# Patient Record
Sex: FEMALE | Race: WHITE | NOT HISPANIC OR LATINO | Employment: OTHER | URBAN - METROPOLITAN AREA
[De-identification: names, ages, dates, MRNs, and addresses within clinical notes are randomized per-mention and may not be internally consistent; named-entity substitution may affect disease eponyms.]

---

## 2023-03-01 ENCOUNTER — MEDICAL CORRESPONDENCE (OUTPATIENT)
Dept: HEALTH INFORMATION MANAGEMENT | Facility: CLINIC | Age: 78
End: 2023-03-01

## 2023-07-19 RX ORDER — ATENOLOL 50 MG/1
0.5 TABLET ORAL EVERY EVENING
COMMUNITY
Start: 2023-07-14

## 2023-07-19 RX ORDER — IBUPROFEN 200 MG
1 CAPSULE ORAL 3 TIMES DAILY
COMMUNITY

## 2023-07-19 RX ORDER — ATORVASTATIN CALCIUM 20 MG/1
0.5 TABLET, FILM COATED ORAL DAILY
COMMUNITY
Start: 2023-07-11

## 2023-07-19 RX ORDER — AMITRIPTYLINE HYDROCHLORIDE 50 MG/1
0.5 TABLET ORAL AT BEDTIME
COMMUNITY
Start: 2023-07-14

## 2023-07-19 RX ORDER — LEVOTHYROXINE SODIUM 125 UG/1
1 TABLET ORAL
COMMUNITY

## 2023-07-20 ENCOUNTER — ANESTHESIA EVENT (OUTPATIENT)
Dept: SURGERY | Facility: CLINIC | Age: 78
DRG: 470 | End: 2023-07-20
Payer: COMMERCIAL

## 2023-07-20 RX ORDER — ACETAMINOPHEN 500 MG
1-2 TABLET ORAL EVERY 6 HOURS PRN
Status: ON HOLD | COMMUNITY
End: 2023-07-24

## 2023-07-20 NOTE — PROGRESS NOTES
PTA medications updated by Medication Scribe prior to surgery via phone call with patient (last doses completed by Nurse)     Medication history sources: Patient and H&P  In the past week, patient estimated taking medication this percent of the time: Greater than 90%      Significant changes made to the medication list:  None      Additional medication history information:   None    Medication reconciliation completed by provider prior to medication history? No    Time spent in this activity: 30 minutes    The information provided in this note is only as accurate as the sources available at the time of update(s)    Prior to Admission medications    Medication Sig Last Dose Taking? Auth Provider Long Term End Date   acetaminophen (TYLENOL) 500 MG tablet Take 1-2 tablets by mouth every 6 hours as needed for mild pain 7/19/2023 at prn Yes Reported, Patient     amitriptyline (ELAVIL) 50 MG tablet Take 0.5 tablets by mouth At Bedtime (0.5 x 50 mg = 25 mg) 7/20/2023 at pm Yes Reported, Patient Yes    atenolol (TENORMIN) 50 MG tablet Take 0.5 tablets by mouth every evening (0.5 x 50 mg = 25 mg) 7/20/2023 at pm Yes Reported, Patient Yes    atorvastatin (LIPITOR) 20 MG tablet Take 0.5 tablets by mouth daily (0.5 x 20 mg = 10 mg) 7/20/2023 at pm Yes Reported, Patient Yes    calcium citrate (CITRACAL) 950 (200 Ca) MG tablet Take 1 tablet by mouth 3 times daily 7/14/2023 at pm Yes Reported, Patient     cholecalciferol (VITAMIN D3) 10 mcg (400 units) TABS tablet Take 1 tablet by mouth daily 7/14/2023 at pm Yes Reported, Patient     levothyroxine (SYNTHROID/LEVOTHROID) 125 MCG tablet Take 1 tablet by mouth every morning (before breakfast)  at am Yes Reported, Patient Yes    omeprazole (PRILOSEC) 20 MG DR capsule Take 1 capsule by mouth daily 7/20/2023 at pm Yes Reported, Patient       Medication history completed by:    Leonel Gonzalez CPhT  Medication Scribe  [facility]

## 2023-07-21 ENCOUNTER — HOSPITAL ENCOUNTER (INPATIENT)
Facility: CLINIC | Age: 78
LOS: 3 days | Discharge: NURSING, REHAB OR LONG-TERM CARE | DRG: 470 | End: 2023-07-25
Attending: ORTHOPAEDIC SURGERY | Admitting: ORTHOPAEDIC SURGERY
Payer: COMMERCIAL

## 2023-07-21 ENCOUNTER — APPOINTMENT (OUTPATIENT)
Dept: PHYSICAL THERAPY | Facility: CLINIC | Age: 78
DRG: 470 | End: 2023-07-21
Attending: ORTHOPAEDIC SURGERY
Payer: COMMERCIAL

## 2023-07-21 ENCOUNTER — APPOINTMENT (OUTPATIENT)
Dept: GENERAL RADIOLOGY | Facility: CLINIC | Age: 78
DRG: 470 | End: 2023-07-21
Attending: ORTHOPAEDIC SURGERY
Payer: COMMERCIAL

## 2023-07-21 ENCOUNTER — ANESTHESIA (OUTPATIENT)
Dept: SURGERY | Facility: CLINIC | Age: 78
DRG: 470 | End: 2023-07-21
Payer: COMMERCIAL

## 2023-07-21 DIAGNOSIS — Z96.652 STATUS POST TOTAL LEFT KNEE REPLACEMENT: Primary | ICD-10-CM

## 2023-07-21 LAB
CREAT SERPL-MCNC: 0.67 MG/DL (ref 0.51–0.95)
GFR SERPL CREATININE-BSD FRML MDRD: 88 ML/MIN/1.73M2

## 2023-07-21 PROCEDURE — 999N000141 HC STATISTIC PRE-PROCEDURE NURSING ASSESSMENT: Performed by: ORTHOPAEDIC SURGERY

## 2023-07-21 PROCEDURE — 250N000011 HC RX IP 250 OP 636: Performed by: NURSE ANESTHETIST, CERTIFIED REGISTERED

## 2023-07-21 PROCEDURE — 97162 PT EVAL MOD COMPLEX 30 MIN: CPT | Mod: GP

## 2023-07-21 PROCEDURE — 97116 GAIT TRAINING THERAPY: CPT | Mod: GP

## 2023-07-21 PROCEDURE — 258N000003 HC RX IP 258 OP 636: Performed by: NURSE ANESTHETIST, CERTIFIED REGISTERED

## 2023-07-21 PROCEDURE — 360N000077 HC SURGERY LEVEL 4, PER MIN: Performed by: ORTHOPAEDIC SURGERY

## 2023-07-21 PROCEDURE — 258N000003 HC RX IP 258 OP 636: Performed by: ANESTHESIOLOGY

## 2023-07-21 PROCEDURE — C1713 ANCHOR/SCREW BN/BN,TIS/BN: HCPCS | Performed by: ORTHOPAEDIC SURGERY

## 2023-07-21 PROCEDURE — 999N000063 XR KNEE PORT LEFT 1/2 VIEWS: Mod: LT

## 2023-07-21 PROCEDURE — 250N000009 HC RX 250: Performed by: STUDENT IN AN ORGANIZED HEALTH CARE EDUCATION/TRAINING PROGRAM

## 2023-07-21 PROCEDURE — 250N000011 HC RX IP 250 OP 636: Performed by: STUDENT IN AN ORGANIZED HEALTH CARE EDUCATION/TRAINING PROGRAM

## 2023-07-21 PROCEDURE — 97530 THERAPEUTIC ACTIVITIES: CPT | Mod: GP

## 2023-07-21 PROCEDURE — 272N000001 HC OR GENERAL SUPPLY STERILE: Performed by: ORTHOPAEDIC SURGERY

## 2023-07-21 PROCEDURE — 250N000011 HC RX IP 250 OP 636: Performed by: ORTHOPAEDIC SURGERY

## 2023-07-21 PROCEDURE — 250N000013 HC RX MED GY IP 250 OP 250 PS 637: Performed by: ORTHOPAEDIC SURGERY

## 2023-07-21 PROCEDURE — 36415 COLL VENOUS BLD VENIPUNCTURE: CPT | Performed by: STUDENT IN AN ORGANIZED HEALTH CARE EDUCATION/TRAINING PROGRAM

## 2023-07-21 PROCEDURE — 999N000127 HC STATISTIC PERIPHERAL IV START W US GUIDANCE

## 2023-07-21 PROCEDURE — 82565 ASSAY OF CREATININE: CPT | Performed by: STUDENT IN AN ORGANIZED HEALTH CARE EDUCATION/TRAINING PROGRAM

## 2023-07-21 PROCEDURE — 250N000013 HC RX MED GY IP 250 OP 250 PS 637: Performed by: STUDENT IN AN ORGANIZED HEALTH CARE EDUCATION/TRAINING PROGRAM

## 2023-07-21 PROCEDURE — 710N000009 HC RECOVERY PHASE 1, LEVEL 1, PER MIN: Performed by: ORTHOPAEDIC SURGERY

## 2023-07-21 PROCEDURE — 250N000013 HC RX MED GY IP 250 OP 250 PS 637: Performed by: PHYSICIAN ASSISTANT

## 2023-07-21 PROCEDURE — 258N000003 HC RX IP 258 OP 636: Performed by: ORTHOPAEDIC SURGERY

## 2023-07-21 PROCEDURE — 258N000003 HC RX IP 258 OP 636: Performed by: STUDENT IN AN ORGANIZED HEALTH CARE EDUCATION/TRAINING PROGRAM

## 2023-07-21 PROCEDURE — 370N000017 HC ANESTHESIA TECHNICAL FEE, PER MIN: Performed by: ORTHOPAEDIC SURGERY

## 2023-07-21 PROCEDURE — C1776 JOINT DEVICE (IMPLANTABLE): HCPCS | Performed by: ORTHOPAEDIC SURGERY

## 2023-07-21 PROCEDURE — 250N000011 HC RX IP 250 OP 636: Performed by: ANESTHESIOLOGY

## 2023-07-21 PROCEDURE — 0SRD0J9 REPLACEMENT OF LEFT KNEE JOINT WITH SYNTHETIC SUBSTITUTE, CEMENTED, OPEN APPROACH: ICD-10-PCS | Performed by: ORTHOPAEDIC SURGERY

## 2023-07-21 PROCEDURE — 250N000009 HC RX 250: Performed by: ANESTHESIOLOGY

## 2023-07-21 PROCEDURE — 99207 PR NO BILLABLE SERVICE THIS VISIT: CPT | Performed by: PHYSICIAN ASSISTANT

## 2023-07-21 DEVICE — ATTUNE PATELLA MEDIALIZED DOME 35MM CEMENTED AOX
Type: IMPLANTABLE DEVICE | Site: KNEE | Status: FUNCTIONAL
Brand: ATTUNE

## 2023-07-21 DEVICE — BONE CEMENT RADIOPAQUE SIMPLEX HV FULL DOSE 6194-1-001: Type: IMPLANTABLE DEVICE | Site: KNEE | Status: FUNCTIONAL

## 2023-07-21 DEVICE — ATTUNE KNEE SYSTEM TIBIAL INSERT FIXED BEARING MEDIAL STABILIZED LEFT AOX 5, 8MM
Type: IMPLANTABLE DEVICE | Site: KNEE | Status: FUNCTIONAL
Brand: ATTUNE

## 2023-07-21 DEVICE — ATTUNE KNEE SYSTEM FEMORAL CRUCIATE RETAINING NARROW SIZE 5N LEFT CEMENTED
Type: IMPLANTABLE DEVICE | Site: KNEE | Status: FUNCTIONAL
Brand: ATTUNE

## 2023-07-21 DEVICE — ATTUNE KNEE SYSTEM TIBIAL BASE FIXED BEARING SIZE 3 CEMENTED
Type: IMPLANTABLE DEVICE | Site: KNEE | Status: FUNCTIONAL
Brand: ATTUNE

## 2023-07-21 RX ORDER — PROPOFOL 10 MG/ML
INJECTION, EMULSION INTRAVENOUS PRN
Status: DISCONTINUED | OUTPATIENT
Start: 2023-07-21 | End: 2023-07-21

## 2023-07-21 RX ORDER — ONDANSETRON 2 MG/ML
4 INJECTION INTRAMUSCULAR; INTRAVENOUS EVERY 30 MIN PRN
Status: DISCONTINUED | OUTPATIENT
Start: 2023-07-21 | End: 2023-07-21 | Stop reason: HOSPADM

## 2023-07-21 RX ORDER — DIPHENHYDRAMINE HCL 12.5MG/5ML
12.5 LIQUID (ML) ORAL EVERY 6 HOURS PRN
Status: DISCONTINUED | OUTPATIENT
Start: 2023-07-21 | End: 2023-07-25 | Stop reason: HOSPADM

## 2023-07-21 RX ORDER — CELECOXIB 200 MG/1
200 CAPSULE ORAL DAILY
Qty: 42 CAPSULE | Refills: 0 | Status: SHIPPED | OUTPATIENT
Start: 2023-07-21 | End: 2023-07-24

## 2023-07-21 RX ORDER — HYDROMORPHONE HCL IN WATER/PF 6 MG/30 ML
0.4 PATIENT CONTROLLED ANALGESIA SYRINGE INTRAVENOUS EVERY 5 MIN PRN
Status: DISCONTINUED | OUTPATIENT
Start: 2023-07-21 | End: 2023-07-21 | Stop reason: HOSPADM

## 2023-07-21 RX ORDER — HYDROXYZINE HYDROCHLORIDE 10 MG/1
10 TABLET, FILM COATED ORAL EVERY 6 HOURS PRN
Status: DISCONTINUED | OUTPATIENT
Start: 2023-07-21 | End: 2023-07-25 | Stop reason: HOSPADM

## 2023-07-21 RX ORDER — OXYCODONE HYDROCHLORIDE 5 MG/1
5 TABLET ORAL EVERY 4 HOURS PRN
Status: DISCONTINUED | OUTPATIENT
Start: 2023-07-21 | End: 2023-07-23

## 2023-07-21 RX ORDER — SODIUM CHLORIDE, SODIUM LACTATE, POTASSIUM CHLORIDE, CALCIUM CHLORIDE 600; 310; 30; 20 MG/100ML; MG/100ML; MG/100ML; MG/100ML
INJECTION, SOLUTION INTRAVENOUS CONTINUOUS
Status: DISCONTINUED | OUTPATIENT
Start: 2023-07-21 | End: 2023-07-21 | Stop reason: HOSPADM

## 2023-07-21 RX ORDER — AMOXICILLIN 250 MG
1-2 CAPSULE ORAL 2 TIMES DAILY
Qty: 30 TABLET | Refills: 0 | Status: SHIPPED | OUTPATIENT
Start: 2023-07-21

## 2023-07-21 RX ORDER — PREGABALIN 150 MG/1
150 CAPSULE ORAL ONCE
Status: COMPLETED | OUTPATIENT
Start: 2023-07-21 | End: 2023-07-21

## 2023-07-21 RX ORDER — NALOXONE HYDROCHLORIDE 0.4 MG/ML
0.2 INJECTION, SOLUTION INTRAMUSCULAR; INTRAVENOUS; SUBCUTANEOUS
Status: DISCONTINUED | OUTPATIENT
Start: 2023-07-21 | End: 2023-07-25 | Stop reason: HOSPADM

## 2023-07-21 RX ORDER — BUPIVACAINE HYDROCHLORIDE 7.5 MG/ML
INJECTION, SOLUTION INTRASPINAL
Status: COMPLETED | OUTPATIENT
Start: 2023-07-21 | End: 2023-07-21

## 2023-07-21 RX ORDER — ONDANSETRON 4 MG/1
4 TABLET, ORALLY DISINTEGRATING ORAL EVERY 30 MIN PRN
Status: DISCONTINUED | OUTPATIENT
Start: 2023-07-21 | End: 2023-07-21 | Stop reason: HOSPADM

## 2023-07-21 RX ORDER — HYDROMORPHONE HCL IN WATER/PF 6 MG/30 ML
0.2 PATIENT CONTROLLED ANALGESIA SYRINGE INTRAVENOUS
Status: DISCONTINUED | OUTPATIENT
Start: 2023-07-21 | End: 2023-07-25 | Stop reason: HOSPADM

## 2023-07-21 RX ORDER — ATORVASTATIN CALCIUM 10 MG/1
10 TABLET, FILM COATED ORAL EVERY EVENING
Status: DISCONTINUED | OUTPATIENT
Start: 2023-07-21 | End: 2023-07-25 | Stop reason: HOSPADM

## 2023-07-21 RX ORDER — ACETAMINOPHEN 325 MG/1
975 TABLET ORAL ONCE
Status: COMPLETED | OUTPATIENT
Start: 2023-07-21 | End: 2023-07-21

## 2023-07-21 RX ORDER — METHOCARBAMOL 500 MG/1
500 TABLET, FILM COATED ORAL EVERY 6 HOURS PRN
Status: DISCONTINUED | OUTPATIENT
Start: 2023-07-21 | End: 2023-07-25 | Stop reason: HOSPADM

## 2023-07-21 RX ORDER — ACETAMINOPHEN 325 MG/1
975 TABLET ORAL EVERY 6 HOURS PRN
Qty: 100 TABLET | Refills: 0 | Status: SHIPPED | OUTPATIENT
Start: 2023-07-21

## 2023-07-21 RX ORDER — PROPOFOL 10 MG/ML
INJECTION, EMULSION INTRAVENOUS CONTINUOUS PRN
Status: DISCONTINUED | OUTPATIENT
Start: 2023-07-21 | End: 2023-07-21

## 2023-07-21 RX ORDER — NALOXONE HYDROCHLORIDE 0.4 MG/ML
0.4 INJECTION, SOLUTION INTRAMUSCULAR; INTRAVENOUS; SUBCUTANEOUS
Status: DISCONTINUED | OUTPATIENT
Start: 2023-07-21 | End: 2023-07-25 | Stop reason: HOSPADM

## 2023-07-21 RX ORDER — SODIUM CHLORIDE, SODIUM LACTATE, POTASSIUM CHLORIDE, CALCIUM CHLORIDE 600; 310; 30; 20 MG/100ML; MG/100ML; MG/100ML; MG/100ML
INJECTION, SOLUTION INTRAVENOUS CONTINUOUS PRN
Status: DISCONTINUED | OUTPATIENT
Start: 2023-07-21 | End: 2023-07-21

## 2023-07-21 RX ORDER — KETOROLAC TROMETHAMINE 15 MG/ML
15 INJECTION, SOLUTION INTRAMUSCULAR; INTRAVENOUS EVERY 6 HOURS
Status: COMPLETED | OUTPATIENT
Start: 2023-07-21 | End: 2023-07-22

## 2023-07-21 RX ORDER — VANCOMYCIN HYDROCHLORIDE 1 G/20ML
INJECTION, POWDER, LYOPHILIZED, FOR SOLUTION INTRAVENOUS PRN
Status: DISCONTINUED | OUTPATIENT
Start: 2023-07-21 | End: 2023-07-21 | Stop reason: HOSPADM

## 2023-07-21 RX ORDER — ACETAMINOPHEN 325 MG/1
975 TABLET ORAL EVERY 8 HOURS
Status: COMPLETED | OUTPATIENT
Start: 2023-07-21 | End: 2023-07-24

## 2023-07-21 RX ORDER — CELECOXIB 200 MG/1
400 CAPSULE ORAL ONCE
Status: COMPLETED | OUTPATIENT
Start: 2023-07-21 | End: 2023-07-21

## 2023-07-21 RX ORDER — BISACODYL 10 MG
10 SUPPOSITORY, RECTAL RECTAL DAILY PRN
Status: DISCONTINUED | OUTPATIENT
Start: 2023-07-21 | End: 2023-07-25 | Stop reason: HOSPADM

## 2023-07-21 RX ORDER — CEFAZOLIN SODIUM/WATER 2 G/20 ML
2 SYRINGE (ML) INTRAVENOUS
Status: COMPLETED | OUTPATIENT
Start: 2023-07-21 | End: 2023-07-21

## 2023-07-21 RX ORDER — CEFAZOLIN SODIUM/WATER 2 G/20 ML
2 SYRINGE (ML) INTRAVENOUS SEE ADMIN INSTRUCTIONS
Status: DISCONTINUED | OUTPATIENT
Start: 2023-07-21 | End: 2023-07-21 | Stop reason: HOSPADM

## 2023-07-21 RX ORDER — LIDOCAINE 40 MG/G
CREAM TOPICAL
Status: DISCONTINUED | OUTPATIENT
Start: 2023-07-21 | End: 2023-07-25 | Stop reason: HOSPADM

## 2023-07-21 RX ORDER — IBUPROFEN 200 MG
950 CAPSULE ORAL 3 TIMES DAILY
Status: DISCONTINUED | OUTPATIENT
Start: 2023-07-21 | End: 2023-07-25 | Stop reason: HOSPADM

## 2023-07-21 RX ORDER — KETOROLAC TROMETHAMINE 10 MG/1
10 TABLET, FILM COATED ORAL EVERY 6 HOURS
Qty: 6 TABLET | Refills: 0 | Status: SHIPPED | OUTPATIENT
Start: 2023-07-21 | End: 2023-07-24

## 2023-07-21 RX ORDER — ONDANSETRON 4 MG/1
4 TABLET, ORALLY DISINTEGRATING ORAL EVERY 6 HOURS PRN
Status: DISCONTINUED | OUTPATIENT
Start: 2023-07-21 | End: 2023-07-25 | Stop reason: HOSPADM

## 2023-07-21 RX ORDER — HYDROMORPHONE HCL IN WATER/PF 6 MG/30 ML
0.2 PATIENT CONTROLLED ANALGESIA SYRINGE INTRAVENOUS EVERY 5 MIN PRN
Status: DISCONTINUED | OUTPATIENT
Start: 2023-07-21 | End: 2023-07-21 | Stop reason: HOSPADM

## 2023-07-21 RX ORDER — CALCIUM CARBONATE 500 MG/1
500 TABLET, CHEWABLE ORAL 4 TIMES DAILY PRN
Status: DISCONTINUED | OUTPATIENT
Start: 2023-07-21 | End: 2023-07-25 | Stop reason: HOSPADM

## 2023-07-21 RX ORDER — CEFAZOLIN SODIUM 2 G/100ML
2 INJECTION, SOLUTION INTRAVENOUS EVERY 8 HOURS
Status: COMPLETED | OUTPATIENT
Start: 2023-07-21 | End: 2023-07-21

## 2023-07-21 RX ORDER — ASPIRIN 325 MG
325 TABLET, DELAYED RELEASE (ENTERIC COATED) ORAL DAILY
Qty: 42 TABLET | Refills: 0 | Status: SHIPPED | OUTPATIENT
Start: 2023-07-21 | End: 2023-09-01

## 2023-07-21 RX ORDER — ONDANSETRON 2 MG/ML
4 INJECTION INTRAMUSCULAR; INTRAVENOUS EVERY 6 HOURS PRN
Status: DISCONTINUED | OUTPATIENT
Start: 2023-07-21 | End: 2023-07-25 | Stop reason: HOSPADM

## 2023-07-21 RX ORDER — HYDROMORPHONE HCL IN WATER/PF 6 MG/30 ML
0.4 PATIENT CONTROLLED ANALGESIA SYRINGE INTRAVENOUS
Status: DISCONTINUED | OUTPATIENT
Start: 2023-07-21 | End: 2023-07-25 | Stop reason: HOSPADM

## 2023-07-21 RX ORDER — FENTANYL CITRATE 0.05 MG/ML
50 INJECTION, SOLUTION INTRAMUSCULAR; INTRAVENOUS EVERY 5 MIN PRN
Status: DISCONTINUED | OUTPATIENT
Start: 2023-07-21 | End: 2023-07-21 | Stop reason: HOSPADM

## 2023-07-21 RX ORDER — PROCHLORPERAZINE MALEATE 5 MG
5 TABLET ORAL EVERY 6 HOURS PRN
Status: DISCONTINUED | OUTPATIENT
Start: 2023-07-21 | End: 2023-07-25 | Stop reason: HOSPADM

## 2023-07-21 RX ORDER — ATENOLOL 25 MG/1
25 TABLET ORAL EVERY EVENING
Status: DISCONTINUED | OUTPATIENT
Start: 2023-07-21 | End: 2023-07-25 | Stop reason: HOSPADM

## 2023-07-21 RX ORDER — FENTANYL CITRATE 0.05 MG/ML
25 INJECTION, SOLUTION INTRAMUSCULAR; INTRAVENOUS EVERY 5 MIN PRN
Status: DISCONTINUED | OUTPATIENT
Start: 2023-07-21 | End: 2023-07-21 | Stop reason: HOSPADM

## 2023-07-21 RX ORDER — AMOXICILLIN 250 MG
1 CAPSULE ORAL 2 TIMES DAILY
Status: DISCONTINUED | OUTPATIENT
Start: 2023-07-21 | End: 2023-07-25 | Stop reason: HOSPADM

## 2023-07-21 RX ORDER — OXYCODONE HYDROCHLORIDE 5 MG/1
10 TABLET ORAL EVERY 4 HOURS PRN
Status: DISCONTINUED | OUTPATIENT
Start: 2023-07-21 | End: 2023-07-23

## 2023-07-21 RX ORDER — ACETAMINOPHEN 325 MG/1
650 TABLET ORAL EVERY 4 HOURS PRN
Status: DISCONTINUED | OUTPATIENT
Start: 2023-07-24 | End: 2023-07-25 | Stop reason: HOSPADM

## 2023-07-21 RX ORDER — ASPIRIN 325 MG
325 TABLET, DELAYED RELEASE (ENTERIC COATED) ORAL DAILY
Status: DISCONTINUED | OUTPATIENT
Start: 2023-07-22 | End: 2023-07-25 | Stop reason: HOSPADM

## 2023-07-21 RX ORDER — SODIUM CHLORIDE, SODIUM LACTATE, POTASSIUM CHLORIDE, CALCIUM CHLORIDE 600; 310; 30; 20 MG/100ML; MG/100ML; MG/100ML; MG/100ML
INJECTION, SOLUTION INTRAVENOUS CONTINUOUS
Status: DISCONTINUED | OUTPATIENT
Start: 2023-07-21 | End: 2023-07-23 | Stop reason: ALTCHOICE

## 2023-07-21 RX ORDER — TRANEXAMIC ACID 650 MG/1
1950 TABLET ORAL ONCE
Status: COMPLETED | OUTPATIENT
Start: 2023-07-21 | End: 2023-07-21

## 2023-07-21 RX ORDER — POLYETHYLENE GLYCOL 3350 17 G/17G
17 POWDER, FOR SOLUTION ORAL DAILY
Status: DISCONTINUED | OUTPATIENT
Start: 2023-07-22 | End: 2023-07-25 | Stop reason: HOSPADM

## 2023-07-21 RX ADMIN — SENNOSIDES AND DOCUSATE SODIUM 1 TABLET: 8.6; 5 TABLET ORAL at 20:48

## 2023-07-21 RX ADMIN — BUPIVACAINE HYDROCHLORIDE 15 ML: 5 INJECTION, SOLUTION EPIDURAL; INTRACAUDAL at 06:58

## 2023-07-21 RX ADMIN — CHOLECALCIFEROL TAB 10 MCG (400 UNIT) 10 MCG: 10 TAB at 20:48

## 2023-07-21 RX ADMIN — ACETAMINOPHEN 975 MG: 325 TABLET, FILM COATED ORAL at 22:31

## 2023-07-21 RX ADMIN — PROPOFOL 125 MCG/KG/MIN: 10 INJECTION, EMULSION INTRAVENOUS at 07:48

## 2023-07-21 RX ADMIN — SODIUM CHLORIDE, POTASSIUM CHLORIDE, SODIUM LACTATE AND CALCIUM CHLORIDE: 600; 310; 30; 20 INJECTION, SOLUTION INTRAVENOUS at 11:35

## 2023-07-21 RX ADMIN — CEFAZOLIN SODIUM 2 G: 2 INJECTION, SOLUTION INTRAVENOUS at 14:20

## 2023-07-21 RX ADMIN — PROPOFOL 30 MG: 10 INJECTION, EMULSION INTRAVENOUS at 07:49

## 2023-07-21 RX ADMIN — KETOROLAC TROMETHAMINE 15 MG: 15 INJECTION, SOLUTION INTRAMUSCULAR; INTRAVENOUS at 18:51

## 2023-07-21 RX ADMIN — CEFAZOLIN SODIUM 2 G: 2 INJECTION, SOLUTION INTRAVENOUS at 22:32

## 2023-07-21 RX ADMIN — TRANEXAMIC ACID 1950 MG: 650 TABLET ORAL at 06:29

## 2023-07-21 RX ADMIN — SODIUM CHLORIDE, POTASSIUM CHLORIDE, SODIUM LACTATE AND CALCIUM CHLORIDE: 600; 310; 30; 20 INJECTION, SOLUTION INTRAVENOUS at 10:02

## 2023-07-21 RX ADMIN — Medication 950 MG: at 22:31

## 2023-07-21 RX ADMIN — Medication 2 G: at 07:34

## 2023-07-21 RX ADMIN — SODIUM CHLORIDE, POTASSIUM CHLORIDE, SODIUM LACTATE AND CALCIUM CHLORIDE: 600; 310; 30; 20 INJECTION, SOLUTION INTRAVENOUS at 07:22

## 2023-07-21 RX ADMIN — SENNOSIDES AND DOCUSATE SODIUM 1 TABLET: 8.6; 5 TABLET ORAL at 12:21

## 2023-07-21 RX ADMIN — AMITRIPTYLINE HYDROCHLORIDE 25 MG: 25 TABLET, FILM COATED ORAL at 22:31

## 2023-07-21 RX ADMIN — ACETAMINOPHEN 975 MG: 325 TABLET, FILM COATED ORAL at 13:29

## 2023-07-21 RX ADMIN — MIDAZOLAM 2 MG: 1 INJECTION INTRAMUSCULAR; INTRAVENOUS at 06:51

## 2023-07-21 RX ADMIN — CELECOXIB 400 MG: 200 CAPSULE ORAL at 06:31

## 2023-07-21 RX ADMIN — ATORVASTATIN CALCIUM 10 MG: 10 TABLET, FILM COATED ORAL at 20:48

## 2023-07-21 RX ADMIN — ACETAMINOPHEN 975 MG: 325 TABLET, FILM COATED ORAL at 06:29

## 2023-07-21 RX ADMIN — BUPIVACAINE HYDROCHLORIDE IN DEXTROSE 1.6 ML: 7.5 INJECTION, SOLUTION SUBARACHNOID at 07:48

## 2023-07-21 RX ADMIN — Medication 950 MG: at 15:04

## 2023-07-21 RX ADMIN — PROPOFOL 30 MG: 10 INJECTION, EMULSION INTRAVENOUS at 07:53

## 2023-07-21 RX ADMIN — PHENYLEPHRINE HYDROCHLORIDE 0.5 MCG/KG/MIN: 10 INJECTION INTRAVENOUS at 07:48

## 2023-07-21 RX ADMIN — PREGABALIN 150 MG: 150 CAPSULE ORAL at 06:30

## 2023-07-21 RX ADMIN — SODIUM CHLORIDE, POTASSIUM CHLORIDE, SODIUM LACTATE AND CALCIUM CHLORIDE: 600; 310; 30; 20 INJECTION, SOLUTION INTRAVENOUS at 06:31

## 2023-07-21 ASSESSMENT — ACTIVITIES OF DAILY LIVING (ADL)
ADLS_ACUITY_SCORE: 18
ADLS_ACUITY_SCORE: 22
ADLS_ACUITY_SCORE: 18
ADLS_ACUITY_SCORE: 22
ADLS_ACUITY_SCORE: 35
ADLS_ACUITY_SCORE: 18
ADLS_ACUITY_SCORE: 22

## 2023-07-21 ASSESSMENT — ENCOUNTER SYMPTOMS
DYSRHYTHMIAS: 0
ORTHOPNEA: 0
SEIZURES: 0

## 2023-07-21 NOTE — PROGRESS NOTES
07/21/23 1533   Appointment Info   Signing Clinician's Name / Credentials (PT) Bebeto Alford DPT   Quick Adds   Quick Adds Certification   Living Environment   People in Home spouse   Current Living Arrangements house   Home Accessibility stairs to enter home;stairs within home   Number of Stairs, Main Entrance 1   Stair Railings, Main Entrance railing on left side (ascending)   Number of Stairs, Within Home, Primary greater than 10 stairs   Stair Railings, Within Home, Primary railing on left side (ascending)   Transportation Anticipated car, drives self   Living Environment Comments Pt reports that she lives w/ spouse in a house. Reports one step to enter w/ L sided railing. Reports a flight of stairs to her bedroom w/ L sided railing.   Self-Care   Usual Activity Tolerance good   Current Activity Tolerance moderate   Equipment Currently Used at Home walker, rolling   Fall history within last six months no   Activity/Exercise/Self-Care Comment Reports typically independent at baseline w/ all mobility and ADLs w/o AD.   General Information   Onset of Illness/Injury or Date of Surgery 07/21/23   Referring Physician Coy Fowler MD   Patient/Family Therapy Goals Statement (PT) Return to home   Pertinent History of Current Problem (include personal factors and/or comorbidities that impact the POC) POD #0 L TKA   Existing Precautions/Restrictions fall   Weight-Bearing Status - LLE weight-bearing as tolerated   Cognition   Affect/Mental Status (Cognition) WFL   Orientation Status (Cognition) oriented x 4   Follows Commands (Cognition) WFL   Pain Assessment   Patient Currently in Pain Yes, see Vital Sign flowsheet  (4/10 at rest)   Range of Motion (ROM)   ROM Comment 22-80 L knee AROM   Strength (Manual Muscle Testing)   Strength (Manual Muscle Testing) Able to perform L SLR   Bed Mobility   Comment, (Bed Mobility) Supine to sit w/ SBA slow sher   Transfers   Comment, (Transfers) SIt to stand w/ FWW and CGA    Gait/Stairs (Locomotion)   Comment, (Gait/Stairs) 10 ft w/ FWW and CGA   Balance   Balance Comments No overt LOB noted   Clinical Impression   Criteria for Skilled Therapeutic Intervention Yes, treatment indicated   PT Diagnosis (PT) Impaired ambulation   Influenced by the following impairments Impaired strength, balance and activity tolerance   Functional limitations due to impairments Impaired ADLs, IADLs and functional mobility   Clinical Presentation (PT Evaluation Complexity) Stable/Uncomplicated   Clinical Presentation Rationale Clinical judgment   Clinical Decision Making (Complexity) low complexity   Planned Therapy Interventions (PT) balance training;bed mobility training;gait training;home exercise program;patient/family education;transfer training;stair training;strengthening;ROM (range of motion);progressive activity/exercise   Risk & Benefits of therapy have been explained evaluation/treatment results reviewed;care plan/treatment goals reviewed;risks/benefits reviewed;current/potential barriers reviewed;participants voiced agreement with care plan;participants included;patient   PT Total Evaluation Time   PT Eval, Moderate Complexity Minutes (15686) 10   Therapy Certification   Start of care date 07/21/23   Certification date from 07/21/23   Certification date to 07/24/23   Medical Diagnosis L TKA   Physical Therapy Goals   PT Frequency 2x/day   PT Predicted Duration/Target Date for Goal Attainment 07/26/23   PT Goals Bed Mobility;Transfers;Gait;Stairs   PT: Bed Mobility Independent;Supine to/from sit   PT: Transfers Modified independent;Sit to/from stand;Assistive device   PT: Gait Modified independent;Rolling walker;150 feet   PT: Stairs Independent;Greater than 10 stairs;Rail on left   Interventions   Interventions Quick Adds Gait Training;Therapeutic Activity;Therapeutic Procedure   Therapeutic Procedure/Exercise   Ther. Procedure: strength, endurance, ROM, flexibillity Minutes (95323) 8    Symptoms Noted During/After Treatment increased pain   Treatment Detail/Skilled Intervention Pt completing supine exercises to improve upon functional strengthening. Completing x5 reps of L APs, quad sets, glute sets, SAQ, heel slides and SLR. Educated on sets and reps. Handout provided.   Therapeutic Activity   Therapeutic Activities: dynamic activities to improve functional performance Minutes (72718) 12   Symptoms Noted During/After Treatment Increased pain;Fatigue   Treatment Detail/Skilled Intervention Pt supine in bed at start of session. Agreeable to PT. Educated on WBAT status. Slow sher for supine to sit transfer requiring cues for sequencing. Sit to stand from bed height x1 during session w/ FWW and CGA. Requesting to use toilet during session. Stand <> sit from toilet height w/ FWW and CGA. Educated on kicking LLE out w/ transfers to decrease pain. Able to wash hands at sink w/ CGA. Transfering to bedside chair w/ FWW and CGA. Slow and painful sher noted.   Gait Training   Gait Training Minutes (69522) 10   Symptoms Noted During/After Treatment (Gait Training) increased pain;fatigue   Treatment Detail/Skilled Intervention Pt ambulating ~100 ft w/ FWW and CGA. Slow sher noted. Step to pattern initially and then reciprocal pattern. Reporting pain w/ ambulation. Downward head. Educated on sizing FWW for at home.   PT Discharge Planning   PT Plan Stairs, Ambulation distance, review HEP, transfers and bed mobility   PT Discharge Recommendation (DC Rec)   (Defer to ortho)   PT Rationale for DC Rec Pt below baseline mobility. Lives in a house with spouse. Has stairs to complete. Typically independent at baseline w/ mobility and ADLs w/o AD. Currently CGA w/ FWW. Anticipate with continued IP PT Pt will be able to progress to Mod I w/ FWW and be able to DC home w/  for assistance and begin OP PT as planned.   PT Brief overview of current status CGA w/ FWW   Total Session Time   Timed Code  Treatment Minutes 30   Total Session Time (sum of timed and untimed services) 40   [facility]  OUTPATIENT PHYSICAL THERAPY EVALUATION  PLAN OF TREATMENT FOR OUTPATIENT REHABILITATION  (COMPLETE FOR INITIAL CLAIMS ONLY)  Patient's Last Name, First Name, M.I.  YOB: 1945  Liliane Romo                        Provider's Name  [facility] Medical Record No.  [id number]                             Onset Date:  07/21/23   Start of Care Date:  07/21/23   Type:     _X_PT   ___OT   ___SLP Medical Diagnosis:  L TKA              PT Diagnosis:  Impaired ambulation Visits from SOC:  1     See note for plan of treatment, functional goals and certification details    I CERTIFY THE NEED FOR THESE SERVICES FURNISHED UNDER        THIS PLAN OF TREATMENT AND WHILE UNDER MY CARE     (Physician co-signature of this document indicates review and certification of the therapy plan).

## 2023-07-21 NOTE — ANESTHESIA PROCEDURE NOTES
"Intrathecal Procedure Note    Pre-Procedure   Staff -        Anesthesiologist:  Mario Carrillo MD       Performed By: anesthesiologist       Location: OR       Pre-Anesthestic Checklist: patient identified, IV checked, site marked, risks and benefits discussed, informed consent, monitors and equipment checked and pre-op evaluation  Timeout:       Correct Patient: Yes        Correct Procedure: Yes        Correct Site: Yes        Correct Position: Yes   Procedure Documentation  Procedure: intrathecal       Patient Position: sitting       Patient Prep/Sterile Barriers: sterile gloves, mask, patient draped       Skin prep: Betadine       Insertion Site: L4-5. (right paramedian approach).       Needle Gauge: 22.        Needle Length (Inches): 4        Spinal Needle Type: Quincke       Introducer used       Introducer: 20 G       # of attempts: 2 and  # of redirects:  3    Assessment/Narrative         Paresthesias: No.       CSF fluid: clear.    Medication(s) Administered   0.75% Hyperbaric Bupivacaine (Intrathecal) - Intrathecal   1.6 mL - 7/21/2023 7:48:00 AM   Comments:  Patient tolerated procedure well   No complications    First attempt via right paramedian approach at L3-L4, unable to identify subarachnoid space after multiple needle redirects Moved to L4 - L5 interspace.        FOR [facility] ([facility]:   Pain Team Contact information: please page the Pain Team Via [employer]. Search \"Pain\". During daytime hours, please page the attending first. At night please page the resident first.      "

## 2023-07-21 NOTE — BRIEF OP NOTE
[facility]    Brief Operative Note    Pre-operative diagnosis: Left knee OA  Post-operative diagnosis same  Procedure: LEFT TOTAL KNEE ARTHROPLASTY  Surgeon(s) and Role:     * Coy Fowler MD - Primary     * Annamaria Ac PA-C - Assisting     * BARBARA Blake, MIRIAM-C - Assisting  Anesthesia: Spinal   Estimated blood loss: 100 ml  Drains:  None  Specimens: None  Findings:  Advanced OA  Complications: None    Plan: DC home POD1 w/family assist.  DVT prophylaxis w/ASA 325mg QD x6wks.    Implants:   Implant Name Type Inv. Item Serial No.  Lot No. LRB No. Used Action   BONE CEMENT RADIOPAQUE SIMPLEX HV FULL DOSE 6194-1-001 - [id number] Cement, Bone BONE CEMENT RADIOPAQUE SIMPLEX HV FULL DOSE 6194-1-001  [employer] [id number] Left 2 Implanted   IMP PATELLA JJ ATTUNE DOME 35MM [id number] - [id number] Total Joint Component/Insert IMP PATELLA JJ ATTUNE DOME 35MM [id number]  [employer]- [id number] Left 1 Implanted   IMP TIB BASE JJ ATTUNE FX BR SYS TERESA SZ3 1506- - [id number] Total Joint Component/Insert IMP TIB BASE JJ ATTUNE FX BR SYS TERESA SZ3 1506-  [employer]- [id number] Left 1 Implanted   IMP COMP FEM JJ ATTUNE CR LT NRW TERESA SZ5 [id number] - [id number] Total Joint Component/Insert IMP COMP FEM JJ ATTUNE CR LT NRW TERESA SZ5 [id number]  [employer]- [id number] Left 1 Implanted   INSERT TIB 5 8MM KN LT CRCTE RTN MDL STAB ATTUNE [id number] - [id number] Total Joint Component/Insert INSERT TIB 5 8MM KN LT CRCTE RTN MDL STAB ATTUNE [id number]  [employer]- [id number] Left 1 Implanted

## 2023-07-21 NOTE — CONSULTS
[facility]  BRIEF HOSPITALIST CONSULT NOTE- Hospitalist Service     Date of Admission:  7/21/2023  Consult Requested by: Dr. Burton  Reason for Consult: Post-op med rec and medical management - resting tremor, hypoparathyroidism, hypothyroidism, esophageal stricture, diverticulosis, HLD, BPPV, HTN  PRIMARY CARE PROVIDER:    No Ref-Primary, Physician    Assessment & Plan   Liliane Romo is a 78 year old female admitted on 7/21/2023.    Past medical history significant for OA, Obesity, HTN, HLP, BPPV, Resting upper extremity tremor, Iatrogenic Hypoparathyroidism, Iatrogenic Hypothyroidism, Migraines, History of esophageal strictures, Known diverticulosis who underwent an elective left TKA.      EMR was reviewed that included pre-op H&P and PTA medications.  Vital signs reviewed and occasional bradycardia.  Formal consult will be deferred.  Please see outlined plan below.  Admission and Discharge PTA (Home) medication reconciliation has been completed.  Hospitalist will sign off.  Please call or reconsult if any questions or concerns arise.     OA with degenerative changes of the left knee s/p left TKA  POD #0.   - Orthopedic Surgery is managing.   --Analgesic/pain management, DVT prophylaxis, PT/OT consultation per Ortho.    - HGB check ordered for the morning.     - Encourage utilization of incentive spirometer.     Obesity  Body mass index is 37.25 kg/m .  Increase in all-cause morbidity and mortality.   - Follow up with PCP regarding ongoing management.    - Monitor O2 saturations.        HTN  - Resumed on PTA atenolol 25 mg at bedtime with hold parameters.      HLP  - Resumed on PTA atorvastatin 10 mg/d.      BPPV  Not currently on any medications.  No interventions.      Resting upper extremity tremor  - Resumed on PTA atenolol 25 mg at bedtime with hold parameters.      Iatrogenic Hypoparathyroidism  Iatrogenic Hypothyroidism  History of papillary thyroid cancer s/p thyroidectomy  -  "Resumed on PTA Citracal 950 mg TID and continue outpatient surveillance of calcium levels.    - Resumed on PTA Vit D3 supplement.    - Resumed on PTA levothyroxine 125 mcg/d.      Migraines  - Resumed on PTA amitriptyline 25 mg at bedtime.      History of esophageal strictures  Has undergone dilatation x2.  No interventions.      Known diverticulosis  No interventions.      Clinically Significant Risk Factors Present on Admission                    # Obesity: Estimated body mass index is 37.25 kg/m  as calculated from the following:    Height as of this encounter: 1.626 m (5' 4\").    Weight as of this encounter: 98.4 kg (217 lb).              Diet: Advance Diet as Tolerated: Regular Diet Adult  Discharge Instruction - Regular Diet Adult     DVT Prophylaxis: Defer to primary service   Hernández Catheter: Not present  Code Status: Full Code; per Ortho.     Disposition Plan    Per Ortho.       Juan Ríos PA-C  [facility]  Securely message with the Vocera Web Console (learn more here)  Text page via [employer] Paging/Directory        "

## 2023-07-21 NOTE — ANESTHESIA PREPROCEDURE EVALUATION
Anesthesia Pre-Procedure Evaluation    Patient: Liliane Romo   MRN: [id number] : 1945        Procedure : Procedure(s):  left total knee arthroplasty          Past Medical History:   Diagnosis Date     Arthritis      BPPV (benign paroxysmal positional vertigo)      Esophageal stricture     Dilation x 2     HLD (hyperlipidemia)      Hypertension      Migraine      Obese      Thyroid disease      Tremor       Past Surgical History:   Procedure Laterality Date     CERVICAL POLYPECTOMY       CL AFF SURGICAL PATHOLOGY Left     LEFT THYROID LOBECTOMY AND ISTHMECTOMYAND ABLATION     EYE SURGERY Bilateral     cataract     GYN SURGERY      tubal ligation     IR ESOPHAGEAL STENTING      x2     THYROIDECTOMY        wisdom teeth        No Known Allergies   Social History     Tobacco Use     Smoking status: Never     Smokeless tobacco: Never   Substance Use Topics     Alcohol use: Yes     Comment: 1 glass wine per week      Wt Readings from Last 1 Encounters:   No data found for Wt      HGB 13.6  EKG - SR, nonspecific ST abnormality  Anesthesia Evaluation   Pt has had prior anesthetic.         ROS/MED HX  ENT/Pulmonary:     (+) ELAYNE risk factors, hypertension, obese,  (-) sleep apnea and recent URI   Neurologic: Comment: Tremor right hand   Vertigo     (+) migraines,  (-) no seizures and no CVA   Cardiovascular:     (+) Dyslipidemia hypertension----- (-) CHF, orthopnea/PND and arrhythmias   METS/Exercise Tolerance:     Hematologic:  - neg hematologic  ROS     Musculoskeletal:   (+) arthritis,     GI/Hepatic: Comment: Esophageal stricture s/p dilation x 2    (+) GERD,     Renal/Genitourinary:  - neg Renal ROS     Endo: Comment: HRT  hypoparathyroidism     (+) thyroid problem, hypothyroidism, Obesity,  (-) Type II DM   Psychiatric/Substance Use:  - neg psychiatric ROS     Infectious Disease:       Malignancy:   (+) Malignancy, History of Other.Other CA thryoid cancer status post.    Other:            Physical  Exam    Airway        Mallampati: III   TM distance: > 3 FB   Neck ROM: full   Mouth opening: > 3 cm    Respiratory Devices and Support         Dental       (+) Minor Abnormalities - some fillings, tiny chips      Cardiovascular   cardiovascular exam normal       Rhythm and rate: regular and normal     Pulmonary   pulmonary exam normal        breath sounds clear to auscultation           OUTSIDE LABS:  CBC: No results found for: WBC, HGB, HCT, PLT  BMP: No results found for: NA, POTASSIUM, CHLORIDE, CO2, BUN, CR, GLC  COAGS: No results found for: PTT, INR, FIBR  POC: No results found for: BGM, HCG, HCGS  HEPATIC: No results found for: ALBUMIN, PROTTOTAL, ALT, AST, GGT, ALKPHOS, BILITOTAL, BILIDIRECT, MEL  OTHER: No results found for: PH, LACT, A1C, YULISSA, PHOS, MAG, LIPASE, AMYLASE, TSH, T4, T3, CRP, SED    Anesthesia Plan    ASA Status:  2   NPO Status:  NPO Appropriate    Anesthesia Type: Spinal.              Consents    Anesthesia Plan(s) and associated risks, benefits, and realistic alternatives discussed. Questions answered and patient/representative(s) expressed understanding.    - Discussed:     - Discussed with:  Patient         Postoperative Care    Pain management: Multi-modal analgesia, Peripheral nerve block (Single Shot).   PONV prophylaxis: Ondansetron (or other 5HT-3), Dexamethasone or Solumedrol     Comments:    Other Comments: The surgeon has given a verbal order transferring care of this patient to me for the performance of a regional analgesia block for post-op pain control. It is requested of me because I am uniquely trained and qualified to perform this block and the surgeon is neither trained nor qualified to perform this procedure.            Mario Carrillo MD

## 2023-07-21 NOTE — ANESTHESIA PROCEDURE NOTES
Adductor canal Procedure Note    Pre-Procedure   Staff -        Anesthesiologist:  Mario Carrillo MD       Performed By: Anesthesiologist       Location: pre-op       Pre-Anesthestic Checklist: patient identified, IV checked, site marked, risks and benefits discussed, informed consent, monitors and equipment checked, pre-op evaluation, at physician/surgeon's request and post-op pain management  Timeout:       Correct Patient: Yes        Correct Procedure: Yes        Correct Site: Yes        Correct Position: Yes        Correct Laterality: Yes        Site Marked: Yes  Procedure Documentation  Procedure: Adductor canal       Patient Position: supine       Skin prep: Chloraprep       Local skin infiltrated with 3 mL of 1% lidocaine.        Needle Type: insulated and short bevel       Needle Gauge: 21.        Needle Length (millimeters): 100        Ultrasound guided       1. Ultrasound was used to identify targeted nerve, plexus, vascular marker, or fascial plane and place a needle adjacent to it in real-time.       2. Ultrasound was used to visualize the spread of anesthetic in close proximity to the above referenced structure.       3. A permanent image is entered into the patient's record.       4. The visualized anatomic structures appeared normal.       5. There were no apparent abnormal pathologic findings.    Assessment/Narrative         The placement was negative for: blood aspirated, painful injection and site bleeding       Paresthesias: No.       Bolus given via needle. no blood aspirated via catheter.        Secured via.        Insertion/Infusion Method: Single Shot       Complications: none    Medication(s) Administered   Bupivacaine 0.5% w/ 1:400K Epi (Injection) - Injection   15 mL - 7/21/2023 6:58:00 AM   Comments:  Ultrasound Interpretation, Peripheral Nerve Block    1. Under ultrasound guidance, the needle was inserted and placed in close proximity to the target nerve(s).  2. Ultrasound was also  "used to visualize the spread of the anesthetic in close proximity to the nerve(s) being blocked.  Local anesthetic was administered in incremental doses, with intermittent negative aspiration.    3. The nerve(s) appeared anatomically normal.  4. There were no apparent abnormal pathological findings.  5. A permanent ultrasound image was saved in the patient's record.    Pt tolerated well.    No complications.      The surgeon has given a verbal order transferring care of this patient to me for the performance of a regional analgesia block for post-op pain control. It is requested of me because I am uniquely trained and qualified to perform this block and the surgeon is neither trained nor qualified to perform this procedure.      FOR [facility] ([facility]) ONLY:   Pain Team Contact information: please page the Pain Team Via [employer]. Search \"Pain\". During daytime hours, please page the attending first. At night please page the resident first.      "

## 2023-07-21 NOTE — PROGRESS NOTES
"Orientation/Cognitive: A&Ox4  Mobility Level/Assist Equipment: A1/GB/Walker  Fall Risk (Y/N): Yes  Behavior Concerns: GREEN  Pain Management: Tylenol, dilaudid, hydroxyzine  Tele/VS/O2: VSS on RA  ABNL Lab/BG: None  Diet: Clears  Bowel/Bladder: Continent and voiding approprately  Skin Concerns:  Left knee incisions site  Drains/Devices: 2 left PIV  Tests/Procedures for next shift: Pain management and ambulation promotion  Anticipated DC date & active delays: TBD      /63 (BP Location: Right arm)   Pulse 53   Temp 97.5  F (36.4  C) (Oral)   Resp 19   Ht 1.626 m (5' 4\")   Wt 98.4 kg (217 lb)   SpO2 99%   BMI 37.25 kg/m      "

## 2023-07-21 NOTE — ANESTHESIA CARE TRANSFER NOTE
Patient: Liliane Romo    Procedure: Procedure(s):  left total knee arthroplasty       Diagnosis: Left knee DJD [M17.12]  Diagnosis Additional Information: No value filed.    Anesthesia Type:   Spinal     Note:    Oropharynx: oropharynx clear of all foreign objects  Level of Consciousness: awake  Oxygen Supplementation: face mask  Level of Supplemental Oxygen (L/min / FiO2): 6  Independent Airway: airway patency satisfactory and stable  Dentition: dentition unchanged  Vital Signs Stable: post-procedure vital signs reviewed and stable  Report to RN Given: handoff report given  Patient transferred to: PACU    Handoff Report: Identifed the Patient, Identified the Reponsible Provider, Reviewed the pertinent medical history, Discussed the surgical course, Reviewed Intra-OP anesthesia mangement and issues during anesthesia, Set expectations for post-procedure period and Allowed opportunity for questions and acknowledgement of understanding      Vitals:  Vitals Value Taken Time   /62 07/21/23 1045   Temp 36.5  C (97.7  F) 07/21/23 1016   Pulse 62 07/21/23 1058   Resp 22 07/21/23 1058   SpO2 96 % 07/21/23 1058   Vitals shown include unvalidated device data.    Electronically Signed By: ROCKY Bass CRNA  July 21, 2023  11:00 AM

## 2023-07-21 NOTE — ANESTHESIA POSTPROCEDURE EVALUATION
Patient: Liliane Romo    Procedure: Procedure(s):  left total knee arthroplasty       Anesthesia Type:  Spinal    Note:     Postop Pain Control: Uneventful            Sign Out: Well controlled pain   PONV: No   Neuro/Psych: Uneventful            Sign Out: Acceptable/Baseline neuro status   Airway/Respiratory: Uneventful            Sign Out: Acceptable/Baseline resp. status   CV/Hemodynamics: Uneventful            Sign Out: Acceptable CV status; No obvious hypovolemia; No obvious fluid overload   Other NRE: NONE   DID A NON-ROUTINE EVENT OCCUR? No           Last vitals:  Vitals Value Taken Time   /66 07/21/23 1100   Temp 36.5  C (97.7  F) 07/21/23 1016   Pulse 60 07/21/23 1100   Resp 22 07/21/23 1100   SpO2 97 % 07/21/23 1059   Vitals shown include unvalidated device data.    Electronically Signed By: Mario Carrillo MD  July 21, 2023  11:43 AM

## 2023-07-22 ENCOUNTER — APPOINTMENT (OUTPATIENT)
Dept: OCCUPATIONAL THERAPY | Facility: CLINIC | Age: 78
DRG: 470 | End: 2023-07-22
Attending: ORTHOPAEDIC SURGERY
Payer: COMMERCIAL

## 2023-07-22 PROBLEM — Z96.652 STATUS POST TOTAL LEFT KNEE REPLACEMENT: Status: ACTIVE | Noted: 2023-07-22

## 2023-07-22 LAB
ALBUMIN UR-MCNC: 10 MG/DL
ANION GAP SERPL CALCULATED.3IONS-SCNC: 11 MMOL/L (ref 7–15)
APPEARANCE UR: CLEAR
BILIRUB UR QL STRIP: NEGATIVE
BUN SERPL-MCNC: 18.8 MG/DL (ref 8–23)
CA-I BLD-MCNC: 3.9 MG/DL (ref 4.4–5.2)
CALCIUM SERPL-MCNC: 8.1 MG/DL (ref 8.8–10.2)
CHLORIDE SERPL-SCNC: 106 MMOL/L (ref 98–107)
COLOR UR AUTO: YELLOW
CREAT SERPL-MCNC: 0.91 MG/DL (ref 0.51–0.95)
DEPRECATED HCO3 PLAS-SCNC: 23 MMOL/L (ref 22–29)
ERYTHROCYTE [DISTWIDTH] IN BLOOD BY AUTOMATED COUNT: 13.2 % (ref 10–15)
FASTING STATUS PATIENT QL REPORTED: YES
GFR SERPL CREATININE-BSD FRML MDRD: 64 ML/MIN/1.73M2
GLUCOSE BLDC GLUCOMTR-MCNC: 125 MG/DL (ref 70–99)
GLUCOSE SERPL-MCNC: 138 MG/DL (ref 70–99)
GLUCOSE SERPL-MCNC: 138 MG/DL (ref 70–99)
GLUCOSE UR STRIP-MCNC: NEGATIVE MG/DL
HCT VFR BLD AUTO: 34.5 % (ref 35–47)
HGB BLD-MCNC: 11.2 G/DL (ref 11.7–15.7)
HGB BLD-MCNC: 11.2 G/DL (ref 11.7–15.7)
HGB BLD-MCNC: 9.6 G/DL (ref 11.7–15.7)
HGB UR QL STRIP: NEGATIVE
HYALINE CASTS: 6 /LPF
KETONES UR STRIP-MCNC: NEGATIVE MG/DL
LEUKOCYTE ESTERASE UR QL STRIP: NEGATIVE
MCH RBC QN AUTO: 30 PG (ref 26.5–33)
MCHC RBC AUTO-ENTMCNC: 32.5 G/DL (ref 31.5–36.5)
MCV RBC AUTO: 93 FL (ref 78–100)
MUCOUS THREADS #/AREA URNS LPF: PRESENT /LPF
NITRATE UR QL: NEGATIVE
NT-PROBNP SERPL-MCNC: 336 PG/ML (ref 0–1800)
PH UR STRIP: 5.5 [PH] (ref 5–7)
PLATELET # BLD AUTO: 251 10E3/UL (ref 150–450)
POTASSIUM SERPL-SCNC: 4.2 MMOL/L (ref 3.4–5.3)
RBC # BLD AUTO: 3.73 10E6/UL (ref 3.8–5.2)
RBC URINE: <1 /HPF
SODIUM SERPL-SCNC: 140 MMOL/L (ref 136–145)
SP GR UR STRIP: 1.02 (ref 1–1.03)
SQUAMOUS EPITHELIAL: 6 /HPF
TROPONIN T SERPL HS-MCNC: 11 NG/L
TROPONIN T SERPL HS-MCNC: 12 NG/L
TSH SERPL DL<=0.005 MIU/L-ACNC: 0.43 UIU/ML (ref 0.3–4.2)
UROBILINOGEN UR STRIP-MCNC: NORMAL MG/DL
WBC # BLD AUTO: 10.8 10E3/UL (ref 4–11)
WBC URINE: 5 /HPF

## 2023-07-22 PROCEDURE — 120N000001 HC R&B MED SURG/OB

## 2023-07-22 PROCEDURE — 82330 ASSAY OF CALCIUM: CPT | Performed by: NURSE PRACTITIONER

## 2023-07-22 PROCEDURE — 250N000011 HC RX IP 250 OP 636: Performed by: NURSE PRACTITIONER

## 2023-07-22 PROCEDURE — 258N000003 HC RX IP 258 OP 636: Performed by: NURSE PRACTITIONER

## 2023-07-22 PROCEDURE — 83880 ASSAY OF NATRIURETIC PEPTIDE: CPT | Performed by: NURSE PRACTITIONER

## 2023-07-22 PROCEDURE — 82947 ASSAY GLUCOSE BLOOD QUANT: CPT | Performed by: ORTHOPAEDIC SURGERY

## 2023-07-22 PROCEDURE — 36415 COLL VENOUS BLD VENIPUNCTURE: CPT | Performed by: NURSE PRACTITIONER

## 2023-07-22 PROCEDURE — 250N000011 HC RX IP 250 OP 636: Mod: JZ | Performed by: ORTHOPAEDIC SURGERY

## 2023-07-22 PROCEDURE — 250N000013 HC RX MED GY IP 250 OP 250 PS 637: Performed by: ORTHOPAEDIC SURGERY

## 2023-07-22 PROCEDURE — 250N000013 HC RX MED GY IP 250 OP 250 PS 637: Performed by: PHYSICIAN ASSISTANT

## 2023-07-22 PROCEDURE — 80048 BASIC METABOLIC PNL TOTAL CA: CPT | Performed by: NURSE PRACTITIONER

## 2023-07-22 PROCEDURE — 36415 COLL VENOUS BLD VENIPUNCTURE: CPT | Performed by: ORTHOPAEDIC SURGERY

## 2023-07-22 PROCEDURE — 84443 ASSAY THYROID STIM HORMONE: CPT | Performed by: NURSE PRACTITIONER

## 2023-07-22 PROCEDURE — 82962 GLUCOSE BLOOD TEST: CPT

## 2023-07-22 PROCEDURE — 85018 HEMOGLOBIN: CPT | Performed by: ORTHOPAEDIC SURGERY

## 2023-07-22 PROCEDURE — 93010 ELECTROCARDIOGRAM REPORT: CPT | Performed by: INTERNAL MEDICINE

## 2023-07-22 PROCEDURE — 85027 COMPLETE CBC AUTOMATED: CPT | Performed by: NURSE PRACTITIONER

## 2023-07-22 PROCEDURE — 99207 PR NO BILLABLE SERVICE THIS VISIT: CPT | Performed by: NURSE PRACTITIONER

## 2023-07-22 PROCEDURE — 81001 URINALYSIS AUTO W/SCOPE: CPT | Performed by: NURSE PRACTITIONER

## 2023-07-22 PROCEDURE — 97535 SELF CARE MNGMENT TRAINING: CPT | Mod: GO

## 2023-07-22 PROCEDURE — 85018 HEMOGLOBIN: CPT | Performed by: NURSE PRACTITIONER

## 2023-07-22 PROCEDURE — 93005 ELECTROCARDIOGRAM TRACING: CPT

## 2023-07-22 PROCEDURE — 97165 OT EVAL LOW COMPLEX 30 MIN: CPT | Mod: GO

## 2023-07-22 PROCEDURE — 84484 ASSAY OF TROPONIN QUANT: CPT | Performed by: NURSE PRACTITIONER

## 2023-07-22 RX ORDER — CALCIUM GLUCONATE 20 MG/ML
1 INJECTION, SOLUTION INTRAVENOUS ONCE
Status: COMPLETED | OUTPATIENT
Start: 2023-07-22 | End: 2023-07-22

## 2023-07-22 RX ADMIN — OXYCODONE HYDROCHLORIDE 5 MG: 5 TABLET ORAL at 21:15

## 2023-07-22 RX ADMIN — SENNOSIDES AND DOCUSATE SODIUM 1 TABLET: 8.6; 5 TABLET ORAL at 21:15

## 2023-07-22 RX ADMIN — CHOLECALCIFEROL TAB 10 MCG (400 UNIT) 10 MCG: 10 TAB at 21:14

## 2023-07-22 RX ADMIN — SODIUM CHLORIDE, POTASSIUM CHLORIDE, SODIUM LACTATE AND CALCIUM CHLORIDE 250 ML: 600; 310; 30; 20 INJECTION, SOLUTION INTRAVENOUS at 15:30

## 2023-07-22 RX ADMIN — POLYETHYLENE GLYCOL 3350 17 G: 17 POWDER, FOR SOLUTION ORAL at 08:03

## 2023-07-22 RX ADMIN — ASPIRIN 325 MG: 325 TABLET, DELAYED RELEASE ORAL at 08:04

## 2023-07-22 RX ADMIN — KETOROLAC TROMETHAMINE 15 MG: 15 INJECTION, SOLUTION INTRAMUSCULAR; INTRAVENOUS at 12:58

## 2023-07-22 RX ADMIN — AMITRIPTYLINE HYDROCHLORIDE 25 MG: 25 TABLET, FILM COATED ORAL at 21:15

## 2023-07-22 RX ADMIN — HYDROMORPHONE HYDROCHLORIDE 0.2 MG: 0.2 INJECTION, SOLUTION INTRAMUSCULAR; INTRAVENOUS; SUBCUTANEOUS at 23:15

## 2023-07-22 RX ADMIN — ACETAMINOPHEN 975 MG: 325 TABLET, FILM COATED ORAL at 13:42

## 2023-07-22 RX ADMIN — OXYCODONE HYDROCHLORIDE 5 MG: 5 TABLET ORAL at 08:04

## 2023-07-22 RX ADMIN — KETOROLAC TROMETHAMINE 15 MG: 15 INJECTION, SOLUTION INTRAMUSCULAR; INTRAVENOUS at 02:39

## 2023-07-22 RX ADMIN — Medication 950 MG: at 16:57

## 2023-07-22 RX ADMIN — Medication 950 MG: at 21:15

## 2023-07-22 RX ADMIN — SENNOSIDES AND DOCUSATE SODIUM 1 TABLET: 8.6; 5 TABLET ORAL at 08:02

## 2023-07-22 RX ADMIN — ACETAMINOPHEN 975 MG: 325 TABLET, FILM COATED ORAL at 06:44

## 2023-07-22 RX ADMIN — HYDROXYZINE HYDROCHLORIDE 10 MG: 10 TABLET ORAL at 18:30

## 2023-07-22 RX ADMIN — ACETAMINOPHEN 975 MG: 325 TABLET, FILM COATED ORAL at 21:15

## 2023-07-22 RX ADMIN — METHOCARBAMOL 500 MG: 500 TABLET ORAL at 18:30

## 2023-07-22 RX ADMIN — ATORVASTATIN CALCIUM 10 MG: 10 TABLET, FILM COATED ORAL at 21:15

## 2023-07-22 RX ADMIN — LEVOTHYROXINE SODIUM 125 MCG: 75 TABLET ORAL at 06:44

## 2023-07-22 RX ADMIN — Medication 950 MG: at 08:02

## 2023-07-22 RX ADMIN — SODIUM CHLORIDE 500 ML: 9 INJECTION, SOLUTION INTRAVENOUS at 09:53

## 2023-07-22 RX ADMIN — CALCIUM GLUCONATE 1 G: 20 INJECTION, SOLUTION INTRAVENOUS at 21:28

## 2023-07-22 RX ADMIN — KETOROLAC TROMETHAMINE 15 MG: 15 INJECTION, SOLUTION INTRAMUSCULAR; INTRAVENOUS at 06:43

## 2023-07-22 ASSESSMENT — ACTIVITIES OF DAILY LIVING (ADL)
ADLS_ACUITY_SCORE: 22

## 2023-07-22 NOTE — PROGRESS NOTES
Orthopedic Surgery  Liliane Grayjamyt  2023  Admit Date:  2023  POD 1 Day Post-Op  S/P Procedure(s):  left total knee arthroplasty    Patient had a VV episode during therapy.  Still feeling very tired and asking what time it is.   nervous and tearful.  Discussed pain and recovery expectations with patient and spouse and that we will likely be staying overnight to make sure she feels solid before leaving.    Alert and orient to person and place, slightly confused regarding time of day  Vital Sign Ranges  Temperature Temp  Av.6  F (36.4  C)  Min: 97.3  F (36.3  C)  Max: 98.2  F (36.8  C)   Blood pressure Systolic (24hrs), Av , Min:90 , Max:164        Diastolic (24hrs), Av, Min:42, Max:83      Pulse Pulse  Av.4  Min: 51  Max: 63   Respirations Resp  Av.9  Min: 16  Max: 30   Pulse oximetry SpO2  Av.6 %  Min: 94 %  Max: 100 %       Left knee edema wear is clean, dry, and intact. Minimal erythema of the surrounding skin.  Bilateral calves are soft, non-tender.  left lower extremity is NVI.    Labs:  No results for input(s): POTASSIUM in the last 35882 hours.  Recent Labs   Lab Test 23  0910   HGB 11.2*  11.2*     No results for input(s): INR in the last 03864 hours.  Recent Labs   Lab Test 23  0910          A/P  1. S/p left TKA  2. VV episode - rapid response   Continue aspirin 325 mg every day  for DVT prophylaxis.     Mobilize with PT/OT WBAT.     Continue current pain regimen   Appreciate Medicine assistance.    2. Disposition   Anticipate d/c pending medical stability tomorrow    Kjerstin L Foss, PA-C     As above,     Coy Fowler MD

## 2023-07-22 NOTE — PROGRESS NOTES
MD Notification    Notified Person: APRN    Notified Person Name: Alix Boland    Notification Date/Time: 1340, 7/22/23    Notification Interaction: [employer] messaging    Purpose of Notification: BP dropped again to 88/39, MAP 55. HR 67. sitting in bed- currently does not feel dizzy/lightheaded. Do you want to put her on continuous fluids?       Orders Received: additional 250ml bolus ordered, encourage fluids, hbg recheck.    Comments:

## 2023-07-22 NOTE — PROGRESS NOTES
"Orientation/Cognitive: A&Ox4  Mobility Level/Assist Equipment: A2/walker/gaitbelt  Fall Risk (Y/N): Yes  Behavior Concerns: Green  Pain Management: Schedule Tylenol and Toradol. PRN oxycodone and dilaudid  Tele/VS/O2: VSS unstable with oxygen greater than 96%  TELE: Normal sinus/ Sinus jose david  ABNL Lab/BG: Hgb 11.2, 9.6 , Calcium ionized 3.9  Diet: Regular  Bowel/Bladder: Continent  Skin Concerns: Right and left PIV  Drains/Devices: None  Tests/Procedures for next shift: Monitor BP and heart rate. Encourage fluids due to dehydration.  Anticipated DC date & active delays: TBD    [id number]   RRT around 9:00 am. IVF NS bolus 500 given once along with STAT labs. Second dose of IVF LR given per order. UA collected. ECHO was order but have not been done yet. Pt was up to the bedside commode with A2 which she tolerated okay with minimal lightheaded.    /42 (BP Location: Left arm, Patient Position: Supine, Cuff Size: Adult Regular)   Pulse 75   Temp 97.7  F (36.5  C) (Oral)   Resp 19   Ht 1.626 m (5' 4\")   Wt 98.4 kg (217 lb)   SpO2 98%   BMI 37.25 kg/m        "

## 2023-07-22 NOTE — OP NOTE
Operative Note    Liliane Romo MRN# [id number]   YOB: 1945 Age: 78 year old   Date of Procedure: 7/21/2023    1st Assistant:  Annamaria Ac PA-C - Assisting  BARBARA Blake OPA-GILBERTO - Assisting    PREOPERATIVE DIAGNOSIS: Left knee osteoarthritis, failure to respond to conservative management.     POSTOPERATIVE DIAGNOSIS: Left knee osteoarthritis, failure to respond to conservative management.     PROCEDURE: Left total knee arthroplasty, Depuy Attune components, Cruciate retaining, medial stabilized.  Tourniquet-less technique.     DESCRIPTION OF PROCEDURE: Liliane Romo was brought to the operating room. After satisfactory anesthesia, the left lower extremity was prepped and draped in the usual sterile fashion. The patient received 1 gram of Ancef and tranexamic acid preoperatively.     Straight anterior incision was made. Dissection was carried down to the extensor mechanism. Medial arthrotomy was made.  Mid vastus exposure was developed.  Advanced osteoarthritis was noted. Patella was exposed, measured and resected to accept a size 35mm, asymmetric patella. The knee was then flexed up. Intramedullary guide was placed at 5 degrees as per the preoperative plan. The distal femoral cut was made followed by anteroposterior cuts and chamfer cuts. Femur sized to be a size 5 Narrow CR. Attention was then directed to the tibia. Tibial cut was made perpendicular to the long axis of the tibia in both AP and lateral planes, 3 degree posterior slope. PCL was recessed.  The tibia sized to be a size 3. Soft tissue balancing was performed. Trial reduction was performed and with a 8-mm MS insert, there was excellent soft tissue balancing, patellar tracking, alignment as well as motion. Trial components were removed. Tibial baseplate was prepared for size 3 baseplate. All 3 components were cemented in place without difficulty. Excess cement was removed.  A three minute betadine soak was performed.  The wound  was thoroughly irrigated and tissues were infiltrated with toradol/marcaine mixture.  The real 8-mm MS insert was impacted in place.  One gram of vancomycin powder was placed deep and superficial prior to closure. The extensor mechanism was closed in sequential layers including a running number 1 Stratafix, then augmented with interrupted 0 Vicryl and 0 ethibond suture. The skin was closed with interrupted 2-0 Vicryl subcutaneously, then a running 2-0 Stratafix, followed by a subcuticular 3-0 monocryl.  A mesh dressing with skin glue was applied. A sterile dressing was applied. The patient left the operating room in satisfactory condition.  A skilled first assistant was necessary for this procedure for assistance with patient positioning, prepping, draping, surgical visualization, performance of the repair, wound closure, and application of the dressing.    MD GERALDO Hines MD

## 2023-07-22 NOTE — CODE/RAPID RESPONSE
Rapid Response Team Note  7/22/2023  0930    Assessment   In assessment a rapid response was called on Liliane Romo due to hypotension. This presentation is likely due to Post op hypotension ( hypovolemia vs vasovagal)      Plan   -  IV fluid - 500 ml NS bolus x1  - blood sugar check  - q15 min vital signs x 1 hour  - CBC, basic metabolic panel, Troponin x1  - ECG  - apply telemetry to monitor bradycardia  - hold atenolol   - consider more work up for hypotensive, bradycardia with echo  -  Orthopedic service was updated by myself   - Hospital medicine will do consult later today ; recommend to delay discharge planned today due to syncope and ensure no recurrence    -  Disposition: The patient will remain on the current unit. We will continue to monitor this patient closely.  -  Reassessment and plan follow-up will be performed by the primary team        OUTCOME:  After 500 ml of NS fluid bolus, and rest - her SBP improved to 110 with MAP of 70.  Labs reviewed - hemoglobin is 11 (was 13.6 preop) , lytes stable, renal labs stable.  She is sitting upright and will order/eat breakfast.  She feels well overall and has no complaints.      Hospitalist service  will do formal consult later this day.    TT spent by RRT provider:  30 minutes     ROCKY Rivers CNP  [phone] (cell - call or text)  Or page via [facility] Paging/Directory    Hospital Course   Brief Summary of events leading to rapid response:   Post op Day #1 - left TKA.  OR notes reviewed - uneventful case.    Am of 7/22/2023  - Was up to bathroom to void and then up to chair.  Had dose of oxycodone 5 mg.   Was noted to have decreased LOC by RN.  Assisted to bed.  Noted to have have hypotension with SBP of 80-90 and HR of 50's.    RRT team arrived - full exam done.  No focal neuro deficits noted.  Persistent BP in 90's systolic with MAP of 62.   No chest pain, no dyspnea, no nausea.  Minimal knee pain.    PMH reviewed - tremor, hypothyroid, HLD,  HTN    Meds reviewed - atenolol 25 mg (did not have evening dose on ).  No other BP or heart medications     Admission Diagnosis:   Left knee DJD [M17.12]  Status post total knee replacement, left [Z96.652]    Physical Exam   Temp: 98.2  F (36.8  C) Temp  Min: 97.4  F (36.3  C)  Max: 98.2  F (36.8  C)  Resp: 19 Resp  Min: 16  Max: 30  SpO2: 94 % SpO2  Min: 94 %  Max: 100 %  Pulse: 55 Pulse  Min: 51  Max: 63    No data recorded  BP: 108/46 Systolic (24hrs), Av , Min:90 , Max:164   Diastolic (24hrs), Av, Min:42, Max:83     I/Os: I/O last 3 completed shifts:  In: 1340 [P.O.:240; I.V.:1100]  Out: 1130 [Urine:1130]     Exam:   General: awake, supine, slightly pale  Mental Status: AAOx4.  Neuro  - no weakness to upper or lower extremities.  No drift. Pupils are 3 mm and reactive, EOMI  HEENT - no acute  Lungs - clear bilaterally  CV - regular, jose david (sinus) no murmur  abd - soft round, non tender, + BS  Ext -- left leg is surgical leg (L TKA) - usual swelling, no dressing heme or drainage.  Pulses are intact    Significant Results and Procedures   Lactic Acid: No results for input(s): LACT, LACTS in the last 15531 hours.  CBC:   Recent Labs   Lab Test 23  0910   HGB 11.2*        Sepsis Evaluation   The patient is not known to have an infection.  NO EVIDENCE OF SEPSIS at this time.  Vital sign, physical exam, and lab findings are due to lack of symptoms .

## 2023-07-22 NOTE — PROGRESS NOTES
Date/Time 7/21-7/22 1343-2701 AM    Patient vital signs are at baseline: Yes  Patient able to ambulate as they were prior to admission or with assist devices provided by therapies during their stay:  Yes  Patient MUST void prior to discharge:  Yes  Patient able to tolerate oral intake:  Yes  Pain has adequate pain control using Oral analgesics:  Yes  Does patient have an identified :  Yes  Has goal D/C date and time been discussed with patient:  Yes      Diagnosis: L  total knee arthoplast   POD#:1  Mental Status: A/Ox4  Activity/dangle:A1/GB/walker  Diet: regular  Pain: Scheduled Tyelnol and Toradol  Hernández/Voiding: cont B/B  Tele/Restraints/Iso: NA  Skin: NA  02/LDA: VSS on RA, PIV  SL  D/C Date: TBD  Other Info:

## 2023-07-22 NOTE — PROGRESS NOTES
[facility] brief RRT follow up:    Received sign out from colleague, ROCKY Rivers, CNP; please refer to initial RRT note for further details.  Was asked to follow up on 2200 HGB.    Recent Labs   Lab 07/23/23  0043 07/22/23  1611 07/22/23  0910   WBC  --   --  10.8   HGB 10.0* 9.6* 11.2*  11.2*   HCT  --   --  34.5*   MCV  --   --  93   PLT  --   --  251     ROCKY Infante, CNP  Hospitalist-[place] [facility]  Hospitalist Service  Securely message with AutoAlert (more info)  Text page via [facility] Paging/Directory     No charge.

## 2023-07-22 NOTE — CONSULTS
[facility]  Consult Note - Hospitalist Service  Date of Admission:  7/21/2023  Consult Requested by: DR Fowler  Reason for Consult: Hypotension   HPI:    Liliane Romo is a 78 year old female admitted on 7/21/2023. She had left TKA on 7/21 per DR Fowler .  Surgical notes reviewed, uneventful case.  She was up with therapy day of surgery    7/22/2023 - she was up to bathroom, then sat up in chair.  Took 5 mg of oxycodone.  RN was notified by PT that she was not able to be woke up.  Noted to have a few works sternal rub.  BP low (80/40)  Assisted back to bed.    RRT called - see separate note by this writer.  Interventions included neuro exam (nonfocal), EEG (no acute, but sinus jose david), labs (hemoglobin was 11 down from preop of 13). Trop neg.  NS bolus given 500 ml and she improved her BP for a few hours after.  She was able, denies chest pain or dyspnea.    She has had poor PO intake since surgery - not drinking much fluid, but is able too.      She did not receive her HS dose of atenolol on 7/21     Called by RN later in afternoon with recurrence of mild hypotension again.        Assessment & Plan    1.  Hypotension -   - etiology unclear.  Her baseline BP appears to be in 100-120.  Has not received any antihypertensives.  No severe anemia, was taking PO.  Not on chronic steroids.  Does not appear septic  - fluid boluses given - suspect hypovolemia/dehydration with NPO status with surgery and poor po intake post op.   - ECG = sinus jose david, no acute ischemic findings  - no prior echos or heart testing in EMR    Plan:  Echo ordered,trend troponin, check BNP  recheck hemoglobin, UA (early sepsis), TSH, ionized calcium, Orthostatic BP's and PUSH po     2.  Bradycardia - present during hypotension.  - did not have BB in past 24 hours, no other rate control meds  - HR did improve after fluids.     - telemetry ordered    3. Post op - left TKA  - uneventful surgery  - PT is ordered, but due to  "hypotension; not able to fully participate on 7/22/2023  - ASA for DVT ppx   - hold discharge due to hypotension     4.  Hypothyroidism -   PTA diagnosis, followed by DR Oscar with endocrinology  Was due to decrease her synthroid dose to 112 mcg, but has not done so.     Continue synthroid at 125 unless TSH low (checking TSH) - then reduce to 112 mcg as per endocrines outpt recs     5. Pain control - currently has adequate pain control                The patient's care was discussed with the Attending Physician, Dr. DR Garcia.    Clinically Significant Risk Factors Present on Admission                       # Obesity: Estimated body mass index is 37.25 kg/m  as calculated from the following:    Height as of this encounter: 1.626 m (5' 4\").    Weight as of this encounter: 98.4 kg (217 lb).            ROCKY Rivers [facility]  Hospitalist Service  Securely message with [facility] (more info)  Text page via [facility] Paging/Directory   ______________________________________________________________________    Chief Complaint   Hypotension     History is obtained from the patient    History of Present Illness   Liliane Romo is a 78 year old female who had left TKA on 7/21 per DR Chang.  Was in usual state of health prior to surgery.    Her PCP note from pre-op reviewed.  Clinic BP was 130 systolic and HR was in 60's       Past Medical History    Past Medical History:   Diagnosis Date     Arthritis      BPPV (benign paroxysmal positional vertigo)      Esophageal stricture     Dilation x 2     HLD (hyperlipidemia)      Hypertension      Migraine      Obese      Thyroid disease      Tremor        Past Surgical History   Past Surgical History:   Procedure Laterality Date     CERVICAL POLYPECTOMY       CL AFF SURGICAL PATHOLOGY Left     LEFT THYROID LOBECTOMY AND ISTHMECTOMYAND ABLATION     EYE SURGERY Bilateral     cataract     GYN SURGERY      tubal ligation     IR ESOPHAGEAL STENTING      x2     THYROIDECTOMY        wisdom teeth "         Medications   I have reviewed this patient's current medications       Social History   I have reviewed this patient's social history and updated it with pertinent information if needed. Lives with    Social History     Tobacco Use     Smoking status: Never     Smokeless tobacco: Never   Substance Use Topics     Alcohol use: Yes     Comment: 1 glass wine per week        Physical Exam   Vital Signs: Temp: 98  F (36.7  C) Temp src: Oral BP: (!) 89/62 (MAP 71) Pulse: 75   Resp: 19 SpO2: 100 % O2 Device: None (Room air) Oxygen Delivery: 2 LPM  Weight: 217 lbs 0 oz    General Appearance: Awake, alert, but fatigued appearing    Respiratory: lungs are clear anterior   Cardiovascular:  Bradycardia, regular   GI: soft abdomen, active bowel sounds   Skin: left knee incision - intact without draiange  Other: Neuro - alert, oriented, moves all extremities equally, no drift, pupils are 3 mm and reactive  No facial droop      Medical Decision Making       60 MINUTES SPENT BY ME on the date of service doing chart review, history, exam, documentation & further activities per the note.    The 60 minutes of time includes time spent with rapid response team activation, assesment, interventions, and reviewing of tests (ecg, labs, vitals, chart review and numerous bedside exams of this patient.    Data     I have personally reviewed the following data over the past 24 hrs:    10.8  \   11.2 (L); 11.2 (L)   / 251     140 106 18.8 /  125 (H)   4.2 23 0.91 \       Trop: 12 BNP: N/A       Imaging results reviewed over the past 24 hrs:   No results found for this or any previous visit (from the past 24 hour(s)).

## 2023-07-22 NOTE — PROGRESS NOTES
RTT was call on on the patient around 0925 am. Writer received a call from the physical therapy via [employer] stating that the patient was unable to wake up.Upon arrival into the room the writer made multiple verbal attempts at waking the patient up but was not successful and BP was  80/40. Pt was able to say few words with sternal rub but unable to stay awake. Pt was transferred to the bed and place in trendelenburg position when the RTT code team arrived. IVF  bolus was given, STAT labs and EKG.  BP 80/40, 96/46, 107/46, 114/49

## 2023-07-22 NOTE — UTILIZATION REVIEW
Admission Status; Secondary Review Determination       Under the authority of the Utilization Management Committee, the utilization review process indicated a secondary review on the above patient. The review outcome is based on review of the medical records, discussions with staff, and applying clinical experience noted on the date of the review.     (x) Inpatient Status Appropriate - This patient's medical care is consistent with medical management for inpatient care and reasonable inpatient medical practice.     RATIONALE FOR DETERMINATION      Patient requires inpatient admission versus short stay observation or outpatient treatment for the following reasons:    78-year-old woman with PMHx of OA, Obesity, HTN, HLP, BPPV, Resting upper extremity tremor, Iatrogenic Hypoparathyroidism, Iatrogenic Hypothyroidism, Migraines, History of esophageal strictures underwent elective left knee arthroplasty on July 21, 2023.  Postop day 1, rapid response was called because the patient was in physical therapy and he was not waking up despite multiple attempts, and by the nurse was able to wake up the patient the blood pressure was 80/40, the patient was unable to stay awake.  The patient was transferred to the bed, placed in Trendelenburg position and given IV fluids.  Blood pressure stabilized at 96/46  - 114/49, but in the afternoon dropped again to 91/35.  Hemoglobin dropped on postop day 1 from 11.2 in the morning to 9.6 in the afternoon.    78-year-old woman admitted for elective left knee arthroplasty develops post op day 1 episode of unresponsiveness, hypotension, and acute anemia, close monitoring of the vital signs, neurologic status and hemoglobin.      The expected length of stay at the time of admission was more than 2 nights because of the severity of illness, intensity of service provided, and risk for adverse outcome. Inpatient admission is appropriate.     Dr Coy Fowler notified     This document was  produced using voice recognition software       The information on this document is developed by the utilization review team in order for the business office to ensure compliance. This only denotes the appropriateness of proper admission status and does not reflect the quality of care rendered.   The definitions of Inpatient Status and Observation Status used in making the determination above are those provided in the CMS Coverage Manual, Chapter 1 and Chapter 6, section 70.4.   Sincerely,   ERIS JAEGER MD   Utilization Review  Physician Advisor  [facility]

## 2023-07-22 NOTE — PROGRESS NOTES
07/22/23 0822   Appointment Info   Signing Clinician's Name / Credentials (OT) Ángel Molina, OTR/L   Quick Adds   Quick Adds Certification   Living Environment   People in Home spouse   Current Living Arrangements house   Home Accessibility stairs to enter home;stairs within home   Number of Stairs, Main Entrance 1   Stair Railings, Main Entrance railing on left side (ascending)   Number of Stairs, Within Home, Primary greater than 10 stairs   Stair Railings, Within Home, Primary railing on left side (ascending)   Transportation Anticipated car, drives self;family or friend will provide   Living Environment Comments Pt reports that she lives w/ spouse in a house. Reports one step to enter w/ L sided railing. Reports a flight of 17 stairs to her bedroom w/ L sided railing.  is available for assist as needed at home. Walk in shower w/ shower chair. No grab bars.   Self-Care   Usual Activity Tolerance good   Current Activity Tolerance moderate   Equipment Currently Used at Home none   Fall history within last six months no   Activity/Exercise/Self-Care Comment Reports typically independent at baseline w/ all mobility and I/ADLs w/o AD.   General Information   Onset of Illness/Injury or Date of Surgery 07/21/23   Referring Physician Coy Fowler MD   Patient/Family Therapy Goal Statement (OT) To return home   Additional Occupational Profile Info/Pertinent History of Current Problem Liliane Romo is a 78 year old female who underwent L TKA   Existing Precautions/Restrictions fall   Left Lower Extremity (Weight-bearing Status) weight-bearing as tolerated (WBAT)   Cognitive Status Examination   Orientation Status orientation to person, place and time   Follows Commands follows one-step commands;over 90% accuracy   Pain Assessment   Patient Currently in Pain Yes, see Vital Sign flowsheet   Range of Motion Comprehensive   General Range of Motion bilateral upper extremity ROM WFL   Comment, General Range of  Motion ROM limitations at surgical knee   Strength Comprehensive (MMT)   General Manual Muscle Testing (MMT) Assessment no strength deficits identified   Coordination   Upper Extremity Coordination No deficits were identified   Transfers   Transfers sit-stand transfer;toilet transfer   Sit-Stand Transfer   Sit-Stand [place] (Transfers) contact guard   Assistive Device (Sit-Stand Transfers) walker, front-wheeled   Toilet Transfer   [place] Level (Toilet Transfer) contact guard   Assistive Device (Toilet Transfer) grab bars/safety frame;walker, front-wheeled   Activities of Daily Living   BADL Assessment/Intervention lower body dressing;toileting   Lower Body Dressing Assessment/Training   [place] Level (Lower Body Dressing) minimum assist (75% patient effort)   Toileting   [place] Level (Toileting) contact guard assist   Clinical Impression   Criteria for Skilled Therapeutic Interventions Met (OT) Yes, treatment indicated   OT Diagnosis Decreased independence with I/ADLs   OT Problem List-Impairments impacting ADL problems related to;activity tolerance impaired;flexibility;range of motion (ROM);pain;post-surgical precautions   Assessment of Occupational Performance 3-5 Performance Deficits   Identified Performance Deficits Dressing, bathing, toileting, functional mobility, home mgmt   Planned Therapy Interventions (OT) ADL retraining;IADL retraining;cognition;strengthening   Clinical Decision Making Complexity (OT) low complexity   Risk & Benefits of therapy have been explained evaluation/treatment results reviewed;care plan/treatment goals reviewed;risks/benefits reviewed;current/potential barriers reviewed;participants voiced agreement with care plan;participants included;patient;spouse/significant other   OT Total Evaluation Time   OT Eval, Low Complexity Minutes (26144) 10   Therapy Certification   Medical Diagnosis L TKA   Start of Care Date 07/22/23   Certification date from 07/22/23    Certification date to 07/24/23   OT Goals   Therapy Frequency (OT) One time eval and treatment   OT Predicted Duration/Target Date for Goal Attainment 07/22/23   OT Goals Lower Body Dressing;Toilet Transfer/Toileting;Hygiene/Grooming   OT: Hygiene/Grooming supervision/stand-by assist;within precautions;while standing;Goal Met   OT: Lower Body Dressing Supervision/stand-by assist;using adaptive equipment;within precautions;Goal Met   OT: Toilet Transfer/Toileting toilet transfer;Supervision/stand-by assist;using adaptive equipment;within precautions;Goal Met   Interventions   Interventions Quick Adds Self-Care/Home Management   Self-Care/Home Management   Self-Care/Home Mgmt/ADL, Compensatory, Meal Prep Minutes (99452) 30   Symptoms Noted During/After Treatment (Meal Preparation/Planning Training) fatigue;increased pain   Treatment Detail/Skilled Intervention Seated in recliner at encounter and agreeable to OT. Instructing pt to ambulate <> bathroom and complete toilet transfer for transfer training. Simulating for home environment. Completing toilet transfer x2 (w/ and w/o over the toilet commode). Pt requiring min verbal cueing for hand placement and pivot toilet. SBA tranfer w/ commode and CGA w/o. Edu on where/how to purchase 3 in one commode. Seated in recliner for ADL training. Edu and demo on use of reacher for LB dressing. Pt shows improved indep w/ this ADL w/ AE, donning pants SBA w/ min verbal cueing for use of reacher. Edu and demo on car transfer. Providing edu on completing ADLs in home env, upstairs vs main level pending indep w/ stairs. Left w/ alarm on and all needs in reach.   OT Discharge Planning   OT Discharge Recommendation (DC Rec)   (defer to Ortho)   OT Rationale for DC Rec Patient below baseline, requiring Ax1 for all mobility and self-cares. Recommend home with assist from  for functional transfers and ADLs. Recommend assist with all heavier IADLs such as laundry, meal prep,  shopping, driving, and home management.   OT Brief overview of current status SB-CGA with FWW for toilet transfer.   Total Session Time   Timed Code Treatment Minutes 30   Total Session Time (sum of timed and untimed services) 40    [facility]  OUTPATIENT OCCUPATIONAL THERAPY  EVALUATION  PLAN OF TREATMENT FOR OUTPATIENT REHABILITATION  (COMPLETE FOR INITIAL CLAIMS ONLY)  Patient's Last Name, First Name, M.I.  YOB: 1945  Liliane Romo                          Provider's Name  [facility] Medical Record No.  [id number]                             Onset Date:  07/21/23   Start of Care Date:  07/22/23   Type:     ___PT   _X_OT   ___SLP Medical Diagnosis:  L TKA                    OT Diagnosis:  Decreased independence with I/ADLs Visits from SOC:  1     See note for plan of treatment, functional goals and certification details    I CERTIFY THE NEED FOR THESE SERVICES FURNISHED UNDER        THIS PLAN OF TREATMENT AND WHILE UNDER MY CARE     (Physician co-signature of this document indicates review and certification of the therapy plan).

## 2023-07-23 ENCOUNTER — APPOINTMENT (OUTPATIENT)
Dept: CARDIOLOGY | Facility: CLINIC | Age: 78
DRG: 470 | End: 2023-07-23
Attending: NURSE PRACTITIONER
Payer: COMMERCIAL

## 2023-07-23 ENCOUNTER — APPOINTMENT (OUTPATIENT)
Dept: PHYSICAL THERAPY | Facility: CLINIC | Age: 78
DRG: 470 | End: 2023-07-23
Attending: ORTHOPAEDIC SURGERY
Payer: COMMERCIAL

## 2023-07-23 ENCOUNTER — APPOINTMENT (OUTPATIENT)
Dept: OCCUPATIONAL THERAPY | Facility: CLINIC | Age: 78
DRG: 470 | End: 2023-07-23
Attending: ORTHOPAEDIC SURGERY
Payer: COMMERCIAL

## 2023-07-23 LAB
ERYTHROCYTE [DISTWIDTH] IN BLOOD BY AUTOMATED COUNT: 13.2 % (ref 10–15)
GLUCOSE SERPL-MCNC: 137 MG/DL (ref 70–99)
HCT VFR BLD AUTO: 29.4 % (ref 35–47)
HGB BLD-MCNC: 10 G/DL (ref 11.7–15.7)
HGB BLD-MCNC: 9.7 G/DL (ref 11.7–15.7)
HGB BLD-MCNC: 9.7 G/DL (ref 11.7–15.7)
LVEF ECHO: NORMAL
MCH RBC QN AUTO: 30.1 PG (ref 26.5–33)
MCHC RBC AUTO-ENTMCNC: 33 G/DL (ref 31.5–36.5)
MCV RBC AUTO: 91 FL (ref 78–100)
PLATELET # BLD AUTO: 160 10E3/UL (ref 150–450)
RBC # BLD AUTO: 3.22 10E6/UL (ref 3.8–5.2)
WBC # BLD AUTO: 6.5 10E3/UL (ref 4–11)

## 2023-07-23 PROCEDURE — 120N000001 HC R&B MED SURG/OB

## 2023-07-23 PROCEDURE — 97110 THERAPEUTIC EXERCISES: CPT | Mod: GP | Performed by: PHYSICAL THERAPIST

## 2023-07-23 PROCEDURE — 255N000002 HC RX 255 OP 636: Performed by: ORTHOPAEDIC SURGERY

## 2023-07-23 PROCEDURE — 258N000003 HC RX IP 258 OP 636: Performed by: INTERNAL MEDICINE

## 2023-07-23 PROCEDURE — 85014 HEMATOCRIT: CPT | Performed by: STUDENT IN AN ORGANIZED HEALTH CARE EDUCATION/TRAINING PROGRAM

## 2023-07-23 PROCEDURE — 85018 HEMOGLOBIN: CPT | Performed by: NURSE PRACTITIONER

## 2023-07-23 PROCEDURE — 999N000208 ECHOCARDIOGRAM COMPLETE

## 2023-07-23 PROCEDURE — 97116 GAIT TRAINING THERAPY: CPT | Mod: GP | Performed by: PHYSICAL THERAPIST

## 2023-07-23 PROCEDURE — 93306 TTE W/DOPPLER COMPLETE: CPT | Mod: 26 | Performed by: INTERNAL MEDICINE

## 2023-07-23 PROCEDURE — 85018 HEMOGLOBIN: CPT | Performed by: ORTHOPAEDIC SURGERY

## 2023-07-23 PROCEDURE — 250N000013 HC RX MED GY IP 250 OP 250 PS 637: Performed by: PHYSICIAN ASSISTANT

## 2023-07-23 PROCEDURE — 250N000013 HC RX MED GY IP 250 OP 250 PS 637: Performed by: ORTHOPAEDIC SURGERY

## 2023-07-23 PROCEDURE — 99232 SBSQ HOSP IP/OBS MODERATE 35: CPT | Performed by: STUDENT IN AN ORGANIZED HEALTH CARE EDUCATION/TRAINING PROGRAM

## 2023-07-23 PROCEDURE — 97535 SELF CARE MNGMENT TRAINING: CPT | Mod: GO

## 2023-07-23 PROCEDURE — 82947 ASSAY GLUCOSE BLOOD QUANT: CPT | Performed by: ORTHOPAEDIC SURGERY

## 2023-07-23 PROCEDURE — 36415 COLL VENOUS BLD VENIPUNCTURE: CPT | Performed by: ORTHOPAEDIC SURGERY

## 2023-07-23 PROCEDURE — 97530 THERAPEUTIC ACTIVITIES: CPT | Mod: GP | Performed by: PHYSICAL THERAPIST

## 2023-07-23 PROCEDURE — 36415 COLL VENOUS BLD VENIPUNCTURE: CPT | Performed by: NURSE PRACTITIONER

## 2023-07-23 RX ORDER — SODIUM CHLORIDE 9 MG/ML
INJECTION, SOLUTION INTRAVENOUS CONTINUOUS
Status: DISCONTINUED | OUTPATIENT
Start: 2023-07-23 | End: 2023-07-23

## 2023-07-23 RX ORDER — OXYCODONE HYDROCHLORIDE 5 MG/1
5 TABLET ORAL EVERY 4 HOURS PRN
Status: DISCONTINUED | OUTPATIENT
Start: 2023-07-23 | End: 2023-07-25 | Stop reason: HOSPADM

## 2023-07-23 RX ORDER — SODIUM CHLORIDE, SODIUM LACTATE, POTASSIUM CHLORIDE, CALCIUM CHLORIDE 600; 310; 30; 20 MG/100ML; MG/100ML; MG/100ML; MG/100ML
INJECTION, SOLUTION INTRAVENOUS CONTINUOUS
Status: DISCONTINUED | OUTPATIENT
Start: 2023-07-23 | End: 2023-07-24

## 2023-07-23 RX ORDER — IBUPROFEN 600 MG/1
600 TABLET, FILM COATED ORAL EVERY 6 HOURS PRN
Status: DISCONTINUED | OUTPATIENT
Start: 2023-07-23 | End: 2023-07-25 | Stop reason: HOSPADM

## 2023-07-23 RX ORDER — CELECOXIB 100 MG/1
100 CAPSULE ORAL 2 TIMES DAILY
Status: DISCONTINUED | OUTPATIENT
Start: 2023-07-23 | End: 2023-07-23

## 2023-07-23 RX ADMIN — Medication 950 MG: at 21:10

## 2023-07-23 RX ADMIN — OXYCODONE HYDROCHLORIDE 5 MG: 5 TABLET ORAL at 09:30

## 2023-07-23 RX ADMIN — SODIUM CHLORIDE, POTASSIUM CHLORIDE, SODIUM LACTATE AND CALCIUM CHLORIDE: 600; 310; 30; 20 INJECTION, SOLUTION INTRAVENOUS at 01:10

## 2023-07-23 RX ADMIN — POLYETHYLENE GLYCOL 3350 17 G: 17 POWDER, FOR SOLUTION ORAL at 09:30

## 2023-07-23 RX ADMIN — LEVOTHYROXINE SODIUM 125 MCG: 75 TABLET ORAL at 06:54

## 2023-07-23 RX ADMIN — ACETAMINOPHEN 975 MG: 325 TABLET, FILM COATED ORAL at 21:10

## 2023-07-23 RX ADMIN — ASPIRIN 325 MG: 325 TABLET, DELAYED RELEASE ORAL at 09:28

## 2023-07-23 RX ADMIN — SENNOSIDES AND DOCUSATE SODIUM 1 TABLET: 8.6; 5 TABLET ORAL at 09:30

## 2023-07-23 RX ADMIN — ACETAMINOPHEN 975 MG: 325 TABLET, FILM COATED ORAL at 05:10

## 2023-07-23 RX ADMIN — HUMAN ALBUMIN MICROSPHERES AND PERFLUTREN 9 ML: 10; .22 INJECTION, SOLUTION INTRAVENOUS at 10:00

## 2023-07-23 RX ADMIN — Medication 950 MG: at 09:30

## 2023-07-23 RX ADMIN — SODIUM CHLORIDE, POTASSIUM CHLORIDE, SODIUM LACTATE AND CALCIUM CHLORIDE: 600; 310; 30; 20 INJECTION, SOLUTION INTRAVENOUS at 23:44

## 2023-07-23 RX ADMIN — ACETAMINOPHEN 975 MG: 325 TABLET, FILM COATED ORAL at 13:21

## 2023-07-23 RX ADMIN — SENNOSIDES AND DOCUSATE SODIUM 1 TABLET: 8.6; 5 TABLET ORAL at 20:04

## 2023-07-23 RX ADMIN — ATORVASTATIN CALCIUM 10 MG: 10 TABLET, FILM COATED ORAL at 20:04

## 2023-07-23 RX ADMIN — AMITRIPTYLINE HYDROCHLORIDE 25 MG: 25 TABLET, FILM COATED ORAL at 21:10

## 2023-07-23 ASSESSMENT — ACTIVITIES OF DAILY LIVING (ADL)
ADLS_ACUITY_SCORE: 22

## 2023-07-23 NOTE — PLAN OF CARE
Goal Outcome Evaluation:    Date/Time: 7/23/23     Trauma/Ortho/Medical (Choose one) ortho    Diagnosis: L TKA  POD#: 2  Mental Status: A&Ox4  Activity/dangle: up with assist of one and walker to BR  Diet: regular  Pain: tylenol/oxycodone, dose decreased to due drowsiness  Hernándze/Voiding: voiding  Tele/Restraints/Iso: telemetry NSR, echo completed.  02/LDA: IVF  D/C Date: TBD  Other Info: pt continuing to work with PT, possible TCU placement

## 2023-07-23 NOTE — PROGRESS NOTES
[facility]    Medicine Progress Note - Hospitalist Service    Date of Admission:  7/21/2023  Date of Service: 07/23/2023    Assessment & Plan      Liliane Romo is a 78 year old female admitted on 7/21/2023. She had left TKA on 7/21 per DR Fowler .  Surgical notes reviewed, uneventful case.  She was up with therapy day of surgery     7/22/2023 - she was up to bathroom, then sat up in chair.  Took 5 mg of oxycodone.  RN was notified by PT that she was not able to be woke up.  Noted to have a few works sternal rub.  BP low (80/40)  Assisted back to bed.     RRT called - see separate note by this writer.  Interventions included neuro exam (nonfocal), EEG (no acute, but sinus jose david), labs (hemoglobin was 11 down from preop of 13). Trop neg.  NS bolus given 500 ml and she improved her BP for a few hours after.  She was able, denies chest pain or dyspnea.    She has had poor PO intake since surgery - not drinking much fluid, but is able too.       She did not receive her HS dose of atenolol on 7/21      Called by RN later in afternoon with recurrence of mild hypotension again.             Assessment & Plan  1.  Hypotension  Assessment: etiology unclear.  Her baseline BP appears to be in 100-120.  Has not received any antihypertensives.  No severe anemia, was taking PO.  Not on chronic steroids.  Does not appear septic. Fluid boluses were given - suspect hypovolemia/dehydration with NPO status with surgery and poor po intake post op.   - ECG = sinus jose david, no acute ischemic findings  - no prior echos or heart testing in EMR   Plan:    - ECHO -> Hyperdynamic left ventricular function The visual ejection fraction is >70%.  The left ventricle is normal in size. Right ventricular systolic pressure is elevated, consistent with moderate pulmonary hypertension. The right ventricle is normal in structure, function and size. The left atrium is moderately dilated. Sinus rhythm was noted.  - Troponin wnl  -  "TSH wnl  - Hgb stable today 9.7  - Ionized Ca low -> s/p 1g Calcium gluconate     2.  Bradycardia - present during hypotension.  - did not have BB in past 24 hours, no other rate control meds  - HR did improve after fluids.    - Telemetry ordered     3. Post op - left TKA  - uneventful surgery  - PT is ordered, but due to hypotension; not able to fully participate on 7/22/2023  - ASA for DVT ppx      4.  Hypothyroidism -   PTA diagnosis, followed by DR Oscar with endocrinology  Was due to decrease her synthroid dose to 112 mcg, but has not done so.     Continue synthroid at 125 unless TSH low (checking TSH) - then reduce to 112 mcg as per endocrines outpt recs      5. Pain control - currently has adequate pain control        Diet: Discharge Instruction - Regular Diet Adult  Advance Diet as Tolerated: Regular Diet Adult    DVT Prophylaxis: Pneumatic Compression Devices  Hernández Catheter: Not present  Lines: None     Cardiac Monitoring: ACTIVE order. Indication: Bradycardias (48 hours)  Code Status: Full Code      Clinically Significant Risk Factors Present on Admission          # Hypocalcemia: Lowest iCa = 3.9 mg/dL in last 2 days, will monitor and replace as appropriate              # Obesity: Estimated body mass index is 37.25 kg/m  as calculated from the following:    Height as of this encounter: 1.626 m (5' 4\").    Weight as of this encounter: 98.4 kg (217 lb).            Disposition Plan      Expected Discharge Date: 07/23/2023                  Mark Landrum MD  Hospitalist Service  [facility]  Securely message with Fridge (more info)  Text page via [employer] Paging/Directory   ______________________________________________________________________    Interval History     No acute events overnight  BPs improved today  No CP/SOB  No fevers or chills  Main complaint is generalized fatigue and reports she didn't sleep well last night  No new complaints    Physical Exam   Vital Signs: Temp: 97.9  F " (36.6  C) Temp src: Oral BP: 121/80 Pulse: 74   Resp: 16 SpO2: 95 % O2 Device: None (Room air) Oxygen Delivery: 1 LPM  Weight: 217 lbs 0 oz     General Appearance:  Awake, alert, but fatigued appearing    Respiratory: lungs are clear anterior   Cardiovascular:  Bradycardia, regular   GI: soft abdomen, active bowel sounds   Skin: left knee incision - intact without draiange  Other:  Neuro - alert, oriented, moves all extremities equally, no drift, pupils are 3 mm and reactive  No facial droop         ----------------------------------------------------------------------------------------    Medical Decision Making       40 MINUTES SPENT BY ME on the date of service doing chart review, history, exam, documentation & further activities per the note.      Data     I have personally reviewed the following data over the past 24 hrs:    6.5  \   9.7 (L); 9.7 (L)   / 160     N/A N/A N/A /  137 (H)   N/A N/A N/A \       Trop: 11 BNP: 336       TSH: N/A T4: N/A A1C: N/A       Imaging results reviewed over the past 24 hrs:   Recent Results (from the past 24 hour(s))   Echocardiogram Complete   Result Value    LVEF  >70%    Narrative    [id number]  [id number]  [id number]  [id number]^MALATHI^ROLANDO     [facility]  Echocardiography Laboratory  [address] [place] [address]     Name: CYNTHIA PERRY  MRN: [id number]  : 1945  Study Date: 2023 07:46 AM  Age: 78 yrs  Gender: Female  Patient Location: [facility]  Reason For Study: Stress Syncope and Collapse  Ordering Physician: ROLANDO SERVIN  Performed By: Candelario Sorensen     BSA: 2.0 m2  Height: 64 in  Weight: 217 lb  HR: 84  BP: 114/46 mmHg  ______________________________________________________________________________  Procedure  Complete Portable Echo Adult. Optison (NDC #[id number]) given intravenously.  ______________________________________________________________________________  Interpretation Summary     A cardiac structural cause for syncope  was not identified  Hyperdynamic left ventricular function  The visual ejection fraction is >70%.  The left ventricle is normal in size.  Right ventricular systolic pressure is elevated, consistent with moderate  pulmonary hypertension.  The right ventricle is normal in structure, function and size.  The left atrium is moderately dilated.  Sinus rhythm was noted.     Doppler interrogation does not demonstrate significant stenosis or  insufficiency involving cardiac valves.     No old studies for comparison  ______________________________________________________________________________  Left Ventricle  The left ventricle is normal in size. There is normal left ventricular wall  thickness. Hyperdynamic left ventricular function. The visual ejection  fraction is >70%. No regional wall motion abnormalities noted. There is no  thrombus seen in the left ventricle.     Right Ventricle  The right ventricle is normal in structure, function and size. There is no  mass or thrombus in the right ventricle.     Atria  The left atrium is moderately dilated. Right atrial size is normal. There is  no atrial shunt seen. The left atrial appendage is not well visualized.     Mitral Valve  The mitral valve leaflets appear normal. There is no evidence of stenosis,  fluttering, or prolapse. There is no mitral regurgitation noted. There is no  mitral valve stenosis.     Tricuspid Valve  Normal tricuspid valve. There is mild (1+) tricuspid regurgitation. The right  ventricular systolic pressure is elevated at 54.0 mmHg. Right ventricular  systolic pressure is elevated, consistent with moderate pulmonary  hypertension.     Aortic Valve  The aortic valve is trileaflet. No aortic regurgitation is present. No aortic  stenosis is present.     Pulmonic Valve  Normal pulmonic valve. There is no pulmonic valvular regurgitation. There is  no pulmonic valvular stenosis.     Vessels  The aortic root is normal size. Normal size ascending aorta. The  inferior vena  cava is normal. The pulmonary artery is normal size.     Pericardium  The pericardium appears normal. There is no pleural effusion.     Rhythm  Sinus rhythm was noted.  ______________________________________________________________________________  MMode/2D Measurements & Calculations     IVSd: 0.97 cm  LVIDd: 5.0 cm  LVIDs: 3.8 cm  LVPWd: 1.2 cm  FS: 23.9 %  LV mass(C)d: 205.2 grams  LV mass(C)dI: 101.3 grams/m2  Ao root diam: 3.2 cm  LA dimension: 3.4 cm  asc Aorta Diam: 3.2 cm  LA/Ao: 1.1  LVOT diam: 2.0 cm  LVOT area: 3.3 cm2  LA Volume (BP): 93.4 ml  LA Volume Index (BP): 46.0 ml/m2  RWT: 0.49     TAPSE: 2.7 cm     Doppler Measurements & Calculations  MV E max jayson: 113.0 cm/sec  MV A max jayson: 118.6 cm/sec  MV E/A: 0.95  MV dec slope: 466.9 cm/sec2  MV dec time: 0.24 sec  PA acc time: 0.09 sec  TR max jayson: 367.4 cm/sec  TR max P.0 mmHg  E/E' avg: 15.9  Lateral E/e': 15.3  Medial E/e': 16.5  RV S Jayson: 22.1 cm/sec     ______________________________________________________________________________  Report approved by: Dr. Mario Eugene 2023 10:53 AM           Most Recent 3 CBC's:  Recent Labs   Lab Test 23  0645 23  0043 23  1611 23  0910   WBC 6.5  --   --  10.8   HGB 9.7*  9.7* 10.0* 9.6* 11.2*  11.2*   MCV 91  --   --  93     --   --  251     Most Recent 3 BMP's:  Recent Labs   Lab Test 23  0645 23  0932 23  0910 23  0610   NA  --   --  140  --    POTASSIUM  --   --  4.2  --    CHLORIDE  --   --  106  --    CO2  --   --  23  --    BUN  --   --  18.8  --    CR  --   --  0.91 0.67   ANIONGAP  --   --  11  --    YULISSA  --   --  8.1*  --    * 125* 138*  138*  --      Most Recent 2 LFT's:No lab results found.  Most Recent 3 INR's:No lab results found.  Most Recent 6 Bacteria Isolates From Any Culture (See EPIC Reports for Culture Details):No lab results found.  Most Recent Urinalysis:  Recent Labs   Lab Test 23  1889    COLOR Yellow   APPEARANCE Clear   URINEGLC Negative   URINEBILI Negative   URINEKETONE Negative   SG 1.021   UBLD Negative   URINEPH 5.5   PROTEIN 10*   NITRITE Negative   LEUKEST Negative   RBCU <1   WBCU 5

## 2023-07-23 NOTE — PROGRESS NOTES
Date/Time 7/22-7/23 0002-2111 AM  Diagnosis: L  total knee arthoplast   POD#:2  Mental Status: A/Ox4  Activity/dangle:A1-2/GB/walker  Diet: regular  Pain: Scheduled Tylenol, and PRN Oxy  Hernández/Voiding: cont B/B  Tele/Restraints/Iso: Tele on NSR  Skin: adema on Tom LE  02/LDA: VSS on RA, PIV  LR infusing 75 hr  D/C Date: TBD

## 2023-07-23 NOTE — PROGRESS NOTES
Orthopedic Surgery  Liliane PENA Sergyt  2023  Admit Date:  2023  POD 2 Days Post-Op  S/P Procedure(s):  left total knee arthroplasty    No acute events overnight. Continues to endorse some dizziness. Moving very slowing again today. Working with OT, PT today.  voicing concerns about discharging home instead of to TCU.       Vital Sign Ranges  Temp: 97.9  F (36.6  C) Temp  Min: 97.7  F (36.5  C)  Max: 98.7  F (37.1  C)  Resp: 16 Resp  Min: 15  Max: 19  SpO2: 95 % SpO2  Min: 90 %  Max: 100 %  Pulse: 74 Pulse  Min: 63  Max: 93    No data recorded  BP: 121/80 Systolic (24hrs), Av , Min:88 , Max:124   Diastolic (24hrs), Av, Min:35, Max:80      Left knee edema wear is clean, dry, and intact. Minimal erythema of the surrounding skin.  Bilateral calves are soft, non-tender.  left lower extremity is NVI.    Labs:  Results for orders placed or performed during the hospital encounter of 23 (from the past 24 hour(s))   Hemoglobin   Result Value Ref Range    Hemoglobin 9.6 (L) 11.7 - 15.7 g/dL   Troponin T, High Sensitivity   Result Value Ref Range    Troponin T, High Sensitivity 11 <=14 ng/L   Nt probnp inpatient   Result Value Ref Range    N terminal Pro BNP Inpatient 336 0 - 1,800 pg/mL   Ionized Calcium   Result Value Ref Range    Calcium Ionized 3.9 (L) 4.4 - 5.2 mg/dL   UA Macroscopic with reflex to Microscopic and Culture    Specimen: Urine, Clean Catch   Result Value Ref Range    Color Urine Yellow Colorless, Straw, Light Yellow, Yellow    Appearance Urine Clear Clear    Glucose Urine Negative Negative mg/dL    Bilirubin Urine Negative Negative    Ketones Urine Negative Negative mg/dL    Specific Gravity Urine 1.021 1.003 - 1.035    Blood Urine Negative Negative    pH Urine 5.5 5.0 - 7.0    Protein Albumin Urine 10 (A) Negative mg/dL    Urobilinogen Urine Normal Normal, 2.0 mg/dL    Nitrite Urine Negative Negative    Leukocyte Esterase Urine Negative Negative    Mucus Urine Present (A) None  Seen /LPF    RBC Urine <1 <=2 /HPF    WBC Urine 5 <=5 /HPF    Squamous Epithelials Urine 6 (H) <=1 /HPF    Hyaline Casts Urine 6 (H) <=2 /LPF    Narrative    Urine Culture not indicated   Hemoglobin   Result Value Ref Range    Hemoglobin 10.0 (L) 11.7 - 15.7 g/dL   Hemoglobin   Result Value Ref Range    Hemoglobin 9.7 (L) 11.7 - 15.7 g/dL   Glucose   Result Value Ref Range    Glucose 137 (H) 70 - 99 mg/dL   Echocardiogram Complete   Result Value Ref Range    LVEF  >70%     Narrative    [id number]  [facility]  [id number]  [id number]^MALATHI^ROLANDO     [facility]  Echocardiography Laboratory  [address] [place] [address]     Name: CYNTHIA PERRY  MRN: [id number]  : 1945  Study Date: 2023 07:46 AM  Age: 78 yrs  Gender: Female  Patient Location: [facility]  Reason For Study: Stress Syncope and Collapse  Ordering Physician: ROLANDO SERVIN  Performed By: Candelario Sorensen     BSA: 2.0 m2  Height: 64 in  Weight: 217 lb  HR: 84  BP: 114/46 mmHg  ______________________________________________________________________________  Procedure  Complete Portable Echo Adult. Optison (NDC #[id number]) given intravenously.  ______________________________________________________________________________  Interpretation Summary     A cardiac structural cause for syncope was not identified  Hyperdynamic left ventricular function  The visual ejection fraction is >70%.  The left ventricle is normal in size.  Right ventricular systolic pressure is elevated, consistent with moderate  pulmonary hypertension.  The right ventricle is normal in structure, function and size.  The left atrium is moderately dilated.  Sinus rhythm was noted.     Doppler interrogation does not demonstrate significant stenosis or  insufficiency involving cardiac valves.     No old studies for comparison  ______________________________________________________________________________  Left Ventricle  The left ventricle is normal in size. There  is normal left ventricular wall  thickness. Hyperdynamic left ventricular function. The visual ejection  fraction is >70%. No regional wall motion abnormalities noted. There is no  thrombus seen in the left ventricle.     Right Ventricle  The right ventricle is normal in structure, function and size. There is no  mass or thrombus in the right ventricle.     Atria  The left atrium is moderately dilated. Right atrial size is normal. There is  no atrial shunt seen. The left atrial appendage is not well visualized.     Mitral Valve  The mitral valve leaflets appear normal. There is no evidence of stenosis,  fluttering, or prolapse. There is no mitral regurgitation noted. There is no  mitral valve stenosis.     Tricuspid Valve  Normal tricuspid valve. There is mild (1+) tricuspid regurgitation. The right  ventricular systolic pressure is elevated at 54.0 mmHg. Right ventricular  systolic pressure is elevated, consistent with moderate pulmonary  hypertension.     Aortic Valve  The aortic valve is trileaflet. No aortic regurgitation is present. No aortic  stenosis is present.     Pulmonic Valve  Normal pulmonic valve. There is no pulmonic valvular regurgitation. There is  no pulmonic valvular stenosis.     Vessels  The aortic root is normal size. Normal size ascending aorta. The inferior vena  cava is normal. The pulmonary artery is normal size.     Pericardium  The pericardium appears normal. There is no pleural effusion.     Rhythm  Sinus rhythm was noted.  ______________________________________________________________________________  MMode/2D Measurements & Calculations     IVSd: 0.97 cm  LVIDd: 5.0 cm  LVIDs: 3.8 cm  LVPWd: 1.2 cm  FS: 23.9 %  LV mass(C)d: 205.2 grams  LV mass(C)dI: 101.3 grams/m2  Ao root diam: 3.2 cm  LA dimension: 3.4 cm  asc Aorta Diam: 3.2 cm  LA/Ao: 1.1  LVOT diam: 2.0 cm  LVOT area: 3.3 cm2  LA Volume (BP): 93.4 ml  LA Volume Index (BP): 46.0 ml/m2  RWT: 0.49     TAPSE: 2.7 cm     Doppler  Measurements & Calculations  MV E max jayson: 113.0 cm/sec  MV A max jayson: 118.6 cm/sec  MV E/A: 0.95  MV dec slope: 466.9 cm/sec2  MV dec time: 0.24 sec  PA acc time: 0.09 sec  TR max jayson: 367.4 cm/sec  TR max P.0 mmHg  E/E' avg: 15.9  Lateral E/e': 15.3  Medial E/e': 16.5  RV S Jayson: 22.1 cm/sec     ______________________________________________________________________________  Report approved by: Dr. Mario Eugene 2023 10:53 AM               A/P  1. S/p left TKA   Continue aspirin 325 mg every day  for DVT prophylaxis.     Mobilize with PT/OT WBAT.   Continue pain management as needed.      - Will decrease oxycodone dosing from 5-10 mg to 2.5-5 mg as patient having sedating side effects. Will add ibuprofen prn.     2. Disposition   Anticipate d/c pending medical stability tomorrow      Meghan Garduno PA-C   [facility]

## 2023-07-24 ENCOUNTER — APPOINTMENT (OUTPATIENT)
Dept: PHYSICAL THERAPY | Facility: CLINIC | Age: 78
DRG: 470 | End: 2023-07-24
Attending: ORTHOPAEDIC SURGERY
Payer: COMMERCIAL

## 2023-07-24 ENCOUNTER — APPOINTMENT (OUTPATIENT)
Dept: OCCUPATIONAL THERAPY | Facility: CLINIC | Age: 78
DRG: 470 | End: 2023-07-24
Attending: ORTHOPAEDIC SURGERY
Payer: COMMERCIAL

## 2023-07-24 PROCEDURE — 99232 SBSQ HOSP IP/OBS MODERATE 35: CPT | Performed by: STUDENT IN AN ORGANIZED HEALTH CARE EDUCATION/TRAINING PROGRAM

## 2023-07-24 PROCEDURE — 250N000013 HC RX MED GY IP 250 OP 250 PS 637: Performed by: PHYSICIAN ASSISTANT

## 2023-07-24 PROCEDURE — 97535 SELF CARE MNGMENT TRAINING: CPT | Mod: GO

## 2023-07-24 PROCEDURE — 97530 THERAPEUTIC ACTIVITIES: CPT | Mod: GP | Performed by: PHYSICAL THERAPY ASSISTANT

## 2023-07-24 PROCEDURE — 120N000001 HC R&B MED SURG/OB

## 2023-07-24 PROCEDURE — 97116 GAIT TRAINING THERAPY: CPT | Mod: GP | Performed by: PHYSICAL THERAPY ASSISTANT

## 2023-07-24 PROCEDURE — 97110 THERAPEUTIC EXERCISES: CPT | Mod: GP | Performed by: PHYSICAL THERAPY ASSISTANT

## 2023-07-24 PROCEDURE — 250N000013 HC RX MED GY IP 250 OP 250 PS 637: Performed by: ORTHOPAEDIC SURGERY

## 2023-07-24 RX ORDER — OXYCODONE HYDROCHLORIDE 5 MG/1
2.5 TABLET ORAL EVERY 4 HOURS PRN
Qty: 30 TABLET | Refills: 0 | Status: SHIPPED | OUTPATIENT
Start: 2023-07-24 | End: 2023-07-25

## 2023-07-24 RX ORDER — IBUPROFEN 600 MG/1
600 TABLET, FILM COATED ORAL EVERY 6 HOURS PRN
Qty: 30 TABLET | Refills: 0 | Status: SHIPPED | OUTPATIENT
Start: 2023-07-24

## 2023-07-24 RX ADMIN — Medication 950 MG: at 16:13

## 2023-07-24 RX ADMIN — AMITRIPTYLINE HYDROCHLORIDE 25 MG: 25 TABLET, FILM COATED ORAL at 21:56

## 2023-07-24 RX ADMIN — LEVOTHYROXINE SODIUM 125 MCG: 75 TABLET ORAL at 06:34

## 2023-07-24 RX ADMIN — ACETAMINOPHEN 650 MG: 325 TABLET, FILM COATED ORAL at 13:29

## 2023-07-24 RX ADMIN — CHOLECALCIFEROL TAB 10 MCG (400 UNIT) 10 MCG: 10 TAB at 20:37

## 2023-07-24 RX ADMIN — METHOCARBAMOL 500 MG: 500 TABLET ORAL at 13:29

## 2023-07-24 RX ADMIN — SENNOSIDES AND DOCUSATE SODIUM 1 TABLET: 8.6; 5 TABLET ORAL at 08:21

## 2023-07-24 RX ADMIN — Medication 950 MG: at 10:27

## 2023-07-24 RX ADMIN — ATORVASTATIN CALCIUM 10 MG: 10 TABLET, FILM COATED ORAL at 20:37

## 2023-07-24 RX ADMIN — ASPIRIN 325 MG: 325 TABLET, DELAYED RELEASE ORAL at 08:21

## 2023-07-24 RX ADMIN — POLYETHYLENE GLYCOL 3350 17 G: 17 POWDER, FOR SOLUTION ORAL at 08:21

## 2023-07-24 RX ADMIN — ACETAMINOPHEN 975 MG: 325 TABLET, FILM COATED ORAL at 06:34

## 2023-07-24 RX ADMIN — Medication 950 MG: at 21:55

## 2023-07-24 ASSESSMENT — ACTIVITIES OF DAILY LIVING (ADL)
ADLS_ACUITY_SCORE: 22

## 2023-07-24 NOTE — PLAN OF CARE
Trauma/Ortho/Medical (Choose one) Ortho    Diagnosis:Left total knee arthroplasty  POD#:3  Mental Status:A&O x4  Activity/dangle Assist of 1 with gait belt and walker  Diet: Reg  Pain: tylenol and robaxin  Hernández/Voiding: adequately in bathrooom  Tele/Restraints/Iso:Tele-NSR  02/LDA:  D/C Date:TBD  Other Info: Edema wear on CDI. CMS intact.

## 2023-07-24 NOTE — PROGRESS NOTES
Orthopedic Surgery  Liliane Romo  2023  Admit Date:  2023  POD 3 Days Post-Op  S/P Procedure(s):  left total knee arthroplasty    Patient is doing well.  Pain controlled.  Tolerating oral intake.  No events overnight. Has been slow to progress with therapy and was having some dizziness.  Denies dizziness today.  We discussed discharge disposition, TCU vs HC.  They are open to home care.    Alert and orient to person, place, and time.  Vital Sign Ranges  Temperature Temp  Av.2  F (36.8  C)  Min: 98  F (36.7  C)  Max: 98.4  F (36.9  C)   Blood pressure Systolic (24hrs), Av , Min:108 , Max:160        Diastolic (24hrs), Av, Min:47, Max:84      Pulse Pulse  Av.4  Min: 74  Max: 104   Respirations Resp  Av  Min: 16  Max: 18   Pulse oximetry SpO2  Av.2 %  Min: 94 %  Max: 99 %       Left knee edema wear is clean, dry, and intact. Minimal erythema of the surrounding skin.  Bilateral calves are soft, non-tender.  left lower extremity is NVI.    Labs:  Recent Labs   Lab Test 23  0910   POTASSIUM 4.2     Recent Labs   Lab Test 23  0645 23  0043 23  1611   HGB 9.7*  9.7* 10.0* 9.6*     No results for input(s): INR in the last 56400 hours.  Recent Labs   Lab Test 23  0645 23  0910    251       A/P  1. S/p left TKA   Continue ASA for DVT prophylaxis.     Mobilize with PT/OT WBAT.     Continue current pain regiment.  Tylenol, ibuprofen and oxy 2.5 mg if needed    2. Disposition   Anticipate d/c to home with home care today if passes PT   Care coordinator consulted, appreciate assistance    Priyanka Alejandro PA-C

## 2023-07-24 NOTE — PROGRESS NOTES
[facility]    Medicine Progress Note - Hospitalist Service    Date of Admission:  7/21/2023  Date of Service: 07/24/2023    Assessment & Plan      Liliane Romo is a 78 year old female admitted on 7/21/2023. She had left TKA on 7/21 per DR Fowler .  Surgical notes reviewed, uneventful case.  She was up with therapy day of surgery     7/22/2023 - she was up to bathroom, then sat up in chair.  Took 5 mg of oxycodone.  RN was notified by PT that she was not able to be woke up.  Noted to have a few works sternal rub.  BP low (80/40)  Assisted back to bed.     RRT called - see separate note by this writer.  Interventions included neuro exam (nonfocal), EEG (no acute, but sinus jose david), labs (hemoglobin was 11 down from preop of 13). Trop neg.  NS bolus given 500 ml and she improved her BP for a few hours after.  She was able, denies chest pain or dyspnea.    She has had poor PO intake since surgery - not drinking much fluid, but is able too.     She did not receive her HS dose of atenolol on 7/21     Assessment & Plan  1.  Hypotension  Assessment: etiology unclear.  Her baseline BP appears to be in 100-120.  Has not received any antihypertensives.  No severe anemia, was taking PO.  Not on chronic steroids.  Does not appear septic. Fluid boluses were given - suspect hypovolemia/dehydration with NPO status with surgery and poor po intake post op.   - ECG = sinus jose david, no acute ischemic findings  - no prior echos or heart testing in EMR   Plan:    - ECHO -> Hyperdynamic left ventricular function The visual ejection fraction is >70%.  The left ventricle is normal in size. Right ventricular systolic pressure is elevated, consistent with moderate pulmonary hypertension. The right ventricle is normal in structure, function and size. The left atrium is moderately dilated. Sinus rhythm was noted.  - Troponin wnl  - TSH wnl  - Hgb stable today 9.7  - Ionized Ca low -> s/p 1g Calcium gluconate     2.   "Bradycardia - present during hypotension.  - did not have BB in past 24 hours, no other rate control meds  - HR did improve after fluids.    - Telemetry can be stopped as bradycardia has resolved     3. Post op - left TKA  - uneventful surgery  - PT is ordered, but due to hypotension; not able to fully participate on 7/22/2023  - ASA for DVT ppx      4.  Hypothyroidism -   PTA diagnosis, followed by DR Oscar with endocrinology  Was due to decrease her synthroid dose to 112 mcg, but has not done so.     Continue synthroid at 125 unless TSH low (checking TSH) - then reduce to 112 mcg as per endocrines outpt recs      5. Pain control - currently has adequate pain control        Diet: Discharge Instruction - Regular Diet Adult  Advance Diet as Tolerated: Regular Diet Adult    DVT Prophylaxis: Pneumatic Compression Devices  Hernández Catheter: Not present  Lines: None     Cardiac Monitoring: None  Code Status: Full Code      Clinically Significant Risk Factors          # Hypocalcemia: Lowest iCa = 3.9 mg/dL in last 2 days, will monitor and replace as appropriate                # Obesity: Estimated body mass index is 37.25 kg/m  as calculated from the following:    Height as of this encounter: 1.626 m (5' 4\").    Weight as of this encounter: 98.4 kg (217 lb)., PRESENT ON ADMISSION          Disposition Plan      Expected Discharge Date: 07/24/2023                  Mark Landrum MD  Hospitalist Service  [facility]  Securely message with [employer] (more info)  Text page via [facility] Paging/Directory   ______________________________________________________________________    Interval History     No acute events overnight  BPs stable  No CP/SOB  No fevers or chills  Overall feels improved, pain controlled  No new complaints    Physical Exam   Vital Signs: Temp: 98.7  F (37.1  C) Temp src: Oral BP: (!) 150/73 Pulse: 83   Resp: 18 SpO2: 96 % O2 Device: None (Room air)    Weight: 217 lbs 0 oz     General " Appearance:  Awake, alert,   Respiratory: lungs are clear anterior   Cardiovascular:  RRR with S1 and S2 no m / r / g, regular   GI: soft abdomen, active bowel sounds   Skin: left knee incision - intact without draiange  Other:  Neuro - alert, oriented, moves all extremities equally, no drift, pupils are 3 mm and reactive  No facial droop         ----------------------------------------------------------------------------------------    Medical Decision Making       40 MINUTES SPENT BY ME on the date of service doing chart review, history, exam, documentation & further activities per the note.      Data         Imaging results reviewed over the past 24 hrs:   No results found for this or any previous visit (from the past 24 hour(s)).    Most Recent 3 CBC's:  Recent Labs   Lab Test 07/23/23  0645 07/23/23  0043 07/22/23  1611 07/22/23  0910   WBC 6.5  --   --  10.8   HGB 9.7*  9.7* 10.0* 9.6* 11.2*  11.2*   MCV 91  --   --  93     --   --  251     Most Recent 3 BMP's:  Recent Labs   Lab Test 07/23/23  0645 07/22/23  0932 07/22/23  0910 07/21/23  0610   NA  --   --  140  --    POTASSIUM  --   --  4.2  --    CHLORIDE  --   --  106  --    CO2  --   --  23  --    BUN  --   --  18.8  --    CR  --   --  0.91 0.67   ANIONGAP  --   --  11  --    YULISSA  --   --  8.1*  --    * 125* 138*  138*  --      Most Recent 2 LFT's:No lab results found.  Most Recent 3 INR's:No lab results found.  Most Recent 6 Bacteria Isolates From Any Culture (See EPIC Reports for Culture Details):No lab results found.  Most Recent Urinalysis:  Recent Labs   Lab Test 07/22/23  1833   COLOR Yellow   APPEARANCE Clear   URINEGLC Negative   URINEBILI Negative   URINEKETONE Negative   SG 1.021   UBLD Negative   URINEPH 5.5   PROTEIN 10*   NITRITE Negative   LEUKEST Negative   RBCU <1   WBCU 5

## 2023-07-24 NOTE — PROVIDER NOTIFICATION
MD Notification    Notified Person: MD    Notified Person Name:  Landrum    Notification Date/Time: 07/24/32, 1:46 PM    Notification Interaction: Naz    Purpose of Notification: Message: Patient is on continuous lactated ringers infusion, she is tolerating oral intake. Does she still need the infusion running? Call back      Orders Received: Waiting    Comments:

## 2023-07-24 NOTE — PLAN OF CARE
Diagnosis:L TKA  POD#:3  Mental Status:AxOx4  Activity/dangle: Ax1 GB and walker   Diet:Regular   Pain:Denies   Hernández/Voiding:Up to bathroom  Tele:CINDY  02/LDA:S/L  D/C Date:TCU tomorrow

## 2023-07-24 NOTE — CONSULTS
Care Management Initial Consult    General Information  Assessment completed with: Patient, Spouse or significant other, Liliane and  Micky  Type of CM/SW Visit: CM Role Introduction    Primary Care Provider verified and updated as needed: Yes   Readmission within the last 30 days:        Reason for Consult: discharge planning  Advance Care Planning:            Communication Assessment  Patient's communication style: spoken language (English or Bilingual)    Hearing Difficulty or Deaf: no   Wear Glasses or Blind: no    Cognitive  Cognitive/Neuro/Behavioral: WDL  Level of Consciousness: lethargic (drowsy post pain medication)     Orientation: oriented x 4             Living Environment:   People in home: spouse  Micky  Current living Arrangements: house      Able to return to prior arrangements: yes       Family/Social Support:  Care provided by: self, spouse/significant other  Provides care for: no one  Marital Status:     Micky       Description of Support System: Supportive, Involved         Current Resources:   Patient receiving home care services:       Community Resources:    Equipment currently used at home: none  Supplies currently used at home:      Employment/Financial:  Employment Status: retired        Financial Concerns:             Does the patient's insurance plan have a 3 day qualifying hospital stay waiver?  No    Lifestyle & Psychosocial Needs:  Social Determinants of Health     Tobacco Use: Low Risk  (7/24/2023)    Patient History     Smoking Tobacco Use: Never     Smokeless Tobacco Use: Never     Passive Exposure: Not on file   Alcohol Use: Not on file   Financial Resource Strain: Not on file   Food Insecurity: Not on file   Transportation Needs: Not on file   Physical Activity: Not on file   Stress: Not on file   Social Connections: Not on file   Intimate Partner Violence: Not on file   Depression: Not on file   Housing Stability: Not on file       Functional  Status:  Prior to admission patient needed assistance:              Mental Health Status:          Chemical Dependency Status:                Values/Beliefs:  Spiritual, Cultural Beliefs, [employer] Practices, Values that affect care:                 Additional Information:  Consult for discharge planning and need for home care at discharge. Writer met with patient and  at bedside. Patient admitted for a left knee DJD. She lives in a house with 16 stairs within. Writer discussed discharge plan and they explained that they have some concerns with patient's ability to do the stairs as their bedroom is on the 2nd floor but if the provider would prefer that patient discharge home with home care they will follow what is recommended. Referral sent for home care.    If patient is recommended for TCU, Liliane stated that she has already spoken with [facility] and would like a referral sent there. Referral sent to see if they have openings.      Call from [facility] that if patient is cleared for TCU, they will have a bed available tomorrow. $20/day private room fee. Writer spoke to patient and  and they are aware of the private room fee. Call placed to Priyanka Alejandro to update and find out if patient will discharge home or approved for TCU.     1330: Writer informed that patient is approved to discharge to TCU tomorrow. Writer updated patient and . Call to [facility] to confirm bed. [facility] [employer] faxed for prior auth. Room ready between [id number] on 7/25/23.  plans to transport.    Writer will follow for discharge planning.    MIESHA Durham

## 2023-07-25 ENCOUNTER — APPOINTMENT (OUTPATIENT)
Dept: PHYSICAL THERAPY | Facility: CLINIC | Age: 78
DRG: 470 | End: 2023-07-25
Attending: ORTHOPAEDIC SURGERY
Payer: COMMERCIAL

## 2023-07-25 VITALS
BODY MASS INDEX: 37.05 KG/M2 | RESPIRATION RATE: 16 BRPM | SYSTOLIC BLOOD PRESSURE: 153 MMHG | WEIGHT: 217 LBS | HEART RATE: 99 BPM | OXYGEN SATURATION: 97 % | HEIGHT: 64 IN | TEMPERATURE: 98.1 F | DIASTOLIC BLOOD PRESSURE: 79 MMHG

## 2023-07-25 LAB
ANION GAP SERPL CALCULATED.3IONS-SCNC: 16 MMOL/L (ref 7–15)
ATRIAL RATE - MUSE: 54 BPM
BUN SERPL-MCNC: 9.7 MG/DL (ref 8–23)
CALCIUM SERPL-MCNC: 8.7 MG/DL (ref 8.8–10.2)
CHLORIDE SERPL-SCNC: 104 MMOL/L (ref 98–107)
CREAT SERPL-MCNC: 0.54 MG/DL (ref 0.51–0.95)
DEPRECATED HCO3 PLAS-SCNC: 23 MMOL/L (ref 22–29)
DIASTOLIC BLOOD PRESSURE - MUSE: NORMAL MMHG
ERYTHROCYTE [DISTWIDTH] IN BLOOD BY AUTOMATED COUNT: 13.1 % (ref 10–15)
GFR SERPL CREATININE-BSD FRML MDRD: >90 ML/MIN/1.73M2
GLUCOSE SERPL-MCNC: 146 MG/DL (ref 70–99)
HCT VFR BLD AUTO: 36.1 % (ref 35–47)
HGB BLD-MCNC: 12 G/DL (ref 11.7–15.7)
INTERPRETATION ECG - MUSE: NORMAL
MCH RBC QN AUTO: 29.9 PG (ref 26.5–33)
MCHC RBC AUTO-ENTMCNC: 33.2 G/DL (ref 31.5–36.5)
MCV RBC AUTO: 90 FL (ref 78–100)
P AXIS - MUSE: 51 DEGREES
PLATELET # BLD AUTO: 231 10E3/UL (ref 150–450)
POTASSIUM SERPL-SCNC: 3.8 MMOL/L (ref 3.4–5.3)
PR INTERVAL - MUSE: 158 MS
QRS DURATION - MUSE: 92 MS
QT - MUSE: 472 MS
QTC - MUSE: 447 MS
R AXIS - MUSE: 36 DEGREES
RBC # BLD AUTO: 4.02 10E6/UL (ref 3.8–5.2)
SODIUM SERPL-SCNC: 143 MMOL/L (ref 136–145)
SYSTOLIC BLOOD PRESSURE - MUSE: NORMAL MMHG
T AXIS - MUSE: 35 DEGREES
VENTRICULAR RATE- MUSE: 54 BPM
WBC # BLD AUTO: 7 10E3/UL (ref 4–11)

## 2023-07-25 PROCEDURE — 80048 BASIC METABOLIC PNL TOTAL CA: CPT | Performed by: STUDENT IN AN ORGANIZED HEALTH CARE EDUCATION/TRAINING PROGRAM

## 2023-07-25 PROCEDURE — 36415 COLL VENOUS BLD VENIPUNCTURE: CPT | Performed by: STUDENT IN AN ORGANIZED HEALTH CARE EDUCATION/TRAINING PROGRAM

## 2023-07-25 PROCEDURE — 250N000013 HC RX MED GY IP 250 OP 250 PS 637: Performed by: ORTHOPAEDIC SURGERY

## 2023-07-25 PROCEDURE — 97530 THERAPEUTIC ACTIVITIES: CPT | Mod: GP | Performed by: PHYSICAL THERAPY ASSISTANT

## 2023-07-25 PROCEDURE — 85027 COMPLETE CBC AUTOMATED: CPT | Performed by: STUDENT IN AN ORGANIZED HEALTH CARE EDUCATION/TRAINING PROGRAM

## 2023-07-25 PROCEDURE — 97110 THERAPEUTIC EXERCISES: CPT | Mod: GP | Performed by: PHYSICAL THERAPY ASSISTANT

## 2023-07-25 PROCEDURE — 97116 GAIT TRAINING THERAPY: CPT | Mod: GP | Performed by: PHYSICAL THERAPY ASSISTANT

## 2023-07-25 PROCEDURE — 250N000013 HC RX MED GY IP 250 OP 250 PS 637: Performed by: PHYSICIAN ASSISTANT

## 2023-07-25 RX ORDER — OXYCODONE HYDROCHLORIDE 5 MG/1
2.5 TABLET ORAL EVERY 4 HOURS PRN
Qty: 30 TABLET | Refills: 0 | Status: SHIPPED | OUTPATIENT
Start: 2023-07-25

## 2023-07-25 RX ADMIN — ASPIRIN 325 MG: 325 TABLET, DELAYED RELEASE ORAL at 08:14

## 2023-07-25 RX ADMIN — POLYETHYLENE GLYCOL 3350 17 G: 17 POWDER, FOR SOLUTION ORAL at 08:13

## 2023-07-25 RX ADMIN — Medication 950 MG: at 08:13

## 2023-07-25 RX ADMIN — ACETAMINOPHEN 650 MG: 325 TABLET, FILM COATED ORAL at 13:33

## 2023-07-25 RX ADMIN — LEVOTHYROXINE SODIUM 125 MCG: 75 TABLET ORAL at 08:13

## 2023-07-25 RX ADMIN — SENNOSIDES AND DOCUSATE SODIUM 1 TABLET: 8.6; 5 TABLET ORAL at 08:14

## 2023-07-25 ASSESSMENT — ACTIVITIES OF DAILY LIVING (ADL)
ADLS_ACUITY_SCORE: 20
ADLS_ACUITY_SCORE: 22
ADLS_ACUITY_SCORE: 22
ADLS_ACUITY_SCORE: 20
ADLS_ACUITY_SCORE: 20
ADLS_ACUITY_SCORE: 22
ADLS_ACUITY_SCORE: 22

## 2023-07-25 NOTE — PROGRESS NOTES
Patient is A&O x4. Vss on RA. Assist of 1 with gait belt and walker. Pain managed with tylenol. IV access removed. Incision site open to air, no drainage noted. Edema wear in place. CMS intact. Discharging to [facility] TCU. Spouse is transporting patient.

## 2023-07-25 NOTE — PROGRESS NOTES
Care Management Discharge Note    Discharge Date: 07/25/2023       Discharge Disposition: Transitional Care    Discharge Services: None    Discharge DME: None    Discharge Transportation: car, drives self, family or friend will provide    Private pay costs discussed: Not applicable    Does the patient's insurance plan have a 3 day qualifying hospital stay waiver?  Yes   Will the waiver be used for post-acute placement? Yes    PAS Confirmation Code: [id number]  Patient/family educated on Medicare website which has current facility and service quality ratings: yes    Education Provided on the Discharge Plan:    Persons Notified of Discharge Plans: Patient/spouse  Patient/Family in Agreement with the Plan: yes    Handoff Referral Completed: No    Additional Information:  JOHANA received [employer] auth [id number] 07/25-07/31. Sw updated [facility] who can accept anytime before 5. SW updated patient/spuse and patient in agreement and would like spouse to transport. Spouse plans to transport to [facility] at 1430. SW compelted PAS and faxed/added to chart. SW received discharge orders/paper scripts and faxed/added to chart.    PAS-RR    D: Per DHS regulation, SW completed and submitted PAS-RR to [place] [employer]ging Direct Connect via the Senior [employer] Line.  PAS-RR confirmation # is : [id number]    I: SW spoke with patient and they are aware a PAS-RR has been submitted.  SW reviewed with patient that they may be contacted for a follow up appointment within 10 days of hospital discharge if their SNF stay is < 30 days.  Contact information for Senior [employer] Line was also provided.    A: patient verbalized understanding.    P: Further questions may be directed to Senior LinkAge Line at #[phone], option #4 for PAS-RR staff.      Calros Reyes, MIESHA    [facility]

## 2023-07-25 NOTE — PLAN OF CARE
Goal Outcome Evaluation:    Trauma/Ortho/Medical (Choose one): Ortho     Diagnosis: Left TKA  POD# 4  Mental Status: A/O x4  Activity/dangle: 1 GB / walker  Diet: regular  Pain: Denied  Hernández/Voiding:  BR  Tele/Restraints/Iso: NA  02/LDA: PIV sl  D/C Date: possible today 7/25/2023  Other Info : incision open to air. Edema wear in place.

## 2023-07-25 NOTE — PROGRESS NOTES
Orthopedic Surgery  Liliane PENA Marinaquyt  2023  Admit Date:  2023  POD 4 Days Post-Op  S/P Procedure(s):  left total knee arthroplasty    Patient is doing well.  Pain controlled.  Tolerating oral intake.  No events overnight. Planning to discharge to [facility] today.  Discharge instructions reviewed.    Alert and orient to person, place, and time.  Vital Sign Ranges  Temperature Temp  Av.5  F (36.9  C)  Min: 98.1  F (36.7  C)  Max: 98.7  F (37.1  C)   Blood pressure Systolic (24hrs), Av , Min:150 , Max:159        Diastolic (24hrs), Av, Min:70, Max:79      Pulse Pulse  Av.3  Min: 83  Max: 99   Respirations Resp  Av.7  Min: 16  Max: 18   Pulse oximetry SpO2  Av %  Min: 95 %  Max: 97 %       Left knee edema wear is clean, dry, and intact. Minimal erythema of the surrounding skin.  Bilateral calves are soft, non-tender.  left lower extremity is NVI.    Labs:  Recent Labs   Lab Test 23  0829 23  0910   POTASSIUM 3.8 4.2     Recent Labs   Lab Test 23  0829 23  0645 23  0043   HGB 12.0 9.7*  9.7* 10.0*     No results for input(s): INR in the last 46161 hours.  Recent Labs   Lab Test 23  0829 23  0645 23  0910    160 251       A/P    1. S/p left TKA              Continue ASA for DVT prophylaxis.                Mobilize with PT/OT WBAT.                Continue current pain regiment.  Tylenol, ibuprofen and oxy 2.5 mg if needed, printed and signed for TCU    2. Disposition   Anticipate d/c to [facility] today.    Priyanka Alejandro PA-C

## 2023-07-25 NOTE — PLAN OF CARE
Occupational Therapy Discharge Summary    Reason for therapy discharge:    Discharged to transitional care facility.    Progress towards therapy goal(s). See goals on Care Plan in [facility] electronic health record for goal details.  Goals partially met.  Barriers to achieving goals:   discharge from facility.    Therapy recommendation(s):    Recommend continued skilled OT at TCU to progress independence with I/ADLs.

## 2023-07-26 NOTE — PLAN OF CARE
Physical Therapy Discharge Summary    Reason for therapy discharge:    Discharged to transitional care facility.    Progress towards therapy goal(s). See goals on Care Plan in [facility] electronic health record for goal details.  Goals not met.  Barriers to achieving goals:   discharge from facility.    Therapy recommendation(s):    Continued therapy is recommended.  Rationale/Recommendations:  Patient would benefit from continue skilled PT to progress all functional mobility.    PT-  Pt discharged to TCU today.  PT goals not met.

## 2023-08-03 ENCOUNTER — DOCUMENTATION ONLY (OUTPATIENT)
Dept: OTHER | Facility: CLINIC | Age: 78
End: 2023-08-03
Payer: COMMERCIAL

## 2023-08-10 NOTE — DISCHARGE SUMMARY
"Discharge Summary    Liliane Romo MRN# [id number]   YOB: 1945 Age: 78 year old     Date of Admission: 7/21/2023    Date of Discharge: 7/25/2023    Reason for Admission: Liliane Romo is an 78 year old female who was admitted to the hospital following surgery with Dr. Coy Burton.    Preoperative Diagnosis: Left knee DJD [M17.12]    Postoperative Diagnosis: Left knee DJD [M17.12]    Procedure Completed: Procedure(s):  left total knee arthroplasty     Hospital Course:  Ms. Romo was admitted and underwent the above procedure. The patient tolerated the procedure well. There were no complications. Following surgery she was admitted to the floor.  During her stay she did not require any blood transfusions.  Her last hemoglobin was noted to be   Lab Results   Component Value Date    HGB 12.0 07/25/2023   .  Her pain was controlled with oral pain medication.  Through her progression in physical therapy, it was determined that she would best be served with a stay at a transitional care unit.  Social work arranged for this.    Discharge Physical Exam:  BP (!) 153/79 (BP Location: Right arm)   Pulse 99   Temp 98.1  F (36.7  C) (Oral)   Resp 16   Ht 1.626 m (5' 4\")   Wt 98.4 kg (217 lb)   SpO2 97%   BMI 37.25 kg/m    Neurovascularly intact, distal pulses present bilaterally.  Calves are negative bilaterally, both soft and nontender.  The would looks good with minimal erythema of the surrounding skin.    Assessment: Ms. Romo is stable and doing well status post Procedure(s):  left total knee arthroplasty.    Plan: We will discharge her to a transitional care unit ([facility]) on analgesics and deep venous thrombosis prophylaxis.  She will follow-up with me approximately 3 weeks from surgery.     Meds:     Medication List        Started      aspirin 325 MG EC tablet  Commonly known as: ASA  325 mg, Oral, DAILY     ibuprofen 600 MG tablet  Commonly known as: ADVIL/MOTRIN  600 mg, Oral, EVERY 6 HOURS " PRN     oxyCODONE 5 MG tablet  Commonly known as: ROXICODONE  2.5 mg, Oral, EVERY 4 HOURS PRN     senna-docusate 8.6-50 MG tablet  Commonly known as: SENOKOT-S/PERICOLACE  1-2 tablets, Oral, 2 TIMES DAILY, Take while on oral narcotics to prevent or treat constipation.            Modified      acetaminophen 325 MG tablet  Commonly known as: TYLENOL  975 mg, Oral, EVERY 6 HOURS PRN  What changed:   medication strength  how much to take

## 2025-02-28 ENCOUNTER — MEDICAL CORRESPONDENCE (OUTPATIENT)
Dept: HEALTH INFORMATION MANAGEMENT | Facility: CLINIC | Age: 80
End: 2025-02-28

## 2025-03-28 RX ORDER — OMEPRAZOLE 20 MG/1
20 CAPSULE, DELAYED RELEASE ORAL DAILY
Status: ON HOLD | COMMUNITY

## 2025-03-28 NOTE — PROGRESS NOTES
PTA medications updated by Medication Scribe prior to surgery via phone call with patient (last doses completed by Nurse)     Medication history sources: Patient, H&P  In the past week, patient estimated taking medication this percent of the time: Greater than 90%      Significant changes made to the medication list:  Patient reports no longer taking the following meds (med scribe removed from PTA med list): oxycodone      Additional medication history information:   None    Medication reconciliation completed by provider prior to medication history? No    Time spent in this activity: 40 minutes    The information provided in this note is only as accurate as the sources available at the time of update(s)      Prior to Admission medications    Medication Sig Last Dose Taking? Auth Provider Long Term End Date   acetaminophen (TYLENOL) 325 MG tablet Take 3 tablets (975 mg) by mouth every 6 hours as needed for mild pain  Yes Coy Fowler MD     amitriptyline (ELAVIL) 25 MG tablet Take 25 mg by mouth at bedtime. Bedtime Yes Reported, Patient Yes    atenolol (TENORMIN) 25 MG tablet Take 25 mg by mouth every evening. Evening Yes Reported, Patient Yes    atorvastatin (LIPITOR) 20 MG tablet Take 0.5 tablets by mouth daily (0.5 x 20 mg = 10 mg) Evening Yes Reported, Patient Yes    calcium citrate (CITRACAL) 950 (200 Ca) MG tablet Take 1 tablet by mouth 3 times daily 3/26/2025 Evening Yes Reported, Patient     cholecalciferol (VITAMIN D3) 10 mcg (400 units) TABS tablet Take 1 tablet by mouth 3 times daily. 3/26/2025 Evening Yes Reported, Patient     ibuprofen (ADVIL/MOTRIN) 600 MG tablet Take 1 tablet (600 mg) by mouth every 6 hours as needed for inflammatory pain 3/26/2025 Yes Priyanka Alejandro PA-C     levothyroxine (SYNTHROID/LEVOTHROID) 112 MCG tablet Take 1 tablet by mouth every morning (before breakfast). Brand Synthroid Morning Yes Reported, Patient Yes    omeprazole (PRILOSEC) 20 MG DR capsule Take 20 mg by mouth  daily. Morning Yes Reported, Patient     senna-docusate (SENOKOT-S/PERICOLACE) 8.6-50 MG tablet Take 1-2 tablets by mouth 2 times daily Take while on oral narcotics to prevent or treat constipation. Unknown Yes Coy Fowler MD         Medication history completed by: Angy Jaquez LPN

## 2025-04-02 ENCOUNTER — ANESTHESIA EVENT (OUTPATIENT)
Dept: SURGERY | Facility: CLINIC | Age: 80
End: 2025-04-02
Payer: COMMERCIAL

## 2025-04-02 NOTE — ANESTHESIA PREPROCEDURE EVALUATION
Anesthesia Pre-Procedure Evaluation    Patient: Liliane Romo   MRN: [id number] : 1945        Procedure : Procedure(s):  RIGHT TOTAL KNEE ARTHROPLASTY          Past Medical History:   Diagnosis Date    Arthritis     BPPV (benign paroxysmal positional vertigo)     Complex tear of medial meniscus of right knee     De Quervain's tenosynovitis, left     Diverticulosis     DJD (degenerative joint disease) of knee, bilateral     Esophageal stricture     Dilation x 2    Goiter     History of hormone replacement therapy     HLD (hyperlipidemia)     Hypertension     Hypoparathyroidism     Malignant neoplasm of thyroid gland (H)     Migraine     Obese     Plantar neuroma of right foot     Postablative hypothyroidism     Thyroid disease     Tibial plateau fracture, left     Tremor of right hand       Past Surgical History:   Procedure Laterality Date    ARTHROPLASTY KNEE Left 2023    Procedure: left total knee arthroplasty;  Surgeon: Coy Fowler MD;  Location: [facility] OR    CATARACT REMOVAL WITH IMPLANT  Bilateral     CERVICAL POLYPECTOMY  2012    [facility] SURGICAL PATHOLOGY Left     LEFT THYROID LOBECTOMY AND ISTHMECTOMYAND ABLATION    ESOPHAGEAL DILATATION       X2    GYN SURGERY      tubal ligation    IR ESOPHAGEAL STENTING      x2    THYROIDECTOMY   2008    THYROIDECTOMY, PARTIAL  2008    TUBAL LIGATION      WISDOM TEETH EXTRACTION        No Known Allergies   Social History     Tobacco Use    Smoking status: Never    Smokeless tobacco: Never   Substance Use Topics    Alcohol use: Not Currently     Comment: 1 glass wine per week      Wt Readings from Last 1 Encounters:   23 98.4 kg (217 lb)        Anesthesia Evaluation            ROS/MED HX  ENT/Pulmonary:    (-) sleep apnea   Neurologic: Comment: BPPV, right hand tremor    (+)      migraines,                          Cardiovascular:     (+)  hypertension- -   -  - -                                pulmonary hypertension, Previous cardiac  testing   Echo: Date: 07/2023 Results:  A cardiac structural cause for syncope was not identified  Hyperdynamic left ventricular function  The visual ejection fraction is >70%.  The left ventricle is normal in size.  Right ventricular systolic pressure is elevated, consistent with moderate  pulmonary hypertension.  The right ventricle is normal in structure, function and size.  The left atrium is moderately dilated.  Sinus rhythm was noted.     Doppler interrogation does not demonstrate significant stenosis or  insufficiency involving cardiac valves.     No old studies for comparison    Stress Test:  Date: Results:    ECG Reviewed:  Date: Results:    Cath:  Date: Results:   (-) dyslipidemia   METS/Exercise Tolerance: 3 - Able to walk 1-2 blocks without stopping    Hematologic:       Musculoskeletal:   (+)  arthritis,             GI/Hepatic:     (+)   esophageal disease, Stricture (w/ dilation x2),                Renal/Genitourinary:    (-) renal disease   Endo: Comment: hypoparathyroid    (+)          thyroid problem, hypothyroidism,    Obesity,       Psychiatric/Substance Use:       Infectious Disease:       Malignancy:   (+) Malignancy, History of Other.Other CA hx thyroid cancer status post.    Other:            Physical Exam    Airway        Mallampati: III   TM distance: > 3 FB   Neck ROM: full   Mouth opening: > 3 cm    Respiratory Devices and Support         Dental       (+) Modest Abnormalities - crowns, retainers, 1 or 2 missing teeth      Cardiovascular   cardiovascular exam normal          Pulmonary   pulmonary exam normal                OUTSIDE LABS:  CBC:   Lab Results   Component Value Date    WBC 7.0 07/25/2023    WBC 6.5 07/23/2023    HGB 12.0 07/25/2023    HGB 9.7 (L) 07/23/2023    HGB 9.7 (L) 07/23/2023    HCT 36.1 07/25/2023    HCT 29.4 (L) 07/23/2023     07/25/2023     07/23/2023     BMP:   Lab Results   Component Value Date     07/25/2023     07/22/2023    POTASSIUM 3.8  "07/25/2023    POTASSIUM 4.2 07/22/2023    CHLORIDE 104 07/25/2023    CHLORIDE 106 07/22/2023    CO2 23 07/25/2023    CO2 23 07/22/2023    BUN 9.7 07/25/2023    BUN 18.8 07/22/2023    CR 0.54 07/25/2023    CR 0.91 07/22/2023     (H) 07/25/2023     (H) 07/23/2023     COAGS: No results found for: \"PTT\", \"INR\", \"FIBR\"  POC: No results found for: \"BGM\", \"HCG\", \"HCGS\"  HEPATIC: No results found for: \"ALBUMIN\", \"PROTTOTAL\", \"ALT\", \"AST\", \"GGT\", \"ALKPHOS\", \"BILITOTAL\", \"BILIDIRECT\", \"MEL\"  OTHER:   Lab Results   Component Value Date    YULISSA 8.7 (L) 07/25/2023    TSH 0.43 07/22/2023       Anesthesia Plan    ASA Status:  3    NPO Status:  NPO Appropriate    Anesthesia Type: General.     - Airway: ETT   Induction: Propofol.   Maintenance: Balanced.        Consents    Anesthesia Plan(s) and associated risks, benefits, and realistic alternatives discussed. Questions answered and patient/representative(s) expressed understanding.     - Discussed:     - Discussed with:  Patient            Postoperative Care    Pain management: IV analgesics, Oral pain medications, Peripheral nerve block (Single Shot), Multi-modal analgesia.   PONV prophylaxis: Ondansetron (or other 5HT-3), Dexamethasone or Solumedrol, Background Propofol Infusion     Comments:    Other Comments: Pulmonary hypertension precautions            Biju Pope MD    Clinically Significant Risk Factors Present on Admission                                          "

## 2025-04-03 ENCOUNTER — APPOINTMENT (OUTPATIENT)
Dept: PHYSICAL THERAPY | Facility: CLINIC | Age: 80
DRG: 469 | End: 2025-04-03
Attending: ORTHOPAEDIC SURGERY
Payer: COMMERCIAL

## 2025-04-03 ENCOUNTER — ANESTHESIA (OUTPATIENT)
Dept: SURGERY | Facility: CLINIC | Age: 80
End: 2025-04-03
Payer: COMMERCIAL

## 2025-04-03 ENCOUNTER — HOSPITAL ENCOUNTER (OUTPATIENT)
Facility: CLINIC | Age: 80
End: 2025-04-03
Attending: ORTHOPAEDIC SURGERY | Admitting: ORTHOPAEDIC SURGERY
Payer: COMMERCIAL

## 2025-04-03 ENCOUNTER — APPOINTMENT (OUTPATIENT)
Dept: GENERAL RADIOLOGY | Facility: CLINIC | Age: 80
DRG: 469 | End: 2025-04-03
Attending: ORTHOPAEDIC SURGERY
Payer: COMMERCIAL

## 2025-04-03 DIAGNOSIS — Z96.651 STATUS POST RIGHT KNEE REPLACEMENT: Primary | ICD-10-CM

## 2025-04-03 DIAGNOSIS — Z96.652 STATUS POST TOTAL LEFT KNEE REPLACEMENT: ICD-10-CM

## 2025-04-03 LAB
CREAT SERPL-MCNC: 0.73 MG/DL (ref 0.51–0.95)
EGFRCR SERPLBLD CKD-EPI 2021: 83 ML/MIN/1.73M2
FASTING STATUS PATIENT QL REPORTED: YES
GLUCOSE SERPL-MCNC: 114 MG/DL (ref 70–99)
POTASSIUM SERPL-SCNC: 3.6 MMOL/L (ref 3.4–5.3)

## 2025-04-03 PROCEDURE — C1776 JOINT DEVICE (IMPLANTABLE): HCPCS | Performed by: ORTHOPAEDIC SURGERY

## 2025-04-03 PROCEDURE — 250N000009 HC RX 250: Performed by: ORTHOPAEDIC SURGERY

## 2025-04-03 PROCEDURE — 710N000009 HC RECOVERY PHASE 1, LEVEL 1, PER MIN: Performed by: ORTHOPAEDIC SURGERY

## 2025-04-03 PROCEDURE — 250N000011 HC RX IP 250 OP 636: Mod: JZ | Performed by: ORTHOPAEDIC SURGERY

## 2025-04-03 PROCEDURE — 250N000025 HC SEVOFLURANE, PER MIN: Performed by: ORTHOPAEDIC SURGERY

## 2025-04-03 PROCEDURE — 82947 ASSAY GLUCOSE BLOOD QUANT: CPT | Performed by: STUDENT IN AN ORGANIZED HEALTH CARE EDUCATION/TRAINING PROGRAM

## 2025-04-03 PROCEDURE — 97530 THERAPEUTIC ACTIVITIES: CPT | Mod: GP

## 2025-04-03 PROCEDURE — 250N000013 HC RX MED GY IP 250 OP 250 PS 637: Performed by: INTERNAL MEDICINE

## 2025-04-03 PROCEDURE — 258N000001 HC RX 258: Performed by: ORTHOPAEDIC SURGERY

## 2025-04-03 PROCEDURE — 258N000003 HC RX IP 258 OP 636: Performed by: STUDENT IN AN ORGANIZED HEALTH CARE EDUCATION/TRAINING PROGRAM

## 2025-04-03 PROCEDURE — 0SRC0J9 REPLACEMENT OF RIGHT KNEE JOINT WITH SYNTHETIC SUBSTITUTE, CEMENTED, OPEN APPROACH: ICD-10-PCS | Performed by: ORTHOPAEDIC SURGERY

## 2025-04-03 PROCEDURE — 258N000003 HC RX IP 258 OP 636: Performed by: ORTHOPAEDIC SURGERY

## 2025-04-03 PROCEDURE — 36415 COLL VENOUS BLD VENIPUNCTURE: CPT | Performed by: STUDENT IN AN ORGANIZED HEALTH CARE EDUCATION/TRAINING PROGRAM

## 2025-04-03 PROCEDURE — 999N000065 XR KNEE PORT RIGHT 1/2 VIEWS: Mod: RT

## 2025-04-03 PROCEDURE — 99232 SBSQ HOSP IP/OBS MODERATE 35: CPT | Performed by: INTERNAL MEDICINE

## 2025-04-03 PROCEDURE — 97116 GAIT TRAINING THERAPY: CPT | Mod: GP

## 2025-04-03 PROCEDURE — 370N000017 HC ANESTHESIA TECHNICAL FEE, PER MIN: Performed by: ORTHOPAEDIC SURGERY

## 2025-04-03 PROCEDURE — 272N000001 HC OR GENERAL SUPPLY STERILE: Performed by: ORTHOPAEDIC SURGERY

## 2025-04-03 PROCEDURE — 250N000011 HC RX IP 250 OP 636: Performed by: ORTHOPAEDIC SURGERY

## 2025-04-03 PROCEDURE — C1713 ANCHOR/SCREW BN/BN,TIS/BN: HCPCS | Performed by: ORTHOPAEDIC SURGERY

## 2025-04-03 PROCEDURE — 360N000077 HC SURGERY LEVEL 4, PER MIN: Performed by: ORTHOPAEDIC SURGERY

## 2025-04-03 PROCEDURE — 250N000009 HC RX 250: Performed by: NURSE ANESTHETIST, CERTIFIED REGISTERED

## 2025-04-03 PROCEDURE — 84132 ASSAY OF SERUM POTASSIUM: CPT | Performed by: STUDENT IN AN ORGANIZED HEALTH CARE EDUCATION/TRAINING PROGRAM

## 2025-04-03 PROCEDURE — 97161 PT EVAL LOW COMPLEX 20 MIN: CPT | Mod: GP

## 2025-04-03 PROCEDURE — 250N000013 HC RX MED GY IP 250 OP 250 PS 637: Performed by: ORTHOPAEDIC SURGERY

## 2025-04-03 PROCEDURE — 82565 ASSAY OF CREATININE: CPT | Performed by: ORTHOPAEDIC SURGERY

## 2025-04-03 PROCEDURE — 999N000141 HC STATISTIC PRE-PROCEDURE NURSING ASSESSMENT: Performed by: ORTHOPAEDIC SURGERY

## 2025-04-03 PROCEDURE — 258N000003 HC RX IP 258 OP 636: Performed by: NURSE ANESTHETIST, CERTIFIED REGISTERED

## 2025-04-03 PROCEDURE — 250N000011 HC RX IP 250 OP 636: Performed by: NURSE ANESTHETIST, CERTIFIED REGISTERED

## 2025-04-03 DEVICE — IMP BONE CEMENT STRK SIMPLEX HV FULL DOSE 6194-1-001: Type: IMPLANTABLE DEVICE | Site: KNEE | Status: FUNCTIONAL

## 2025-04-03 DEVICE — ATTUNE KNEE SYSTEM FEMORAL CRUCIATE RETAINING SIZE 4 RIGHT CEMENTED
Type: IMPLANTABLE DEVICE | Site: KNEE | Status: FUNCTIONAL
Brand: ATTUNE

## 2025-04-03 DEVICE — ATTUNE KNEE SYSTEM TIBIAL INSERT FIXED BEARING MEDIAL STABILIZED RIGHT AOX 4, 12MM
Type: IMPLANTABLE DEVICE | Site: KNEE | Status: FUNCTIONAL
Brand: ATTUNE

## 2025-04-03 DEVICE — ATTUNE KNEE SYSTEM TIBIAL BASE FIXED BEARING SIZE 3 CEMENTED
Type: IMPLANTABLE DEVICE | Site: KNEE | Status: FUNCTIONAL
Brand: ATTUNE

## 2025-04-03 DEVICE — ATTUNE PATELLA MEDIALIZED DOME 35MM CEMENTED AOX
Type: IMPLANTABLE DEVICE | Site: KNEE | Status: FUNCTIONAL
Brand: ATTUNE

## 2025-04-03 RX ORDER — DEXAMETHASONE SODIUM PHOSPHATE 4 MG/ML
INJECTION, SOLUTION INTRA-ARTICULAR; INTRALESIONAL; INTRAMUSCULAR; INTRAVENOUS; SOFT TISSUE PRN
Status: DISCONTINUED | OUTPATIENT
Start: 2025-04-03 | End: 2025-04-03

## 2025-04-03 RX ORDER — KETOROLAC TROMETHAMINE 15 MG/ML
15 INJECTION, SOLUTION INTRAMUSCULAR; INTRAVENOUS EVERY 6 HOURS
Status: DISPENSED | OUTPATIENT
Start: 2025-04-03 | End: 2025-04-04

## 2025-04-03 RX ORDER — CALCIUM CARBONATE 500 MG/1
500 TABLET, CHEWABLE ORAL 4 TIMES DAILY PRN
Status: ACTIVE | OUTPATIENT
Start: 2025-04-03

## 2025-04-03 RX ORDER — MELOXICAM 15 MG/1
15 TABLET ORAL DAILY
Qty: 42 TABLET | Refills: 0 | Status: SHIPPED | OUTPATIENT
Start: 2025-04-03 | End: 2025-05-15

## 2025-04-03 RX ORDER — LIDOCAINE 40 MG/G
CREAM TOPICAL
Status: ACTIVE | OUTPATIENT
Start: 2025-04-03

## 2025-04-03 RX ORDER — LEVOTHYROXINE SODIUM 112 UG/1
112 TABLET ORAL
Status: ACTIVE | OUTPATIENT
Start: 2025-04-04

## 2025-04-03 RX ORDER — KETOROLAC TROMETHAMINE 10 MG/1
10 TABLET, FILM COATED ORAL EVERY 6 HOURS
Qty: 6 TABLET | Refills: 0 | Status: SHIPPED | OUTPATIENT
Start: 2025-04-03 | End: 2025-04-05

## 2025-04-03 RX ORDER — ATENOLOL 25 MG/1
25 TABLET ORAL EVERY EVENING
Status: DISPENSED | OUTPATIENT
Start: 2025-04-03

## 2025-04-03 RX ORDER — EPHEDRINE SULFATE 50 MG/ML
INJECTION, SOLUTION INTRAMUSCULAR; INTRAVENOUS; SUBCUTANEOUS PRN
Status: DISCONTINUED | OUTPATIENT
Start: 2025-04-03 | End: 2025-04-03

## 2025-04-03 RX ORDER — HYDROMORPHONE HCL IN WATER/PF 6 MG/30 ML
0.2 PATIENT CONTROLLED ANALGESIA SYRINGE INTRAVENOUS EVERY 5 MIN PRN
Status: DISCONTINUED | OUTPATIENT
Start: 2025-04-03 | End: 2025-04-03 | Stop reason: HOSPADM

## 2025-04-03 RX ORDER — ONDANSETRON 4 MG/1
4 TABLET, ORALLY DISINTEGRATING ORAL EVERY 30 MIN PRN
Status: DISCONTINUED | OUTPATIENT
Start: 2025-04-03 | End: 2025-04-03 | Stop reason: HOSPADM

## 2025-04-03 RX ORDER — HYDROMORPHONE HCL IN WATER/PF 6 MG/30 ML
0.4 PATIENT CONTROLLED ANALGESIA SYRINGE INTRAVENOUS EVERY 5 MIN PRN
Status: DISCONTINUED | OUTPATIENT
Start: 2025-04-03 | End: 2025-04-03 | Stop reason: HOSPADM

## 2025-04-03 RX ORDER — PANTOPRAZOLE SODIUM 40 MG/1
40 TABLET, DELAYED RELEASE ORAL
Status: ACTIVE | OUTPATIENT
Start: 2025-04-04

## 2025-04-03 RX ORDER — DIPHENHYDRAMINE HCL 12.5MG/5ML
12.5 LIQUID (ML) ORAL EVERY 6 HOURS PRN
Status: ACTIVE | OUTPATIENT
Start: 2025-04-03

## 2025-04-03 RX ORDER — NALOXONE HYDROCHLORIDE 0.4 MG/ML
0.1 INJECTION, SOLUTION INTRAMUSCULAR; INTRAVENOUS; SUBCUTANEOUS
Status: DISCONTINUED | OUTPATIENT
Start: 2025-04-03 | End: 2025-04-03 | Stop reason: HOSPADM

## 2025-04-03 RX ORDER — HYDROXYZINE HYDROCHLORIDE 10 MG/1
10 TABLET, FILM COATED ORAL EVERY 6 HOURS PRN
Status: ACTIVE | OUTPATIENT
Start: 2025-04-03

## 2025-04-03 RX ORDER — AMOXICILLIN 250 MG
1-2 CAPSULE ORAL 2 TIMES DAILY
Qty: 30 TABLET | Refills: 0 | Status: SHIPPED | OUTPATIENT
Start: 2025-04-03

## 2025-04-03 RX ORDER — ASPIRIN 325 MG
325 TABLET, DELAYED RELEASE (ENTERIC COATED) ORAL DAILY
Qty: 42 TABLET | Refills: 0 | Status: SHIPPED | OUTPATIENT
Start: 2025-04-03 | End: 2025-05-15

## 2025-04-03 RX ORDER — SODIUM CHLORIDE, SODIUM LACTATE, POTASSIUM CHLORIDE, CALCIUM CHLORIDE 600; 310; 30; 20 MG/100ML; MG/100ML; MG/100ML; MG/100ML
INJECTION, SOLUTION INTRAVENOUS CONTINUOUS
Status: ACTIVE | OUTPATIENT
Start: 2025-04-03

## 2025-04-03 RX ORDER — NALOXONE HYDROCHLORIDE 0.4 MG/ML
0.2 INJECTION, SOLUTION INTRAMUSCULAR; INTRAVENOUS; SUBCUTANEOUS
Status: ACTIVE | OUTPATIENT
Start: 2025-04-03

## 2025-04-03 RX ORDER — POLYETHYLENE GLYCOL 3350 17 G/17G
17 POWDER, FOR SOLUTION ORAL DAILY
Status: ACTIVE | OUTPATIENT
Start: 2025-04-04

## 2025-04-03 RX ORDER — ONDANSETRON 4 MG/1
4 TABLET, ORALLY DISINTEGRATING ORAL EVERY 6 HOURS PRN
Status: ACTIVE | OUTPATIENT
Start: 2025-04-03

## 2025-04-03 RX ORDER — DEXAMETHASONE SODIUM PHOSPHATE 4 MG/ML
4 INJECTION, SOLUTION INTRA-ARTICULAR; INTRALESIONAL; INTRAMUSCULAR; INTRAVENOUS; SOFT TISSUE
Status: DISCONTINUED | OUTPATIENT
Start: 2025-04-03 | End: 2025-04-03 | Stop reason: HOSPADM

## 2025-04-03 RX ORDER — SODIUM CHLORIDE, SODIUM LACTATE, POTASSIUM CHLORIDE, CALCIUM CHLORIDE 600; 310; 30; 20 MG/100ML; MG/100ML; MG/100ML; MG/100ML
INJECTION, SOLUTION INTRAVENOUS CONTINUOUS
Status: DISCONTINUED | OUTPATIENT
Start: 2025-04-03 | End: 2025-04-03 | Stop reason: HOSPADM

## 2025-04-03 RX ORDER — ONDANSETRON 2 MG/ML
4 INJECTION INTRAMUSCULAR; INTRAVENOUS EVERY 30 MIN PRN
Status: DISCONTINUED | OUTPATIENT
Start: 2025-04-03 | End: 2025-04-03 | Stop reason: HOSPADM

## 2025-04-03 RX ORDER — AMOXICILLIN 250 MG
1 CAPSULE ORAL 2 TIMES DAILY
Status: DISPENSED | OUTPATIENT
Start: 2025-04-03

## 2025-04-03 RX ORDER — METHOCARBAMOL 500 MG/1
500 TABLET, FILM COATED ORAL EVERY 6 HOURS PRN
Status: DISPENSED | OUTPATIENT
Start: 2025-04-03

## 2025-04-03 RX ORDER — FENTANYL CITRATE 50 UG/ML
INJECTION, SOLUTION INTRAMUSCULAR; INTRAVENOUS PRN
Status: DISCONTINUED | OUTPATIENT
Start: 2025-04-03 | End: 2025-04-03

## 2025-04-03 RX ORDER — FENTANYL CITRATE 0.05 MG/ML
25 INJECTION, SOLUTION INTRAMUSCULAR; INTRAVENOUS EVERY 5 MIN PRN
Status: DISCONTINUED | OUTPATIENT
Start: 2025-04-03 | End: 2025-04-03 | Stop reason: HOSPADM

## 2025-04-03 RX ORDER — ATORVASTATIN CALCIUM 10 MG/1
10 TABLET, FILM COATED ORAL DAILY
Status: DISPENSED | OUTPATIENT
Start: 2025-04-03

## 2025-04-03 RX ORDER — OXYCODONE HYDROCHLORIDE 5 MG/1
5 TABLET ORAL EVERY 4 HOURS PRN
Status: DISPENSED | OUTPATIENT
Start: 2025-04-03

## 2025-04-03 RX ORDER — CEFAZOLIN SODIUM 2 G/50ML
2 SOLUTION INTRAVENOUS EVERY 8 HOURS
Status: COMPLETED | OUTPATIENT
Start: 2025-04-03 | End: 2025-04-04

## 2025-04-03 RX ORDER — CEFAZOLIN SODIUM/WATER 2 G/20 ML
2 SYRINGE (ML) INTRAVENOUS
Status: COMPLETED | OUTPATIENT
Start: 2025-04-03 | End: 2025-04-03

## 2025-04-03 RX ORDER — OXYCODONE HYDROCHLORIDE 5 MG/1
5 TABLET ORAL EVERY 6 HOURS PRN
Qty: 30 TABLET | Refills: 0 | Status: SHIPPED | OUTPATIENT
Start: 2025-04-03

## 2025-04-03 RX ORDER — FENTANYL CITRATE 0.05 MG/ML
50 INJECTION, SOLUTION INTRAMUSCULAR; INTRAVENOUS EVERY 5 MIN PRN
Status: DISCONTINUED | OUTPATIENT
Start: 2025-04-03 | End: 2025-04-03 | Stop reason: HOSPADM

## 2025-04-03 RX ORDER — PROCHLORPERAZINE MALEATE 5 MG/1
5 TABLET ORAL EVERY 6 HOURS PRN
Status: ACTIVE | OUTPATIENT
Start: 2025-04-03

## 2025-04-03 RX ORDER — PROPOFOL 10 MG/ML
INJECTION, EMULSION INTRAVENOUS PRN
Status: DISCONTINUED | OUTPATIENT
Start: 2025-04-03 | End: 2025-04-03

## 2025-04-03 RX ORDER — NALOXONE HYDROCHLORIDE 0.4 MG/ML
0.4 INJECTION, SOLUTION INTRAMUSCULAR; INTRAVENOUS; SUBCUTANEOUS
Status: ACTIVE | OUTPATIENT
Start: 2025-04-03

## 2025-04-03 RX ORDER — BISACODYL 10 MG
10 SUPPOSITORY, RECTAL RECTAL DAILY PRN
Status: ACTIVE | OUTPATIENT
Start: 2025-04-03

## 2025-04-03 RX ORDER — ASPIRIN 325 MG
325 TABLET, DELAYED RELEASE (ENTERIC COATED) ORAL DAILY
Status: ACTIVE | OUTPATIENT
Start: 2025-04-04

## 2025-04-03 RX ORDER — PREGABALIN 150 MG/1
150 CAPSULE ORAL ONCE
Status: COMPLETED | OUTPATIENT
Start: 2025-04-03 | End: 2025-04-03

## 2025-04-03 RX ORDER — HYDROMORPHONE HCL IN WATER/PF 6 MG/30 ML
0.2 PATIENT CONTROLLED ANALGESIA SYRINGE INTRAVENOUS EVERY 4 HOURS PRN
Status: ACTIVE | OUTPATIENT
Start: 2025-04-03

## 2025-04-03 RX ORDER — ACETAMINOPHEN 325 MG/1
975 TABLET ORAL EVERY 8 HOURS
Status: DISPENSED | OUTPATIENT
Start: 2025-04-03 | End: 2025-04-06

## 2025-04-03 RX ORDER — ONDANSETRON 2 MG/ML
4 INJECTION INTRAMUSCULAR; INTRAVENOUS EVERY 6 HOURS PRN
Status: ACTIVE | OUTPATIENT
Start: 2025-04-03

## 2025-04-03 RX ORDER — HYDROMORPHONE HCL IN WATER/PF 6 MG/30 ML
0.1 PATIENT CONTROLLED ANALGESIA SYRINGE INTRAVENOUS EVERY 4 HOURS PRN
Status: ACTIVE | OUTPATIENT
Start: 2025-04-03

## 2025-04-03 RX ORDER — ACETAMINOPHEN 325 MG/1
975 TABLET ORAL EVERY 6 HOURS PRN
Qty: 100 TABLET | Refills: 0 | Status: SHIPPED | OUTPATIENT
Start: 2025-04-03

## 2025-04-03 RX ORDER — GLYCOPYRROLATE 0.2 MG/ML
INJECTION, SOLUTION INTRAMUSCULAR; INTRAVENOUS PRN
Status: DISCONTINUED | OUTPATIENT
Start: 2025-04-03 | End: 2025-04-03

## 2025-04-03 RX ORDER — CEFAZOLIN SODIUM/WATER 2 G/20 ML
2 SYRINGE (ML) INTRAVENOUS SEE ADMIN INSTRUCTIONS
Status: DISCONTINUED | OUTPATIENT
Start: 2025-04-03 | End: 2025-04-03 | Stop reason: HOSPADM

## 2025-04-03 RX ORDER — ONDANSETRON 2 MG/ML
INJECTION INTRAMUSCULAR; INTRAVENOUS PRN
Status: DISCONTINUED | OUTPATIENT
Start: 2025-04-03 | End: 2025-04-03

## 2025-04-03 RX ORDER — TRANEXAMIC ACID 650 MG/1
1950 TABLET ORAL ONCE
Status: COMPLETED | OUTPATIENT
Start: 2025-04-03 | End: 2025-04-03

## 2025-04-03 RX ORDER — LIDOCAINE HYDROCHLORIDE 20 MG/ML
INJECTION, SOLUTION INFILTRATION; PERINEURAL PRN
Status: DISCONTINUED | OUTPATIENT
Start: 2025-04-03 | End: 2025-04-03

## 2025-04-03 RX ADMIN — KETOROLAC TROMETHAMINE 15 MG: 15 INJECTION, SOLUTION INTRAMUSCULAR; INTRAVENOUS at 14:04

## 2025-04-03 RX ADMIN — Medication 5 MG: at 08:09

## 2025-04-03 RX ADMIN — SODIUM CHLORIDE, POTASSIUM CHLORIDE, SODIUM LACTATE AND CALCIUM CHLORIDE: 600; 310; 30; 20 INJECTION, SOLUTION INTRAVENOUS at 16:25

## 2025-04-03 RX ADMIN — Medication 5 MG: at 07:55

## 2025-04-03 RX ADMIN — Medication 300 MG: at 10:16

## 2025-04-03 RX ADMIN — DEXAMETHASONE SODIUM PHOSPHATE 4 MG: 4 INJECTION, SOLUTION INTRA-ARTICULAR; INTRALESIONAL; INTRAMUSCULAR; INTRAVENOUS; SOFT TISSUE at 07:40

## 2025-04-03 RX ADMIN — KETOROLAC TROMETHAMINE 15 MG: 15 INJECTION, SOLUTION INTRAMUSCULAR; INTRAVENOUS at 20:13

## 2025-04-03 RX ADMIN — PROPOFOL 200 MG: 10 INJECTION, EMULSION INTRAVENOUS at 07:40

## 2025-04-03 RX ADMIN — AMITRIPTYLINE HYDROCHLORIDE 25 MG: 25 TABLET, FILM COATED ORAL at 21:52

## 2025-04-03 RX ADMIN — TRANEXAMIC ACID 1950 MG: 650 TABLET ORAL at 06:15

## 2025-04-03 RX ADMIN — GLYCOPYRROLATE 0.2 MG: 0.2 INJECTION, SOLUTION INTRAMUSCULAR; INTRAVENOUS at 08:04

## 2025-04-03 RX ADMIN — ONDANSETRON 4 MG: 2 INJECTION INTRAMUSCULAR; INTRAVENOUS at 09:55

## 2025-04-03 RX ADMIN — ROCURONIUM BROMIDE 50 MG: 50 INJECTION, SOLUTION INTRAVENOUS at 07:41

## 2025-04-03 RX ADMIN — CEFAZOLIN SODIUM 2 G: 2 SOLUTION INTRAVENOUS at 16:27

## 2025-04-03 RX ADMIN — Medication 2 G: at 07:41

## 2025-04-03 RX ADMIN — ROCURONIUM BROMIDE 20 MG: 50 INJECTION, SOLUTION INTRAVENOUS at 08:27

## 2025-04-03 RX ADMIN — ACETAMINOPHEN 975 MG: 325 TABLET ORAL at 13:53

## 2025-04-03 RX ADMIN — FENTANYL CITRATE 100 MCG: 50 INJECTION INTRAMUSCULAR; INTRAVENOUS at 07:40

## 2025-04-03 RX ADMIN — OXYCODONE HYDROCHLORIDE 5 MG: 5 TABLET ORAL at 20:12

## 2025-04-03 RX ADMIN — PREGABALIN 150 MG: 150 CAPSULE ORAL at 06:15

## 2025-04-03 RX ADMIN — ACETAMINOPHEN 975 MG: 325 TABLET ORAL at 20:12

## 2025-04-03 RX ADMIN — SODIUM CHLORIDE, SODIUM LACTATE, POTASSIUM CHLORIDE, AND CALCIUM CHLORIDE: .6; .31; .03; .02 INJECTION, SOLUTION INTRAVENOUS at 10:12

## 2025-04-03 RX ADMIN — PROPOFOL 15 MCG/KG/MIN: 10 INJECTION, EMULSION INTRAVENOUS at 07:49

## 2025-04-03 RX ADMIN — SODIUM CHLORIDE, SODIUM LACTATE, POTASSIUM CHLORIDE, AND CALCIUM CHLORIDE: .6; .31; .03; .02 INJECTION, SOLUTION INTRAVENOUS at 07:30

## 2025-04-03 RX ADMIN — ATORVASTATIN CALCIUM 10 MG: 10 TABLET, FILM COATED ORAL at 16:19

## 2025-04-03 RX ADMIN — PHENYLEPHRINE HYDROCHLORIDE 0.5 MCG/KG/MIN: 10 INJECTION INTRAVENOUS at 07:52

## 2025-04-03 RX ADMIN — LIDOCAINE HYDROCHLORIDE 100 MG: 20 INJECTION, SOLUTION INFILTRATION; PERINEURAL at 07:40

## 2025-04-03 RX ADMIN — ROCURONIUM BROMIDE 20 MG: 50 INJECTION, SOLUTION INTRAVENOUS at 09:15

## 2025-04-03 RX ADMIN — SENNOSIDES AND DOCUSATE SODIUM 1 TABLET: 50; 8.6 TABLET ORAL at 20:12

## 2025-04-03 RX ADMIN — ATENOLOL 25 MG: 25 TABLET ORAL at 20:12

## 2025-04-03 ASSESSMENT — ACTIVITIES OF DAILY LIVING (ADL)
ADLS_ACUITY_SCORE: 39
ADLS_ACUITY_SCORE: 36
ADLS_ACUITY_SCORE: 39
ADLS_ACUITY_SCORE: 36
ADLS_ACUITY_SCORE: 34
ADLS_ACUITY_SCORE: 36
ADLS_ACUITY_SCORE: 39
ADLS_ACUITY_SCORE: 39
ADLS_ACUITY_SCORE: 57
ADLS_ACUITY_SCORE: 39
ADLS_ACUITY_SCORE: 39
ADLS_ACUITY_SCORE: 36
ADLS_ACUITY_SCORE: 36
ADLS_ACUITY_SCORE: 39
ADLS_ACUITY_SCORE: 36

## 2025-04-03 NOTE — BRIEF OP NOTE
[facility]    Brief Operative Note    Pre-operative diagnosis: Right knee OA  Post-operative diagnosis same  Procedure: RIGHT TOTAL KNEE ARTHROPLASTY  Surgeon(s) and Role:     * Coy Fowler MD - Primary     * Evita Liu PA-C - Assisting    Anesthesia: general   Estimated blood loss: 100 ml  Drains:  None  Specimens: None  Findings:  Advanced OA  Complications: None    Plan: DC home POD1 w/family assist.  DVT prophylaxis w/ASA 325mg QD x6wks.     Implant Name Type Inv. Item Serial No.  Lot No. LRB No. Used Action   IMP BONE CEMENT STRK SIMPLEX HV FULL DOSE 6194-1-001 - [id number] Cement, Bone IMP BONE CEMENT STRK SIMPLEX HV FULL DOSE 6194-1-001  [employer] [id number] Right 2 Implanted   IMP PATELLA JJ ATTUNE DOME 35MM [id number] - [id number] Total Joint Component/Insert IMP PATELLA JJ ATTUNE DOME 35MM [id number]  [employer]- [id number] Right 1 Implanted   IMP TIB BASE JJ ATTUNE FX BR SYS TERESA SZ3 1506- - [id number] Total Joint Component/Insert IMP TIB BASE JJ ATTUNE FX BR SYS TERESA SZ3 1506-  [employer]- [id number] Right 1 Implanted   IMP COMP FEM JJ ATTUNE CR RT TERESA SZ4 [id number] - [id number] Total Joint Component/Insert IMP COMP FEM JJ ATTUNE CR RT TERESA SZ4 [id number]  [employer]- [id number] Right 1 Implanted   INSERT TIB 4 12MM KN RT CRCTE RTN MDL STAB ATTUNE [id number] - [id number] Total Joint Component/Insert INSERT TIB 4 12MM KN RT CRCTE RTN MDL STAB ATTUNE [id number]  [employer]- [id number] Right 1 Implanted

## 2025-04-03 NOTE — ANESTHESIA PROCEDURE NOTES
Adductor canal (targeting saphenous nerve) Procedure Note    Pre-Procedure   Staff -        Anesthesiologist:  Biju Pope MD       Performed By: anesthesiologist       Location: pre-op       Procedure Start/Stop Times: 4/3/2025 7:04 AM and 4/3/2025 7:06 AM       Pre-Anesthestic Checklist: patient identified, IV checked, site marked, risks and benefits discussed, informed consent, monitors and equipment checked, pre-op evaluation, at physician/surgeon's request and post-op pain management  Timeout:       Correct Patient: Yes        Correct Procedure: Yes        Correct Site: Yes        Correct Position: Yes        Correct Laterality: Yes        Site Marked: Yes  Procedure Documentation  Procedure: Adductor canal (targeting saphenous nerve)         Laterality: right       Patient Position: supine       Skin prep: Chloraprep       Local skin infiltrated with 1 mL of 1% lidocaine.        Needle Type: insulated       Needle Gauge: 21.        Needle Length (millimeters): 90        Ultrasound guided       1. Ultrasound was used to identify targeted nerve, plexus, vascular marker, or fascial plane and place a needle adjacent to it in real-time.       2. Ultrasound was used to visualize the spread of anesthetic in close proximity to the above referenced structure.       3. A permanent image is entered into the patient's record.       4. The visualized anatomic structures appeared normal.       5. There were no apparent abnormal pathologic findings.    Assessment/Narrative         The placement was negative for: blood aspirated, painful injection and site bleeding       Paresthesias: No.       Bolus given via needle..        Secured via.        Insertion/Infusion Method: Single Shot       Complications: none    Medication(s) Administered   Medication Administration Time: 4/3/2025 7:04 AM     Comments:  Bolus via needle, 15 mL of 0.5% bupivacaine with 1:400,000 epinephrine.    Under ultrasound guidance, a 21 gauge  "needle was inserted and placed in close proximity to the saphenous nerve. Ultrasound was also used to visualize the spread of anesthetic in close proximity to the nerve being blocked. The nerve appeared anatomically normal, and there were no apparent abnormal pathological findings. Patient tolerated well, was mildly sedated but communicative throughout the procedure. A permanent ultrasound image was saved in the patient's record.    The surgeon has given a verbal order transferring care of this patient to me for the performance of regional analgesia block for post op pain control. It is requested of me because I am uniquely trained and qualified to perform this block and the surgeon is neither trained nor qualified to perform this procedure.         FOR [facility] ([facility] [place], [place]) ONLY:   Pain Team Contact information: please page the Pain Team Via [employer]. Search \"Pain\". During daytime hours, please page the attending first. At night please page the resident first.      "

## 2025-04-03 NOTE — ANESTHESIA CARE TRANSFER NOTE
Patient: Liliane Romo    Procedure: Procedure(s):  RIGHT TOTAL KNEE ARTHROPLASTY       Diagnosis: Degenerative arthritis of right knee [M17.11]  Diagnosis Additional Information: No value filed.    Anesthesia Type:   General     Note:    Oropharynx: oropharynx clear of all foreign objects and spontaneously breathing  Level of Consciousness: awake  Oxygen Supplementation: face mask  Level of Supplemental Oxygen (L/min / FiO2): 6  Independent Airway: airway patency satisfactory and stable  Dentition: dentition unchanged  Vital Signs Stable: post-procedure vital signs reviewed and stable  Report to RN Given: handoff report given  Patient transferred to: PACU  Comments: Pt to PACU with O2 via mask, airway patent, VSS. Report to RN.  Handoff Report: Identifed the Patient, Identified the Reponsible Provider, Reviewed the pertinent medical history, Discussed the surgical course, Reviewed Intra-OP anesthesia mangement and issues during anesthesia, Set expectations for post-procedure period and Allowed opportunity for questions and acknowledgement of understanding  Vitals:  Vitals Value Taken Time   /72 04/03/25 1026   Temp 36.6  C (97.88  F) 04/03/25 1030   Pulse 67 04/03/25 1030   Resp 24 04/03/25 1030   SpO2 100 % 04/03/25 1030   Vitals shown include unfiled device data.    Electronically Signed By: ROCKY Bo CRNA  April 3, 2025  10:31 AM

## 2025-04-03 NOTE — OR NURSING
OK to transfer to rm 2311-1 per Dr Pope- report given to Cynthia DA SILVA- transported by Nurse Aide- bladder c an 213- voids light yellow on bedpan in PACU-in PACU

## 2025-04-03 NOTE — PROGRESS NOTES
"[facility] [place]    Medicine Progress Note - Hospitalist Service    Date of Admission:  4/3/2025    Assessment & Plan     Liliane Romo is an 79-year-old female with history of hyperlipidemia, hypothyroidism, migraine headaches, GERD, who underwent right total knee arthroplasty on 4/3/2023    Hospital medicine following for comanagement of medical conditions    S/p right total knee arthroplasty, Dr. Fowler, 4/3/2025 for advanced osteoarthritis  -Doing well postoperatively.  Surgical management per orthopedics  -Continue pain control.  PT.  Monitor hemoglobin  -DVT prophylaxis per orthopedic    Hyperlipidemia  -Continue atorvastatin 10 mg daily    Hypothyroidism  -Continue levothyroxine 125 mcg daily    GERD  -Continue PPI    Chronic migraine headaches  -Continue atenolol and amitriptyline.  Patient reports with atenolol and amitriptyline, her migraine headaches have been well-controlled with no current issues.    Moderate pulmonary hypertension on echo in 2023  -Echo 7/2023 was obtained when patient had presyncope after left knee arthroplasty.  Echo showed hyperdynamic LV function of 70%, elevated RV pressure consistent with pulmonary hypertension  -Follow-up with PCP          Diet: Advance Diet as Tolerated: Regular Diet Adult  Discharge Instruction - Regular Diet Adult    DVT Prophylaxis: Defer to primary service  Hernández Catheter: Not present  Lines: None     Cardiac Monitoring: None  Code Status: Full Code      Clinically Significant Risk Factors Present on Admission                             # Obesity: Estimated body mass index is 38.16 kg/m  as calculated from the following:    Height as of this encounter: 1.651 m (5' 5\").    Weight as of this encounter: 104 kg (229 lb 4.8 oz).              Social Drivers of Health            Disposition Plan         Medically Ready for Discharge: Anticipated Tomorrow             Laila Whitfield MD  Hospitalist Service  [facility] " Hospital  Securely message with [employer] (more info)  Text page via [facility] Paging/Directory   ______________________________________________________________________    Interval History     Underwent right total knee arthroplasty earlier today.  Doing well postoperatively.  Pain is controlled.  Denies dizziness, chest pain, shortness of breath, nausea    Physical Exam   Vital Signs: Temp: 98.2  F (36.8  C) Temp src: Oral BP: (!) 141/111 Pulse: 80   Resp: 20 SpO2: 94 % O2 Device: None (Room air) Oxygen Delivery: 2 LPM  Weight: 229 lbs 4.8 oz    Constitutional - awake and alert, resting in bed, in no acute distress  Cardiovascular - regular rate and rhythm, no murmurs, trace edema  Pulmonary - lungs are clear to auscultation bilaterally, no wheezing or rhonchi  GI - abdomen is soft, nontender, nondistended  Neurological - awake, alert and oriented x3.  Moving all 4 extremities, normal speech, no focal deficits    Medical Decision Making       45 MINUTES SPENT BY ME on the date of service doing chart review, history, exam, documentation & further activities per the note.      Data     I have personally reviewed the following data over the past 24 hrs:    N/A  \   N/A   / N/A     N/A N/A N/A /  114 (H)   3.6 N/A 0.73 \       Imaging results reviewed over the past 24 hrs:   No results found for this or any previous visit (from the past 24 hours).

## 2025-04-03 NOTE — ANESTHESIA PROCEDURE NOTES
Airway       Patient location during procedure: OR       Procedure Start/Stop Times: 4/3/2025 7:43 AM  Staff -        CRNA: Yaritza Erazo APRN CRNA       Performed By: CRNA  Consent for Airway        Urgency: elective  Indications and Patient Condition       Indications for airway management: kevin-procedural       Induction type:intravenous       Mask difficulty assessment: 2 - vent by mask + OA or adjuvant +/- NMBA    Final Airway Details       Final airway type: endotracheal airway       Successful airway: ETT - single  Endotracheal Airway Details        ETT size (mm): 7.0       Cuffed: yes       Successful intubation technique: video laryngoscopy       VL Blade Size: Glidescope 3       Grade View of Cords: 1       Adjucts: stylet       Position: Right       Measured from: lips       Secured at (cm): 22       Bite block used: None    Post intubation assessment        Placement verified by: capnometry, equal breath sounds and chest rise        Number of attempts at approach: 2       Secured with: tape       Ease of procedure: easy       Dentition: Intact and Unchanged    Medication(s) Administered   Medication Administration Time: 4/3/2025 7:43 AM

## 2025-04-03 NOTE — ANESTHESIA POSTPROCEDURE EVALUATION
Patient: Liliane Romo    Procedure: Procedure(s):  RIGHT TOTAL KNEE ARTHROPLASTY       Anesthesia Type:  General    Note:  Disposition: Inpatient   Postop Pain Control: Uneventful            Sign Out: Well controlled pain   PONV: No   Neuro/Psych: Uneventful            Sign Out: Acceptable/Baseline neuro status   Airway/Respiratory: Uneventful            Sign Out: Acceptable/Baseline resp. status   CV/Hemodynamics: Uneventful            Sign Out: Acceptable CV status   Other NRE: NONE   DID A NON-ROUTINE EVENT OCCUR?            Last vitals:  Vitals Value Taken Time   /85 04/03/25 1145   Temp 36.8  C (98.24  F) 04/03/25 1154   Pulse 80 04/03/25 1154   Resp 24 04/03/25 1154   SpO2 96 % 04/03/25 1154   Vitals shown include unfiled device data.    Electronically Signed By: Biju Pope MD  April 3, 2025  1:27 PM

## 2025-04-03 NOTE — PROGRESS NOTES
04/03/25 1700   Appointment Info   Signing Clinician's Name / Credentials (PT) Humberto Lagos DPT   Rehab Comments (PT) WBAT RLE, ROM as tolerated   Quick Adds   Quick Adds Certification   Living Environment   People in Home spouse   Current Living Arrangements house   Home Accessibility stairs to enter home;stairs within home   Number of Stairs, Main Entrance 1   Stair Railings, Main Entrance none   Number of Stairs, Within Home, Primary greater than 10 stairs   Stair Railings, Within Home, Primary railings safe and in good condition   Transportation Anticipated family or friend will provide   Living Environment Comments Lives in house with spouse, 1 HUSSEIN and can have all needs met on first floor, flight of steps upstairs and to the basement   Self-Care   Usual Activity Tolerance good   Current Activity Tolerance moderate   Equipment Currently Used at Home none   Fall history within last six months no   Activity/Exercise/Self-Care Comment At baseline pt IND with mobility and ADL's, has FWW to use   General Information   Onset of Illness/Injury or Date of Surgery 04/03/25   Referring Physician Coy Fowler MD   Patient/Family Therapy Goals Statement (PT) go home   Pertinent History of Current Problem (include personal factors and/or comorbidities that impact the POC) 79 y.o. female with hx of L TKA on 7/21/2023, s/p R TKA on 4/3/2025, POD#0   Existing Precautions/Restrictions fall   Weight-Bearing Status - LLE full weight-bearing   Weight-Bearing Status - RLE weight-bearing as tolerated   Cognition   Affect/Mental Status (Cognition) WNL   Orientation Status (Cognition) oriented x 4   Follows Commands (Cognition) WNL   Pain Assessment   Patient Currently in Pain No   Integumentary/Edema   Integumentary/Edema Comments dressing over R knee appears CDI   Posture    Posture Forward head position   Range of Motion (ROM)   Range of Motion ROM deficits secondary to surgical procedure;ROM deficits secondary to  pain;ROM deficits secondary to weakness;ROM deficits secondary to swelling   ROM Comment R knee AROM ~0-70   Strength (Manual Muscle Testing)   Strength (Manual Muscle Testing) Able to perform R SLR;Able to perform L SLR;Deficits observed during functional mobility   Strength Comments weakness in R knee post TKA but can demo good quad set and SLR   Bed Mobility   Comment, (Bed Mobility) SBA   Transfers   Comment, (Transfers) CGA with FWW   Gait/Stairs (Locomotion)   [place] Level (Gait) contact guard   Assistive Device (Gait) walker, front-wheeled   Distance in Feet (Gait) 10' eval   Pattern (Gait) step-through   Deviations/Abnormal Patterns (Gait) antalgic   Maintains Weight-bearing Status (Gait) able to maintain   Balance   Balance Comments impaired dynamic balance needing FWW and asst x 1   Sensory Examination   Sensory Perception patient reports no sensory changes   Clinical Impression   Criteria for Skilled Therapeutic Intervention Yes, treatment indicated   PT Diagnosis (PT) impaired gait   Influenced by the following impairments pain, ROM, strength, balance   Functional limitations due to impairments decreased ind with funcional mobility   Clinical Presentation (PT Evaluation Complexity) stable   Clinical Presentation Rationale clinical judgement   Clinical Decision Making (Complexity) low complexity   Planned Therapy Interventions (PT) balance training;bed mobility training;cryotherapy;gait training;home exercise program;patient/family education;ROM (range of motion);stair training;strengthening;stretching;transfer training;progressive activity/exercise   Risk & Benefits of therapy have been explained evaluation/treatment results reviewed;care plan/treatment goals reviewed;risks/benefits reviewed;current/potential barriers reviewed;participants voiced agreement with care plan;participants included;patient   PT Total Evaluation Time   PT Eval, Low Complexity Minutes (25982) 10   Therapy Certification    Start of care date 04/03/25   Certification date from 04/03/25   Certification date to 04/10/25   Medical Diagnosis R TKA   Physical Therapy Goals   PT Frequency 2x/day   PT Predicted Duration/Target Date for Goal Attainment 04/04/25   PT Goals Bed Mobility;Transfers;Gait;Stairs   PT: Bed Mobility Modified independent;Supine to/from sit   PT: Transfers Supervision/stand-by assist;Sit to/from stand;Assistive device   PT: Gait Supervision/stand-by assist;Rolling walker;150 feet   PT: Stairs Minimal assist;1 stair   Interventions   Interventions Quick Adds Gait Training;Therapeutic Activity;Therapeutic Procedure   Therapeutic Procedure/Exercise   Ther. Procedure: strength, endurance, ROM, flexibillity Minutes (07708) 7   Symptoms Noted During/After Treatment none   Treatment Detail/Skilled Intervention Educated on post surgical benefits of TE on circulation, functional ROM, and strength. Left pt with TE handout. With pt in supine cued for AP's x 20, on RLE: quad sets x 10, heel slides x 10, SAQ x 10, SLR x 10. Pt tolerated all TE well   Therapeutic Activity   Therapeutic Activities: dynamic activities to improve functional performance Minutes (33141) 18   Symptoms Noted During/After Treatment Fatigue   Treatment Detail/Skilled Intervention Greeted pt supine in bed. Eval completed. Educated on orders for WBAT, ROM to flexion tolerance, and importance of keeping knee straight when resting and demonstrated how to do so. With HOB elevated supine>sit SBA cues for sequencing and use of bedrail with pt needing increasd time, with HOB flat sit>supine SBA. Pt able to reposition in bed IND. Sit<>stand x 2 with FWW CGA, cues for safe walker and hand placement and pt able to demo back well, mildly unsteady upon standing. Pt amb to BR and performed stand<>sit from toilet using FWW and CGA, heavy cues for sequencing pt trying to push walker over toilet, tcs to keep walker in front and turn to back into toilet. Increased time for  line management and room set up   Gait Training   Gait Training Minutes (38151) 10   Symptoms Noted During/After Treatment (Gait Training) fatigue   Treatment Detail/Skilled Intervention With pt standing EOB in FWW with CGA cued for pre-gait marching i place, and pt able to clear both feet without knee buckling, a little unsteady. Then ambulated ~80' with FWW and CGA, very slow speed and sher, step-through pattern, cues for walker placement, and even step lengths, mildly unsteady throughout but no overt LOB   Distance in Feet 80   PT Discharge Planning   PT Plan review/progress HEP, progress gait with FWW, steps, safe transfers   PT Discharge Recommendation (DC Rec)   (defer to ortho)   PT Rationale for DC Rec Pt below baseline but moving well and using safe technique, able to ambulate ~80 with FWW and CGA, a little unsteady at times but good overall. Anticipate pt will progress and by discharge be at/near Mod-I bed mobility, SBA transfers with FWW, SBA amb with FWW, Rehana for steps. Pt will have good 24/7 support at home from spouse   PT Brief overview of current status CGA with FWW - Goals of therapy will be to address safe mobility and make recs for d/c to next level of care. Pt and RN will continue to follow all falls risk precautions as documented by RN staff while hospitalized   PT Total Distance Amb During Session (feet) 90   Physical Therapy Time and Intention   Timed Code Treatment Minutes 35   Total Session Time (sum of timed and untimed services) 45       [facility]                                                                                   OUTPATIENT PHYSICAL THERAPY    PLAN OF TREATMENT FOR OUTPATIENT REHABILITATION   Patient's Last Name, First Name, Liliane Benitez YOB: 1945   Provider's Name   [facility]   Medical Record No.  [id number]     Onset Date: 04/03/25 Start of Care Date: 04/03/25     Medical  Diagnosis:  R TKA               PT Diagnosis:  impaired gait Certification Dates:  From: 04/03/25  To: 04/10/25       See note for plan of treatment, functional goals, and certification details.    I CERTIFY THE NEED FOR THESE SERVICES FURNISHED UNDER        THIS PLAN OF TREATMENT AND WHILE UNDER MY CARE (Physician co-signature of this document indicates review and certification of the therapy plan).

## 2025-04-03 NOTE — PLAN OF CARE
Goal Outcome Evaluation:    Date/Time: 4/3/25 [id number]    Trauma/Ortho/Medical (Choose one): Ortho    Diagnosis: Advanced OA; Right Total Knee Arthroplasty   POD#: 0  Mental Status: A&Ox4  Activity/dangle: A of 1-2, GB/walker to BSC  Diet: Regular  Pain: Scheduled Tylenol and Toradol.  PRN PO Oxy and IV Dilaudid available  Hernández/Voiding: Voiding at BSC.  Dribbles.    Tele/Restraints/Iso: N/A  02/LDA: 2LO2.  R PIV with LR infusing @ 100 ml/hr  D/C Date: Pending    Other Info: CMS intact.  Dressing to RLE-CDI.  Ice packs to RLE.  Got IS up to 1250 ml.  Visited by .

## 2025-04-04 ENCOUNTER — APPOINTMENT (OUTPATIENT)
Dept: ULTRASOUND IMAGING | Facility: CLINIC | Age: 80
DRG: 469 | End: 2025-04-04
Attending: NURSE PRACTITIONER
Payer: COMMERCIAL

## 2025-04-04 ENCOUNTER — APPOINTMENT (OUTPATIENT)
Dept: CT IMAGING | Facility: CLINIC | Age: 80
DRG: 469 | End: 2025-04-04
Attending: NURSE PRACTITIONER
Payer: COMMERCIAL

## 2025-04-04 ENCOUNTER — APPOINTMENT (OUTPATIENT)
Dept: PHYSICAL THERAPY | Facility: CLINIC | Age: 80
DRG: 469 | End: 2025-04-04
Attending: ORTHOPAEDIC SURGERY
Payer: COMMERCIAL

## 2025-04-04 VITALS
RESPIRATION RATE: 16 BRPM | BODY MASS INDEX: 38.2 KG/M2 | OXYGEN SATURATION: 95 % | SYSTOLIC BLOOD PRESSURE: 119 MMHG | HEART RATE: 67 BPM | HEIGHT: 65 IN | WEIGHT: 229.3 LBS | TEMPERATURE: 98 F | DIASTOLIC BLOOD PRESSURE: 50 MMHG

## 2025-04-04 PROBLEM — Z96.651 STATUS POST RIGHT KNEE REPLACEMENT: Status: ACTIVE | Noted: 2025-04-04

## 2025-04-04 LAB
ALBUMIN SERPL BCG-MCNC: 3.3 G/DL (ref 3.5–5.2)
ALP SERPL-CCNC: 84 U/L (ref 40–150)
ALT SERPL W P-5'-P-CCNC: 9 U/L (ref 0–50)
ANION GAP SERPL CALCULATED.3IONS-SCNC: 15 MMOL/L (ref 7–15)
AST SERPL W P-5'-P-CCNC: 18 U/L (ref 0–45)
BASOPHILS # BLD AUTO: 0 10E3/UL (ref 0–0.2)
BASOPHILS NFR BLD AUTO: 0 %
BILIRUB SERPL-MCNC: 0.8 MG/DL
BUN SERPL-MCNC: 23.4 MG/DL (ref 8–23)
CA-I BLD-MCNC: 3.9 MG/DL (ref 4.4–5.2)
CALCIUM SERPL-MCNC: 7.5 MG/DL (ref 8.8–10.4)
CHLORIDE SERPL-SCNC: 102 MMOL/L (ref 98–107)
CREAT SERPL-MCNC: 1.05 MG/DL (ref 0.51–0.95)
EGFRCR SERPLBLD CKD-EPI 2021: 53 ML/MIN/1.73M2
EOSINOPHIL # BLD AUTO: 0 10E3/UL (ref 0–0.7)
EOSINOPHIL NFR BLD AUTO: 0 %
ERYTHROCYTE [DISTWIDTH] IN BLOOD BY AUTOMATED COUNT: 13.3 % (ref 10–15)
FASTING STATUS PATIENT QL REPORTED: YES
GLUCOSE BLDC GLUCOMTR-MCNC: 112 MG/DL (ref 70–99)
GLUCOSE SERPL-MCNC: 116 MG/DL (ref 70–99)
GLUCOSE SERPL-MCNC: 141 MG/DL (ref 70–99)
HCO3 SERPL-SCNC: 25 MMOL/L (ref 22–29)
HCT VFR BLD AUTO: 35.4 % (ref 35–47)
HGB BLD-MCNC: 11.5 G/DL (ref 11.7–15.7)
HGB BLD-MCNC: 12 G/DL (ref 11.7–15.7)
IMM GRANULOCYTES # BLD: 0.1 10E3/UL
IMM GRANULOCYTES NFR BLD: 1 %
LYMPHOCYTES # BLD AUTO: 0.9 10E3/UL (ref 0.8–5.3)
LYMPHOCYTES NFR BLD AUTO: 8 %
MAGNESIUM SERPL-MCNC: 1.9 MG/DL (ref 1.7–2.3)
MCH RBC QN AUTO: 29.7 PG (ref 26.5–33)
MCHC RBC AUTO-ENTMCNC: 33.9 G/DL (ref 31.5–36.5)
MCV RBC AUTO: 88 FL (ref 78–100)
MONOCYTES # BLD AUTO: 1 10E3/UL (ref 0–1.3)
MONOCYTES NFR BLD AUTO: 9 %
NEUTROPHILS # BLD AUTO: 8.7 10E3/UL (ref 1.6–8.3)
NEUTROPHILS NFR BLD AUTO: 81 %
NRBC # BLD AUTO: 0 10E3/UL
NRBC BLD AUTO-RTO: 0 /100
PLATELET # BLD AUTO: 221 10E3/UL (ref 150–450)
POTASSIUM SERPL-SCNC: 3.6 MMOL/L (ref 3.4–5.3)
PROT SERPL-MCNC: 5.7 G/DL (ref 6.4–8.3)
RADIOLOGIST FLAGS: ABNORMAL
RBC # BLD AUTO: 4.04 10E6/UL (ref 3.8–5.2)
SODIUM SERPL-SCNC: 142 MMOL/L (ref 135–145)
TROPONIN T SERPL HS-MCNC: 13 NG/L
TROPONIN T SERPL HS-MCNC: 17 NG/L
TSH SERPL DL<=0.005 MIU/L-ACNC: 0.6 UIU/ML (ref 0.3–4.2)
WBC # BLD AUTO: 10.7 10E3/UL (ref 4–11)

## 2025-04-04 PROCEDURE — 85018 HEMOGLOBIN: CPT | Performed by: NURSE PRACTITIONER

## 2025-04-04 PROCEDURE — 83735 ASSAY OF MAGNESIUM: CPT | Performed by: NURSE PRACTITIONER

## 2025-04-04 PROCEDURE — 97530 THERAPEUTIC ACTIVITIES: CPT | Mod: GP | Performed by: PHYSICAL THERAPIST

## 2025-04-04 PROCEDURE — 258N000003 HC RX IP 258 OP 636: Performed by: NURSE PRACTITIONER

## 2025-04-04 PROCEDURE — 250N000013 HC RX MED GY IP 250 OP 250 PS 637: Performed by: NURSE PRACTITIONER

## 2025-04-04 PROCEDURE — 93010 ELECTROCARDIOGRAM REPORT: CPT | Performed by: INTERNAL MEDICINE

## 2025-04-04 PROCEDURE — 250N000011 HC RX IP 250 OP 636: Performed by: NURSE PRACTITIONER

## 2025-04-04 PROCEDURE — 250N000011 HC RX IP 250 OP 636: Mod: JZ | Performed by: ORTHOPAEDIC SURGERY

## 2025-04-04 PROCEDURE — 999N000040 HC STATISTIC CONSULT NO CHARGE VASC ACCESS

## 2025-04-04 PROCEDURE — 250N000013 HC RX MED GY IP 250 OP 250 PS 637: Performed by: ORTHOPAEDIC SURGERY

## 2025-04-04 PROCEDURE — 250N000009 HC RX 250: Performed by: NURSE PRACTITIONER

## 2025-04-04 PROCEDURE — 36415 COLL VENOUS BLD VENIPUNCTURE: CPT | Performed by: NURSE PRACTITIONER

## 2025-04-04 PROCEDURE — 85025 COMPLETE CBC W/AUTO DIFF WBC: CPT | Performed by: ORTHOPAEDIC SURGERY

## 2025-04-04 PROCEDURE — 82330 ASSAY OF CALCIUM: CPT | Performed by: NURSE PRACTITIONER

## 2025-04-04 PROCEDURE — 93005 ELECTROCARDIOGRAM TRACING: CPT

## 2025-04-04 PROCEDURE — 71275 CT ANGIOGRAPHY CHEST: CPT

## 2025-04-04 PROCEDURE — 120N000001 HC R&B MED SURG/OB

## 2025-04-04 PROCEDURE — 82962 GLUCOSE BLOOD TEST: CPT

## 2025-04-04 PROCEDURE — 99207 PR NO BILLABLE SERVICE THIS VISIT: CPT | Performed by: PHYSICIAN ASSISTANT

## 2025-04-04 PROCEDURE — 93970 EXTREMITY STUDY: CPT

## 2025-04-04 PROCEDURE — 97116 GAIT TRAINING THERAPY: CPT | Mod: GP | Performed by: PHYSICAL THERAPIST

## 2025-04-04 PROCEDURE — 999N000127 HC STATISTIC PERIPHERAL IV START W US GUIDANCE

## 2025-04-04 PROCEDURE — 82947 ASSAY GLUCOSE BLOOD QUANT: CPT | Performed by: INTERNAL MEDICINE

## 2025-04-04 PROCEDURE — 250N000013 HC RX MED GY IP 250 OP 250 PS 637: Performed by: INTERNAL MEDICINE

## 2025-04-04 PROCEDURE — 36415 COLL VENOUS BLD VENIPUNCTURE: CPT | Performed by: ORTHOPAEDIC SURGERY

## 2025-04-04 PROCEDURE — 84484 ASSAY OF TROPONIN QUANT: CPT | Performed by: NURSE PRACTITIONER

## 2025-04-04 PROCEDURE — 99291 CRITICAL CARE FIRST HOUR: CPT | Performed by: NURSE PRACTITIONER

## 2025-04-04 PROCEDURE — 84443 ASSAY THYROID STIM HORMONE: CPT | Performed by: NURSE PRACTITIONER

## 2025-04-04 PROCEDURE — 82947 ASSAY GLUCOSE BLOOD QUANT: CPT | Performed by: NURSE PRACTITIONER

## 2025-04-04 RX ORDER — CALCIUM GLUCONATE 20 MG/ML
1 INJECTION, SOLUTION INTRAVENOUS ONCE
Status: COMPLETED | OUTPATIENT
Start: 2025-04-04 | End: 2025-04-04

## 2025-04-04 RX ORDER — IOPAMIDOL 755 MG/ML
79 INJECTION, SOLUTION INTRAVASCULAR ONCE
Status: COMPLETED | OUTPATIENT
Start: 2025-04-04 | End: 2025-04-04

## 2025-04-04 RX ADMIN — PANTOPRAZOLE SODIUM 40 MG: 40 TABLET, DELAYED RELEASE ORAL at 06:32

## 2025-04-04 RX ADMIN — LEVOTHYROXINE SODIUM 112 MCG: 112 TABLET ORAL at 06:32

## 2025-04-04 RX ADMIN — APIXABAN 10 MG: 5 TABLET, FILM COATED ORAL at 15:20

## 2025-04-04 RX ADMIN — KETOROLAC TROMETHAMINE 15 MG: 15 INJECTION, SOLUTION INTRAMUSCULAR; INTRAVENOUS at 02:25

## 2025-04-04 RX ADMIN — SENNOSIDES AND DOCUSATE SODIUM 1 TABLET: 50; 8.6 TABLET ORAL at 21:40

## 2025-04-04 RX ADMIN — OXYCODONE HYDROCHLORIDE 5 MG: 5 TABLET ORAL at 22:50

## 2025-04-04 RX ADMIN — ATORVASTATIN CALCIUM 10 MG: 10 TABLET, FILM COATED ORAL at 08:09

## 2025-04-04 RX ADMIN — AMITRIPTYLINE HYDROCHLORIDE 25 MG: 25 TABLET, FILM COATED ORAL at 21:41

## 2025-04-04 RX ADMIN — SODIUM CHLORIDE, POTASSIUM CHLORIDE, SODIUM LACTATE AND CALCIUM CHLORIDE 500 ML: 600; 310; 30; 20 INJECTION, SOLUTION INTRAVENOUS at 12:23

## 2025-04-04 RX ADMIN — CEFAZOLIN SODIUM 2 G: 2 SOLUTION INTRAVENOUS at 00:44

## 2025-04-04 RX ADMIN — ACETAMINOPHEN 975 MG: 325 TABLET ORAL at 15:13

## 2025-04-04 RX ADMIN — CALCIUM GLUCONATE 1 G: 20 INJECTION, SOLUTION INTRAVENOUS at 15:14

## 2025-04-04 RX ADMIN — ACETAMINOPHEN 975 MG: 325 TABLET ORAL at 06:32

## 2025-04-04 RX ADMIN — POLYETHYLENE GLYCOL 3350 17 G: 17 POWDER, FOR SOLUTION ORAL at 08:09

## 2025-04-04 RX ADMIN — SODIUM CHLORIDE, POTASSIUM CHLORIDE, SODIUM LACTATE AND CALCIUM CHLORIDE 500 ML: 600; 310; 30; 20 INJECTION, SOLUTION INTRAVENOUS at 12:22

## 2025-04-04 RX ADMIN — SENNOSIDES AND DOCUSATE SODIUM 1 TABLET: 50; 8.6 TABLET ORAL at 08:09

## 2025-04-04 RX ADMIN — ASPIRIN 325 MG: 325 TABLET, COATED ORAL at 08:09

## 2025-04-04 RX ADMIN — SODIUM CHLORIDE 100 ML: 9 INJECTION, SOLUTION INTRAVENOUS at 11:58

## 2025-04-04 RX ADMIN — ACETAMINOPHEN 975 MG: 325 TABLET ORAL at 21:41

## 2025-04-04 RX ADMIN — IOPAMIDOL 79 ML: 755 INJECTION, SOLUTION INTRAVENOUS at 11:55

## 2025-04-04 RX ADMIN — APIXABAN 10 MG: 5 TABLET, FILM COATED ORAL at 21:40

## 2025-04-04 ASSESSMENT — ACTIVITIES OF DAILY LIVING (ADL)
ADLS_ACUITY_SCORE: 43
ADLS_ACUITY_SCORE: 38
ADLS_ACUITY_SCORE: 39
ADLS_ACUITY_SCORE: 43
ADLS_ACUITY_SCORE: 39
ADLS_ACUITY_SCORE: 43
ADLS_ACUITY_SCORE: 39
ADLS_ACUITY_SCORE: 39
ADLS_ACUITY_SCORE: 43
ADLS_ACUITY_SCORE: 39
ADLS_ACUITY_SCORE: 43
ADLS_ACUITY_SCORE: 39
ADLS_ACUITY_SCORE: 38
ADLS_ACUITY_SCORE: 39
ADLS_ACUITY_SCORE: 39
ADLS_ACUITY_SCORE: 43
ADLS_ACUITY_SCORE: 39
ADLS_ACUITY_SCORE: 39
ADLS_ACUITY_SCORE: 38
ADLS_ACUITY_SCORE: 39

## 2025-04-04 NOTE — PROGRESS NOTES
Hospital Medicine RRT/RAYA follow up    Event of RRT reviewed  Re-examined patient in early afternoon- she is awake, alert, no chest pain, no dyspnea, and feels mentally back to baseline.   in room concurs  Vitals - stable - 99% sats on 2 liters    Follow up High sens trop - negative    Reviewed CTPA results:  IMPRESSION:  1.  Small segmental pulmonary embolism in the left upper lobe. No other pulmonary emboli. No right heart strain.  2.  Linear opacities in the lower lobes at the lung bases favored to be atelectasis, less likely pneumonia.    Echo - pending (prior echo in 2023 - pulmonary HTN    US of BLE - venous ultrasound -   IMPRESSION:  1.  No deep venous thrombosis in the visualized portions of the bilateral lower extremities.         Discussed with on -call Ortho PA - Lili Robbins - results of CTPA showing small segmental US  Ortho approved use of therapeutic anticoagulation for PE, and to stop DVT ppx with ASA    - ordered Eliquis 10 mg bid for 7 days, then 5 mg daily; recommend 3 month duration for segmental PE  - replaced calcium (ionized calcium low) - 1 gram calcium gluconate  - updated patient and spouse on her plan  - may proceed with PT as ordered with goal of discharge on 4/5  - updated [facility] medicine PA - ROCKY Arevalo [facility] med - House RAYA

## 2025-04-04 NOTE — SIGNIFICANT EVENT
RRT called around 0920.  Pt was unresponsive was sitting in chair, staff was calld by , staff put pt back to bed with help of celling lift.  Pt started responding, RRT team came around 0930.  Please see RRT team note.

## 2025-04-04 NOTE — CODE/RAPID RESPONSE
[facility]    RRT Note  4/4/2025   Time Called: 0931    Code Status: Full Code    I was called to evaluate Liliane Romo, who is a 79 year old female who was admitted on 4/3/2025 for elective RTKA with Dr. Fowler. PMH includes hyperlipidemia, hypothyroidism, migraine and GERD.    Assessment & Plan     Syncope  Acute hypoxic respiratory failure   Sinus bradycardia with New RBBB and twave inversion V3-V6  Acute surgical blood loss anemia  RRT called to evaluate patient for an unresponsive episode following her physical therapy session. Upon entering room, bedside nursing and flying squad are present. Bedside nurse Eric reports that she was called to room with patient's  reporting that she was unresponsive after therapy services with her eyes rolling back shortly after returning to bed. Review of patient's chart and confirmed with  is that patient had a similar episode 2 years ago with her Left TKA.     Nursing reports immediate vitals with event with SBP dropping to 86, hypoxia with SPO2 in the 80s. She was placed on oxygen and laid flat in bed with patient becoming alert.     Upon entering room, patient is laying in bed with eyes open, alert, answering my questions appropriately. She denies any fever, chills, or dizziness currently. She denies chest pain or shortness of breath although currently requiring 3L NC to keep saturations >90.     Labs reviewed from this morning, hgb is stable;However, drop from preoperative from 13.9<11.5. Flying squad working on IV placement for interventions.     Addendum:   4/4/2025 1145  Patient was a difficult lab draw with now returning chem and troponin   Acute kidney injury, concern for ATN   Elevated troponin, suspect demand ischemia   Hypocalcemia   -CT PE still pending   -LR bolus not yet given with difficult IV insertion, IV team/vascular access ordered.   -Will order addition 500ml LR   -ionized calcium pending   -recheck troponin and  trend   -am labs    -differentials include syncope due to volume loss, PE, arrhythmia, hypoxia       INTERVENTIONS:  -EKG stat  -CT PE study   -CBC, CMP, troponin   -LR bolus 500ml   -cardiac monitoring   -Echocardiogram   -check am labs     Discussed with Roxann Kwong PA-C [facility] hospitalist       JAMILA Graves CNP  [facility]  Securely message with the Vocera Web Console (learn more here)  Text page via [employer] Paging/Directory          Physical Exam   Vital Signs with Ranges:  Temp:  [97.7  F (36.5  C)-98.9  F (37.2  C)] 98.9  F (37.2  C)  Pulse:  [61-81] 61  Resp:  [14-23] 22  BP: ()/() 86/41  SpO2:  [88 %-99 %] 99 %  I/O last 3 completed shifts:  In: 1716.7 [P.O.:600; I.V.:1116.7]  Out: 525 [Urine:425; Blood:100]    Orthostatic:                Physical Exam  Constitutional:       General: She is not in acute distress.     Appearance: Normal appearance.   HENT:      Mouth/Throat:      Mouth: Mucous membranes are dry.   Cardiovascular:      Rate and Rhythm: Bradycardia present.   Pulmonary:      Effort: Pulmonary effort is normal.      Breath sounds: Normal breath sounds.   Abdominal:      General: Bowel sounds are normal.      Palpations: Abdomen is soft.   Skin:     General: Skin is warm and dry.      Coloration: Skin is pale.   Neurological:      General: No focal deficit present.      Mental Status: She is alert and oriented to person, place, and time.          Data     EKG:  Interpreted by RCOKY Graves CNP  Time reviewed: 0955  Symptoms at time of EKG: syncope, hypoxia    Rhythm: sinus bradycardia  Rate: 50-60    New RBBB with twave inversion present in v3-v6      IMAGING: (X-ray/CT/MRI)   Recent Results (from the past 24 hours)   XR Knee Port Right 1/2 Views    Narrative    EXAM: XR KNEE PORT RIGHT 1/2 VIEWS  LOCATION: [facility]  DATE: 4/3/2025    INDICATION: Post Op Total Knee  COMPARISON: None.      Impression     "IMPRESSION: Status post recent right knee arthroplasty with normal alignment and no evidence of acute hardware complication. Expected surrounding postoperative edema and gas. Overlying bandage material.       CBC with Diff:  Recent Labs   Lab Test 04/04/25  0727 07/25/23  0829   WBC  --  7.0   HGB 11.5* 12.0   MCV  --  90   PLT  --  231      No results found for: \"RETICABSCT\"  No results found for: \"RETP\"    Comprehensive Metabolic Panel:  Recent Labs   Lab 04/04/25  0935 04/04/25  0727 04/03/25  0614   POTASSIUM  --   --  3.6   *   < > 114*   CR  --   --  0.73   GFRESTIMATED  --   --  83    < > = values in this interval not displayed.         Time Spent on this Encounter     CRITICAL CARE TIME  I spent 57 minutes  of critical care time on the unit/floor managing the care of Liliane Romo. Upon evaluation, this patient had a high probability of imminent or life-threatening deterioration due to syncope and hypoxia which required my direct attention, intervention, and personal management. 100% of my time was spent at the bedside counseling the patient and/or coordinating care regarding services listed in this note.   "

## 2025-04-04 NOTE — PROGRESS NOTES
ORTHOPEDIC PROGRESS NOTE    Today's Date: 4/4/25      Admit Date: 4/3/25       LOS: 1  Procedure(s) (LRB): s/p right total knee arthroplasty    SUBJECTIVE  Patient seen this morning after a rapid response was called. Patient is now stable and is waiting for IV placement, followed by CT scan and echocardiogram--ordered by internal medicine.     OBJECTIVE  Recent Labs   Lab 04/04/25  0727   HGB 11.5*          Gen: awake, laying in bed, on O2 via NC, being tended to by nursing staff  Extremities:  Unable to assess at this time    Principle Problem  Right total knee arthoplasty       ASSESSMENT & PLAN   79 year old female  -POD1, s/p right TKA by Dr. Burton.   -activity/WB: WBAT, PT/OT  -antibiotics: periop 24 hours--completed  -labs: hgb: 11.5, other labs pending at this time.   -vte prophylaxis: SCDs,   -dispo & follow up: discharge pending medical work up and stabilization, anticipate at minimum 1 more night stay. Will continue to follow. Follow up in office in 3 weeks regardless of discharge.     Fany Robbins PA-C  [facility]

## 2025-04-04 NOTE — PROGRESS NOTES
[facility]    Medicine Progress Note - Hospitalist Service    Date of Admission:  4/3/2025    Assessment & Plan   Liliane Romo is an 79-year-old female with history of hyperlipidemia, hypothyroidism, migraine headaches, GERD, who underwent right total knee arthroplasty on 4/3/2023. Hospital medicine following for comanagement of medical conditions.     S/p right total knee arthroplasty, Dr. Fowler, 4/3/2025 for advanced osteoarthritis  Acute surgical blood loss anemia   Doing well postoperatively initially.  Surgical management per orthopedics  -Continue pain control.    -PT.    -Monitor hemoglobin  -DVT prophylaxis per orthopedic    Recent Labs   Lab 04/04/25  1108 04/04/25  0727   HGB 12.0 11.5*       Small segmental pulmonary embolism CECELIA  Elevated troponin, suspect demand ischemia   Mild acute hypoxic respiratory failure secondary to above  Episode of syncope and hypotension likely related to above 4/4/2025  Sinus bradycardia with New RBBB and twave inversion V3-V6   Had an RRT postoperative day #1 called for hypotension, lethargy, syncope, found to have PE left upper lobe.  No evidence of other emboli or right heart strain.  Small linear opacities in bilateral lower lobes favored to be atelectasis, less likely pneumonia.  PE is postoperative, case was discussed with Ortho and started on full dose Eliquis, stopped ASA and DVT ppx.  BLE venous duplex ultrasounds negative for DVT.  Troponin 17, 13.  -Eliquis 10 mg bid for 7 days, then 5 mg daily; recommend 3 month duration for segmental PE   -Pulmonary hygiene  -Echo pending  -Wean off oxygen, monitor pulse ox closely  -Telemetry  -Follow hemoglobin    Mild GRACIE likely due to hypotension  Creatinine arturo from 0.73-1.05 in the setting of hypotension and PE.    Hypocalcemia  Ionized calcium 3.9  -Replete as needed     Hyperlipidemia: Continue atorvastatin 10 mg daily     Hypothyroidism: TSH within normal limits.  Continue levothyroxine 125  "mcg daily     GERD: Continue PPI     Chronic migraine headaches  -Continue atenolol and amitriptyline.  Patient reports with atenolol and amitriptyline, her migraine headaches have been well-controlled with no current issues.     Moderate pulmonary hypertension on echo in 2023  -Echo 7/2023 was obtained when patient had presyncope after left knee arthroplasty.  Echo showed hyperdynamic LV function of 70%, elevated RV pressure consistent with pulmonary hypertension  -Follow-up with PCP  - Echo as above          Diet: Advance Diet as Tolerated: Regular Diet Adult  Discharge Instruction - Regular Diet Adult    DVT Prophylaxis: DOAC  Hernández Catheter: Not present  Lines: None     Cardiac Monitoring: ACTIVE order. Indication: Syncope- low cardiac risk (24 hours)  Code Status: Full Code      Clinically Significant Risk Factors Present on Admission           # Hypocalcemia: Lowest Ca = 7.5 mg/dL in last 2 days, will monitor and replace as appropriate     # Hypoalbuminemia: Lowest albumin = 3.3 g/dL at 4/4/2025 11:08 AM, will monitor as appropriate               # Obesity: Estimated body mass index is 38.16 kg/m  as calculated from the following:    Height as of this encounter: 1.651 m (5' 5\").    Weight as of this encounter: 104 kg (229 lb 4.8 oz).              Social Drivers of Health            Disposition Plan     Medically Ready for Discharge: Anticipated Tomorrow           The patient's care was discussed with the Attending Physician, Dr. Silva, Bedside Nurse, Patient, Patient's Family, and House RAYA .    Roxann Kwong PA-C  Hospitalist Service  [facility]  Securely message with [employer] (more info)  Text page via [employer] Paging/Directory   ______________________________________________________________________    Interval History   And examined, spouse at bedside.  Reviewed RRT from earlier today, patient has a PE and started on Eliquis as approved by orthopedics.  Ultrasound negative for " DVT.  Currently feeling improved, blood pressure improved to 147/76.  No new complaints.  Stable on 2 L of nasal cannula. Questions welcomed and answered to the best of my knowledge.      Physical Exam   Vital Signs: Temp: 97.6  F (36.4  C) Temp src: Oral BP: (!) 147/76 Pulse: 67   Resp: 18 SpO2: 97 % O2 Device: Nasal cannula Oxygen Delivery: 2 LPM  Weight: 229 lbs 4.8 oz    Physical Exam    General: Awake, alert, very pleasant woman who appears stated age. Looks comfortable sitting up in bed. No acute distress.  HEENT: Normocephalic, atraumatic. Extraocular movements intact.  Respiratory: Clear to auscultation bilaterally, no rales, wheezing, or rhonchi.  2 L nasal cannula in place without increased work of breathing.  Cardiovascular: Regular rate and rhythm, +S1 and S2, no murmur auscultated. No peripheral edema.   Gastrointestinal: Soft, non-tender, non-distended. Bowel sounds present.  Skin: Warm, dry. No obvious rashes or lesions on exposed skin. Dorsalis pedis pulses palpable bilaterally.  Musculoskeletal: Right knee Ace wrapped  Neurologic: AAO x3.   Psychiatric: Appropriate mood and affect. No obvious anxiety or depression.      Medical Decision Making       >35 MINUTES SPENT BY ME on the date of service doing chart review, history, exam, documentation & further activities per the note.      Data     I have personally reviewed the following data over the past 24 hrs:    10.7  \   12.0   / 221     142 102 23.4 (H) /  141 (H)   3.6 25 1.05 (H) \     ALT: 9 AST: 18 AP: 84 TBILI: 0.8   ALB: 3.3 (L) TOT PROTEIN: 5.7 (L) LIPASE: N/A     Trop: 13 BNP: N/A     TSH: 0.60 T4: N/A A1C: N/A       Imaging results reviewed over the past 24 hrs:   Recent Results (from the past 24 hours)   CT Chest Pulmonary Embolism w Contrast   Result Value    Radiologist flags New diagnosis of pulmonary embolism (AA)    Narrative    EXAM: CT CHEST PULMONARY EMBOLISM W CONTRAST  LOCATION: [facility]  DATE:  4/4/2025    INDICATION: syncope, hypoxia, new RBBB ekg  COMPARISON: None.  TECHNIQUE: CT chest pulmonary angiogram during arterial phase injection of IV contrast. Multiplanar reformats and MIP reconstructions were performed. Dose reduction techniques were used.   CONTRAST: 79 mL Isovue 370    FINDINGS:  ANGIOGRAM CHEST: Small filling defect in a left upper lobe segmental pulmonary artery (series 3, image 109), suspicious for small burden of pulmonary embolism. No other pulmonary emboli. Thoracic aorta is negative for dissection. No CT evidence of right   heart strain.    LUNGS AND PLEURA: Linear opacities in the bilateral lower lobes at the lung bases favored to be atelectasis, less likely pneumonia. No pleural effusion. Few calcified granuloma at the left lung base.    MEDIASTINUM/AXILLAE: Thyroidectomy. No lymphadenopathy. No pericardial effusion. Small hiatal hernia.    CORONARY ARTERY CALCIFICATION: None.    UPPER ABDOMEN: A few cysts in the visualized portions of the kidneys. Punctate calcified granuloma in the spleen.    MUSCULOSKELETAL: No suspicious lesions in the bones.      Impression    IMPRESSION:  1.  Small segmental pulmonary embolism in the left upper lobe. No other pulmonary emboli. No right heart strain.  2.  Linear opacities in the lower lobes at the lung bases favored to be atelectasis, less likely pneumonia.      [Critical Result: New diagnosis of pulmonary embolism]    Finding was identified on 4/4/2025 12:25 PM CDT.     Dr. Kwong was contacted by me on 4/4/2025 12:53 PM CDT and verbalized understanding of the critical result.     US Lower Extremity Venous Duplex Bilateral    Narrative    EXAM: US LOWER EXTREMITY VENOUS DUPLEX BILATERAL  LOCATION: [facility]  DATE: 4/4/2025    INDICATION: PE found on CTPA , assess for clot burden  COMPARISON: None.  TECHNIQUE: Venous Duplex ultrasound of bilateral lower extremities with and without compression, augmentation and duplex.  Color flow and spectral Doppler with waveform analysis performed.    FINDINGS: Exam includes the common femoral, femoral, popliteal veins as well as segmentally visualized deep calf veins and greater saphenous vein. Bilateral calf veins are not well-visualized due to edema and overlying bandage on the right.    RIGHT: No deep vein thrombosis. No superficial thrombophlebitis. No popliteal cyst.    LEFT: No deep vein thrombosis. No superficial thrombophlebitis. No popliteal cyst.      Impression    IMPRESSION:  1.  No deep venous thrombosis in the visualized portions of the bilateral lower extremities.

## 2025-04-04 NOTE — PLAN OF CARE
Occupational Therapy: Orders received. Chart reviewed and discussed with care team.? Occupational Therapy not indicated per PT; had prev surgery, assist at home, no concerns with ADLs.? Defer discharge recommendations to PT.? Will complete orders.

## 2025-04-04 NOTE — PLAN OF CARE
Goal Outcome Evaluation:    Patient vital signs are at baseline: Yes  Patient able to ambulate as they were prior to admission or with assist devices provided by therapies during their stay:  Yes  Patient MUST void prior to discharge:  Yes  Patient able to tolerate oral intake:  Yes  Pain has adequate pain control using Oral analgesics:  Yes  Does patient have an identified :  Yes  Has goal D/C date and time been discussed with patient:  Yes     A/O x 4. CMS intact. Dressing CDI. PIV sl.

## 2025-04-04 NOTE — PLAN OF CARE
Goal Outcome Evaluation:       Patient vital signs are at baseline: Yes  Patient able to ambulate as they were prior to admission or with assist devices provided by therapies during their stay:  Yes  Patient MUST void prior to discharge:  Yes  Patient able to tolerate oral intake:  Yes  Pain has adequate pain control using Oral analgesics:  Yes  Does patient have an identified :  Yes  Has goal D/C date and time been discussed with patient:  No,  Reason:  RRT     A&O x 4. VSS, 2L NC. CMS intact. Pain managed with tylenol. Assist of 1 with walker. Voding in BR.  RRT called today.

## 2025-04-04 NOTE — PROGRESS NOTES
Patient vital signs are at baseline: Yes  Patient able to ambulate as they were prior to admission or with assist devices provided by therapies during their stay:  Yes  Patient MUST void prior to discharge:  Yes  Patient able to tolerate oral intake:  Yes  Pain has adequate pain control using Oral analgesics:  Yes  Does patient have an identified :  Yes  Has goal D/C date and time been discussed with patient:  Yes       R total knee replacement, POD #1, VSS, A/O x4, forgetful at times, Ax1 w/ GB + walker, regular diet, pain controlled, external catheter, NSR on tele, 2L NC.

## 2025-04-04 NOTE — PROGRESS NOTES
Diagnosis: Advanced OA; Right Total Knee Arthroplasty   POD#: 0  Mental Status: A/Ox4  Activity/dangle: SBA GBW  Diet: Regular  Pain: Scheduled Tylenol and Toradol.  PRN Oxy x1  Hernández/Voiding: Ambulating to BR. Cont.     Tele/Restraints/Iso: N/A  02/LDA: 2LO2.  R PIV with LR infusing @ 100 ml/hr  D/C Date: Pending     Other Info: CMS intact.  Dressing to RLE-CDI. Ice packs to RLE.  IS use.

## 2025-04-05 ENCOUNTER — APPOINTMENT (OUTPATIENT)
Dept: PHYSICAL THERAPY | Facility: CLINIC | Age: 80
DRG: 469 | End: 2025-04-05
Attending: ORTHOPAEDIC SURGERY
Payer: COMMERCIAL

## 2025-04-05 ENCOUNTER — APPOINTMENT (OUTPATIENT)
Dept: CARDIOLOGY | Facility: CLINIC | Age: 80
DRG: 469 | End: 2025-04-05
Attending: NURSE PRACTITIONER
Payer: COMMERCIAL

## 2025-04-05 PROBLEM — Z96.652 STATUS POST TOTAL LEFT KNEE REPLACEMENT: Status: ACTIVE | Noted: 2023-07-22

## 2025-04-05 LAB
ANION GAP SERPL CALCULATED.3IONS-SCNC: 11 MMOL/L (ref 7–15)
ATRIAL RATE - MUSE: 59 BPM
BUN SERPL-MCNC: 16.8 MG/DL (ref 8–23)
CALCIUM SERPL-MCNC: 7.7 MG/DL (ref 8.8–10.4)
CHLORIDE SERPL-SCNC: 104 MMOL/L (ref 98–107)
CREAT SERPL-MCNC: 0.66 MG/DL (ref 0.51–0.95)
DIASTOLIC BLOOD PRESSURE - MUSE: NORMAL MMHG
EGFRCR SERPLBLD CKD-EPI 2021: 88 ML/MIN/1.73M2
GLUCOSE SERPL-MCNC: 161 MG/DL (ref 70–99)
GLUCOSE SERPL-MCNC: 161 MG/DL (ref 70–99)
HCO3 SERPL-SCNC: 25 MMOL/L (ref 22–29)
HGB BLD-MCNC: 10.8 G/DL (ref 11.7–15.7)
INTERPRETATION ECG - MUSE: NORMAL
LVEF ECHO: NORMAL
P AXIS - MUSE: 0 DEGREES
POTASSIUM SERPL-SCNC: 3.5 MMOL/L (ref 3.4–5.3)
PR INTERVAL - MUSE: 166 MS
QRS DURATION - MUSE: 128 MS
QT - MUSE: 498 MS
QTC - MUSE: 493 MS
R AXIS - MUSE: 25 DEGREES
SODIUM SERPL-SCNC: 140 MMOL/L (ref 135–145)
SYSTOLIC BLOOD PRESSURE - MUSE: NORMAL MMHG
T AXIS - MUSE: 68 DEGREES
VENTRICULAR RATE- MUSE: 59 BPM

## 2025-04-05 PROCEDURE — 99233 SBSQ HOSP IP/OBS HIGH 50: CPT | Performed by: INTERNAL MEDICINE

## 2025-04-05 PROCEDURE — 255N000002 HC RX 255 OP 636: Performed by: NURSE PRACTITIONER

## 2025-04-05 PROCEDURE — 250N000013 HC RX MED GY IP 250 OP 250 PS 637: Performed by: PHYSICIAN ASSISTANT

## 2025-04-05 PROCEDURE — 82947 ASSAY GLUCOSE BLOOD QUANT: CPT | Performed by: HOSPITALIST

## 2025-04-05 PROCEDURE — 250N000013 HC RX MED GY IP 250 OP 250 PS 637: Performed by: ORTHOPAEDIC SURGERY

## 2025-04-05 PROCEDURE — 80048 BASIC METABOLIC PNL TOTAL CA: CPT | Performed by: NURSE PRACTITIONER

## 2025-04-05 PROCEDURE — 250N000013 HC RX MED GY IP 250 OP 250 PS 637: Performed by: NURSE PRACTITIONER

## 2025-04-05 PROCEDURE — 36415 COLL VENOUS BLD VENIPUNCTURE: CPT | Performed by: ORTHOPAEDIC SURGERY

## 2025-04-05 PROCEDURE — 120N000001 HC R&B MED SURG/OB

## 2025-04-05 PROCEDURE — 93306 TTE W/DOPPLER COMPLETE: CPT | Mod: 26 | Performed by: INTERNAL MEDICINE

## 2025-04-05 PROCEDURE — 97116 GAIT TRAINING THERAPY: CPT | Mod: GP | Performed by: PHYSICAL THERAPY ASSISTANT

## 2025-04-05 PROCEDURE — 999N000208 ECHOCARDIOGRAM COMPLETE

## 2025-04-05 PROCEDURE — 85018 HEMOGLOBIN: CPT | Performed by: ORTHOPAEDIC SURGERY

## 2025-04-05 PROCEDURE — 250N000013 HC RX MED GY IP 250 OP 250 PS 637: Performed by: INTERNAL MEDICINE

## 2025-04-05 RX ADMIN — SENNOSIDES AND DOCUSATE SODIUM 1 TABLET: 50; 8.6 TABLET ORAL at 20:39

## 2025-04-05 RX ADMIN — SENNOSIDES AND DOCUSATE SODIUM 1 TABLET: 50; 8.6 TABLET ORAL at 09:03

## 2025-04-05 RX ADMIN — LEVOTHYROXINE SODIUM 112 MCG: 112 TABLET ORAL at 06:40

## 2025-04-05 RX ADMIN — OXYCODONE HYDROCHLORIDE 2.5 MG: 5 TABLET ORAL at 21:24

## 2025-04-05 RX ADMIN — ACETAMINOPHEN 975 MG: 325 TABLET ORAL at 20:39

## 2025-04-05 RX ADMIN — ATENOLOL 25 MG: 25 TABLET ORAL at 20:38

## 2025-04-05 RX ADMIN — ACETAMINOPHEN 975 MG: 325 TABLET ORAL at 12:54

## 2025-04-05 RX ADMIN — ACETAMINOPHEN 975 MG: 325 TABLET ORAL at 04:17

## 2025-04-05 RX ADMIN — APIXABAN 10 MG: 5 TABLET, FILM COATED ORAL at 09:03

## 2025-04-05 RX ADMIN — AMITRIPTYLINE HYDROCHLORIDE 25 MG: 25 TABLET, FILM COATED ORAL at 21:24

## 2025-04-05 RX ADMIN — OXYCODONE HYDROCHLORIDE 2.5 MG: 5 TABLET ORAL at 09:08

## 2025-04-05 RX ADMIN — ATORVASTATIN CALCIUM 10 MG: 10 TABLET, FILM COATED ORAL at 09:03

## 2025-04-05 RX ADMIN — PERFLUTREN 10 ML: 6.52 INJECTION, SUSPENSION INTRAVENOUS at 09:10

## 2025-04-05 RX ADMIN — HYDROXYZINE HYDROCHLORIDE 10 MG: 10 TABLET ORAL at 11:22

## 2025-04-05 RX ADMIN — PANTOPRAZOLE SODIUM 40 MG: 40 TABLET, DELAYED RELEASE ORAL at 06:40

## 2025-04-05 RX ADMIN — OXYCODONE HYDROCHLORIDE 5 MG: 5 TABLET ORAL at 14:27

## 2025-04-05 RX ADMIN — APIXABAN 10 MG: 5 TABLET, FILM COATED ORAL at 20:39

## 2025-04-05 ASSESSMENT — ACTIVITIES OF DAILY LIVING (ADL)
ADLS_ACUITY_SCORE: 45
ADLS_ACUITY_SCORE: 41
ADLS_ACUITY_SCORE: 45
ADLS_ACUITY_SCORE: 41
ADLS_ACUITY_SCORE: 41
ADLS_ACUITY_SCORE: 45
ADLS_ACUITY_SCORE: 41
ADLS_ACUITY_SCORE: 45
ADLS_ACUITY_SCORE: 41

## 2025-04-05 NOTE — PLAN OF CARE
Goal Outcome Evaluation:       Patient vital signs are at baseline: Yes  Patient able to ambulate as they were prior to admission or with assist devices provided by therapies during their stay:  Yes  Patient MUST void prior to discharge:  Yes  Patient able to tolerate oral intake:  Yes  Pain has adequate pain control using Oral analgesics:  Yes  Does patient have an identified :  Yes  Has goal D/C date and time been discussed with patient:  Yes    Dressing CDI. Pain managed with oxy. Assist of 1 with walker. Possible discharge home tomorrow.

## 2025-04-05 NOTE — PROGRESS NOTES
[facility]    Medicine Progress Note - Hospitalist Service    Date of Admission:  4/3/2025    Assessment & Plan     Liliane Romo is an 79-year-old female with history of hyperlipidemia, hypothyroidism, migraine headaches, GERD, who underwent R total knee arthroplasty on 4/3/2023. Hospital medicine following for management of medical conditions.     S/p right total knee arthroplasty, Dr. Fowler, 4/3/2025, for advanced osteoarthritis  -Doing well postoperatively initially.  Surgical management per orthopedics  -Continue pain control.  PT.      Acute blood loss anemia due to surgical blood loss   -Hb 12 -> 10.8.  Monitor    Syncope 4/4/2025-likely vasovagal  -Patient had syncopal episode on 4/4 (postop day 1) after she underwent PT.  She was hypotensive, bradycardic, lethargic.  She had similar episode during her previous knee replacement 2 years ago.  -Workup showed small PE which is unlikely to explain syncope.  Suspect syncope was likely vasovagal  -Continue to monitor.  No recurrence since then    Small segmental CECELIA pulmonary embolism without right heart strain, 4/4/2024   -CT PE obtained due to episode of syncope and elevated troponin.  Showed small segmental pulmonary embolism in left upper lobe.  -Echo 4/5 showed normal LVEF of 65-70%, normal RV size and function.  No significant valve abnormality.  Improved PA pressure compared to previous echo in 2023  -Started on anticoagulation with apixaban.  Continue    Elevated troponin-likely due to demand ischemia   -Troponin obtained following episode of syncope was minimally elevated at 17.  Subsequent check was in normal range at 13.  Slight troponin elevation is secondary to demand ischemia.  -EKG showed sinus bradycardia with RBBB, rate 59.  -Echo showed no WMA     Mild GRACIE - likely due to hypotension  -Creatinine increased from 0.73 -> 1.05 on 4/4.  This is likely secondary to hypotension and fluid shifts postoperatively.  Creatinine  "improved to 0.66 on 4/5.  Mild GRACIE has resolved      Hypocalcemia  -Ionized calcium 3.9.  Replace as needed     Hyperlipidemia  -Continue atorvastatin 10 mg daily     Hypothyroidism  -TSH within normal limits.  Continue levothyroxine 125 mcg daily     GERD  -Continue PPI     Chronic migraine headaches  -Continue atenolol and amitriptyline.  Patient reports with atenolol and amitriptyline, her migraine headaches have been well-controlled with no current issues.     Moderate pulmonary hypertension on echo in 2023.  Resolved in 2025  -Echo 7/2023 was obtained when patient had presyncope after left knee arthroplasty.  Echo showed hyperdynamic LV function of 70%, elevated RV pressure consistent with pulmonary hypertension  -Follow-up echo 4/5/2025 showed no pulmonary hypertension                Diet: Advance Diet as Tolerated: Regular Diet Adult  Discharge Instruction - Regular Diet Adult    DVT Prophylaxis: DOAC  Hernández Catheter: Not present  Lines: None     Cardiac Monitoring: ACTIVE order. Indication: Syncope- low cardiac risk (24 hours)  Code Status: Full Code      Clinically Significant Risk Factors Present on Admission           # Hypocalcemia: Lowest Ca = 7.5 mg/dL in last 2 days, will monitor and replace as appropriate     # Hypoalbuminemia: Lowest albumin = 3.3 g/dL at 4/4/2025 11:08 AM, will monitor as appropriate          # Anemia: based on hgb <11       # Obesity: Estimated body mass index is 38.16 kg/m  as calculated from the following:    Height as of this encounter: 1.651 m (5' 5\").    Weight as of this encounter: 104 kg (229 lb 4.8 oz).              Social Drivers of Health            Disposition Plan         Medically Ready for Discharge: Anticipated Tomorrow    Plan discussed with AVINASH Whitfield MD  Hospitalist Service  [facility]  Securely message with [employer] (more info)  Text page via [employer] Paging/Directory "   ______________________________________________________________________    Interval History     Patient sitting in chair.   present in room.  Patient reports she is doing okay.  Denies dizziness, chest pain, shortness of breath.  Pain is controlled.  No further episode of syncope    Physical Exam   Vital Signs: Temp: 98.8  F (37.1  C) Temp src: Oral BP: 123/49 Pulse: 69   Resp: 16 SpO2: 96 % O2 Device: None (Room air) Oxygen Delivery: 2 LPM  Weight: 229 lbs 4.8 oz    Constitutional - awake and alert, in no acute distress  Cardiovascular - regular rate and rhythm, no murmurs, no edema  Pulmonary - lungs are clear to auscultation bilaterally, no wheezing or rhonchi  GI - abdomen is soft, nontende  Neurological - awake, alert, Moving all 4 extremities, normal speech, no focal deficits    Medical Decision Making       53 MINUTES SPENT BY ME on the date of service doing chart review, history, exam, documentation & further activities per the note.      Data     I have personally reviewed the following data over the past 24 hrs:    N/A  \   10.8 (L)   / N/A     140 104 16.8 /  161 (H); 161 (H)   3.5 25 0.66 \     Trop: 13 BNP: N/A       Imaging results reviewed over the past 24 hrs:   Recent Results (from the past 24 hours)   US Lower Extremity Venous Duplex Bilateral    Narrative    EXAM: US LOWER EXTREMITY VENOUS DUPLEX BILATERAL  LOCATION: [facility]  DATE: 4/4/2025    INDICATION: PE found on CTPA , assess for clot burden  COMPARISON: None.  TECHNIQUE: Venous Duplex ultrasound of bilateral lower extremities with and without compression, augmentation and duplex. Color flow and spectral Doppler with waveform analysis performed.    FINDINGS: Exam includes the common femoral, femoral, popliteal veins as well as segmentally visualized deep calf veins and greater saphenous vein. Bilateral calf veins are not well-visualized due to edema and overlying bandage on the right.    RIGHT: No deep  vein thrombosis. No superficial thrombophlebitis. No popliteal cyst.    LEFT: No deep vein thrombosis. No superficial thrombophlebitis. No popliteal cyst.      Impression    IMPRESSION:  1.  No deep venous thrombosis in the visualized portions of the bilateral lower extremities.   Echocardiogram Complete   Result Value    LVEF  65-70%    [facility]    [id number]  [id number]  [id number]  [id number]^MRAS^VIC^CECILE     [facility]  Echocardiography Laboratory  [facility] [place] [address]     Name: CYNTHIA PERRY  MRN: [id number]  : 1945  Study Date: 2025 07:48 AM  Age: 79 yrs  Gender: Female  Patient Location: [facility]  Reason For Study: Syncope  Ordering Physician: VIC CREWS  Referring Physician: VIC CREWS  Performed By: Zion Torres     BSA: 2.1 m2  Height: 65 in  Weight: 229 lb  HR: 74  BP: 88/41 mmHg  ______________________________________________________________________________  Procedure  Echocardiogram with two-dimensional, color and spectral Doppler. Definity (NDC  #35574-201) given intravenously.  ______________________________________________________________________________  Interpretation Summary     Left ventricular systolic function is normal. The visual ejection fraction is  65-70%.  The right ventricle is normal in size and function.  No hemodynamically significant valvular abnormality.  IVC diameter <2.1 cm collapsing >50% with sniff suggests RA pressure of 3  mmHg.     In comparison to echo from 23, Estimated pulmonary pressure is lower  (RVSP was 54mmHg).  ______________________________________________________________________________  Left Ventricle  The left ventricle is normal in size. Left ventricular systolic function is  normal. The visual ejection fraction is 65-70%. No regional wall motion  abnormalities noted.     Right Ventricle  The right ventricle is normal in size and function.     Atria  The left atrium is mildly dilated. Right  atrial size is normal.     Mitral Valve  There is trace mitral regurgitation. The mean mitral valve gradient is 3.0  mmHg. No significant mitral stenosis.     Tricuspid Valve  The tricuspid valve is not well visualized, but is grossly normal. The right  ventricular systolic pressure is approximated at 19.0 mmHg plus the right  atrial pressure. There is trace tricuspid regurgitation.     Aortic Valve  The aortic valve is not well visualized. No hemodynamically significant  valvular aortic stenosis.     Pulmonic Valve  The pulmonic valve is not well seen, but is grossly normal.     Vessels  Normal size ascending aorta.     Pericardium  There is no pericardial effusion.     Rhythm  Sinus rhythm was noted.  ______________________________________________________________________________  MMode/2D Measurements & Calculations  IVSd: 1.0 cm     LVIDd: 4.1 cm  LVIDs: 2.8 cm  LVPWd: 1.1 cm  FS: 31.7 %  LV mass(C)d: 141.5 grams  LV mass(C)dI: 67.6 grams/m2  asc Aorta Diam: 2.9 cm  LVOT diam: 2.2 cm  LVOT area: 3.8 cm2  Asc Ao diam index BSA (cm/m2): 1.4  Asc Ao diam index Ht(cm/m): 1.8  LA Volume (BP): 80.3 ml  LA Volume Index (BP): 38.2 ml/m2  RWT: 0.54     Doppler Measurements & Calculations  MV E max jayson: 100.0 cm/sec  MV A max jayson: 104.0 cm/sec  MV E/A: 0.96  MV max P.2 mmHg  MV mean PG: 3.0 mmHg  MV V2 VTI: 40.7 cm  MVA(VTI): 2.6 cm2  MV dec slope: 369.0 cm/sec2  MV dec time: 0.27 sec  Ao V2 max: 205.0 cm/sec  Ao max P.0 mmHg  Ao V2 mean: 142.0 cm/sec  Ao mean P.0 mmHg  Ao V2 VTI: 42.5 cm  YI(I,D): 2.5 cm2  YI(V,D): 2.3 cm2  LV V1 max P.3 mmHg  LV V1 max: 125.0 cm/sec  LV V1 VTI: 27.8 cm  SV(LVOT): 105.7 ml  SI(LVOT): 50.4 ml/m2  PA acc time: 0.07 sec  TR max jayson: 214.0 cm/sec  TR max P.0 mmHg  AV Jayson Ratio (DI): 0.61  YI Index (cm2/m2): 1.2  E/E' av.6  Lateral E/e': 9.3  Medial E/e': 7.9  RV S Jayson: 20.1 cm/sec      ______________________________________________________________________________  Report approved by: MD Nely Arceo on 04/05/2025 10:16 AM

## 2025-04-05 NOTE — PLAN OF CARE
Goal Outcome Evaluation:    Overall Patient Progress: Improving    Date/Time 4/4/2025    Trauma/Ortho/Medical (Choose one) Ortho    Diagnosis: R TKA  POD#: 2  Mental Status: A&O x4  Activity/dangle: Ax1 GB/walker  Diet: regular  Pain: Scheduled tylenol and PRN Oxy  Hernández/Voiding: External cath  Tele/Restraints/Iso: Telemetry  02/LDA: NAHIDE SCOTT SL  D/C Date: Possibly today  Other Info: Dressing CDI. CMS intact

## 2025-04-05 NOTE — PROGRESS NOTES
"Orthopedic Surgery  4/5/2025  POD #2    S: events of yesterday noted,   now Dx with PE.    O: Blood pressure 123/49, pulse 69, temperature 98.8  F (37.1  C), temperature source Oral, resp. rate 16, height 1.651 m (5' 5\"), weight 104 kg (229 lb 4.8 oz), SpO2 96%.  Lab Results   Component Value Date    HGB 10.8 04/05/2025     Neurovascularly intact.  Calves are negative bilaterally, both soft and nontender.  The dressing is C/D/I.    A: Ms. Romo is doing well status post Procedure(s):  RIGHT TOTAL KNEE ARTHROPLASTY.    P: No dressing change, patient to remove outer dressing on POD3, leaving mesh adhesive in place.  Continue physical therapy. Discharge to home tomorrow, monitor today.    Coy Burton  [phone]    "

## 2025-04-06 ENCOUNTER — APPOINTMENT (OUTPATIENT)
Dept: PHYSICAL THERAPY | Facility: CLINIC | Age: 80
DRG: 469 | End: 2025-04-06
Attending: ORTHOPAEDIC SURGERY
Payer: COMMERCIAL

## 2025-04-06 VITALS
WEIGHT: 229.3 LBS | BODY MASS INDEX: 38.2 KG/M2 | SYSTOLIC BLOOD PRESSURE: 145 MMHG | RESPIRATION RATE: 16 BRPM | HEART RATE: 65 BPM | TEMPERATURE: 98.3 F | OXYGEN SATURATION: 97 % | DIASTOLIC BLOOD PRESSURE: 70 MMHG | HEIGHT: 65 IN

## 2025-04-06 LAB — HGB BLD-MCNC: 11 G/DL (ref 11.7–15.7)

## 2025-04-06 PROCEDURE — 97110 THERAPEUTIC EXERCISES: CPT | Mod: GP | Performed by: PHYSICAL THERAPY ASSISTANT

## 2025-04-06 PROCEDURE — 99232 SBSQ HOSP IP/OBS MODERATE 35: CPT | Performed by: INTERNAL MEDICINE

## 2025-04-06 PROCEDURE — 97116 GAIT TRAINING THERAPY: CPT | Mod: GP | Performed by: PHYSICAL THERAPY ASSISTANT

## 2025-04-06 PROCEDURE — 250N000013 HC RX MED GY IP 250 OP 250 PS 637: Performed by: NURSE PRACTITIONER

## 2025-04-06 PROCEDURE — 97530 THERAPEUTIC ACTIVITIES: CPT | Mod: GP | Performed by: PHYSICAL THERAPY ASSISTANT

## 2025-04-06 PROCEDURE — 250N000013 HC RX MED GY IP 250 OP 250 PS 637: Performed by: INTERNAL MEDICINE

## 2025-04-06 PROCEDURE — 250N000013 HC RX MED GY IP 250 OP 250 PS 637: Performed by: ORTHOPAEDIC SURGERY

## 2025-04-06 PROCEDURE — 36415 COLL VENOUS BLD VENIPUNCTURE: CPT | Performed by: INTERNAL MEDICINE

## 2025-04-06 PROCEDURE — 85018 HEMOGLOBIN: CPT | Performed by: INTERNAL MEDICINE

## 2025-04-06 RX ORDER — APIXABAN 5 MG (74)
KIT ORAL
Qty: 70 EACH | Refills: 0 | Status: SHIPPED | OUTPATIENT
Start: 2025-04-06 | End: 2025-05-06

## 2025-04-06 RX ADMIN — APIXABAN 10 MG: 5 TABLET, FILM COATED ORAL at 08:25

## 2025-04-06 RX ADMIN — LEVOTHYROXINE SODIUM 112 MCG: 112 TABLET ORAL at 06:35

## 2025-04-06 RX ADMIN — SENNOSIDES AND DOCUSATE SODIUM 1 TABLET: 50; 8.6 TABLET ORAL at 08:25

## 2025-04-06 RX ADMIN — OXYCODONE HYDROCHLORIDE 5 MG: 5 TABLET ORAL at 09:44

## 2025-04-06 RX ADMIN — ACETAMINOPHEN 975 MG: 325 TABLET ORAL at 05:23

## 2025-04-06 RX ADMIN — HYDROXYZINE HYDROCHLORIDE 10 MG: 10 TABLET ORAL at 11:36

## 2025-04-06 RX ADMIN — PANTOPRAZOLE SODIUM 40 MG: 40 TABLET, DELAYED RELEASE ORAL at 06:35

## 2025-04-06 RX ADMIN — ATORVASTATIN CALCIUM 10 MG: 10 TABLET, FILM COATED ORAL at 08:25

## 2025-04-06 ASSESSMENT — ACTIVITIES OF DAILY LIVING (ADL)
ADLS_ACUITY_SCORE: 38

## 2025-04-06 NOTE — PROGRESS NOTES
[facility]    Medicine Progress Note - Hospitalist Service    Date of Admission:  4/3/2025    Assessment & Plan     Liliane Romo is an 79-year-old female with history of hyperlipidemia, hypothyroidism, migraine headaches, GERD, who underwent R total knee arthroplasty on 4/3/2023. Hospital medicine following for management of medical conditions.    On 4/4, patient had episode of syncope after completing PT.  This was likely vasovagal.  Extensive workup showed small pulmonary embolism for which she was started on apixaban.     S/p right total knee arthroplasty, Dr. Fowler, 4/3/2025, for advanced osteoarthritis  -Doing well postoperatively initially.  Surgical management per orthopedics  -Continue pain control.  PT.    -Discharge as per orthopedics    Acute blood loss anemia due to surgical blood loss   -Hb 12 -> 11.  Hemoglobin stable     Syncope 4/4/2025-likely vasovagal  -Patient had syncopal episode on 4/4 (postop day 1) after she underwent PT.  She was hypotensive, bradycardic, lethargic.  She had similar episode during her previous knee replacement 2 years ago.  -Workup showed small PE which is unlikely to explain syncope.  Suspect syncope was likely vasovagal.  No recurrence of syncope.    Small segmental CECELIA pulmonary embolism without right heart strain, 4/4/2024   -CT PE obtained due to episode of syncope and elevated troponin.  Showed small segmental pulmonary embolism in left upper lobe.  -Echo 4/5 showed normal LVEF of 65-70%, normal RV size and function.  No significant valve abnormality.  Improved PA pressure compared to previous echo in 2023  -Started on anticoagulation with apixaban.  Continue    Elevated troponin-likely due to demand ischemia   -Troponin obtained following episode of syncope was minimally elevated at 17.  Subsequent check was in normal range at 13.  Slight troponin elevation is secondary to demand ischemia.  -EKG showed sinus bradycardia with RBBB, rate  "59.  -Echo showed no WMA     Mild GRACIE - likely due to hypotension  -Creatinine increased from 0.73 -> 1.05 on 4/4.  This is likely secondary to hypotension and fluid shifts postoperatively.  Creatinine improved to 0.66 on 4/5.  Mild GRACIE resolved      Hypocalcemia  -Ionized calcium 3.9.  Replace as needed     Hyperlipidemia  -Continue atorvastatin 10 mg daily     Hypothyroidism  -TSH within normal limits.  Continue levothyroxine 125 mcg daily     GERD  -Continue PPI     Chronic migraine headaches  -Continue atenolol and amitriptyline.  Patient reports with atenolol and amitriptyline, her migraine headaches have been well-controlled with no current issues.     Moderate pulmonary hypertension on echo in 2023.  Resolved in 2025  -Echo 7/2023 was obtained when patient had presyncope after left knee arthroplasty.  Echo showed hyperdynamic LV function of 70%, elevated RV pressure consistent with pulmonary hypertension  -Follow-up echo 4/5/2025 showed no pulmonary hypertension                Diet: Advance Diet as Tolerated: Regular Diet Adult  Discharge Instruction - Regular Diet Adult    DVT Prophylaxis: DOAC  Hernández Catheter: Not present  Lines: None     Cardiac Monitoring: ACTIVE order. Indication: Syncope- low cardiac risk (24 hours)  Code Status: Full Code      Clinically Significant Risk Factors           # Hypocalcemia: Lowest iCa = 3.9 mg/dL in last 2 days, will monitor and replace as appropriate     # Hypoalbuminemia: Lowest albumin = 3.3 g/dL at 4/4/2025 11:08 AM, will monitor as appropriate                  # Obesity: Estimated body mass index is 38.16 kg/m  as calculated from the following:    Height as of this encounter: 1.651 m (5' 5\").    Weight as of this encounter: 104 kg (229 lb 4.8 oz)., PRESENT ON ADMISSION            Social Drivers of Health            Disposition Plan         Medically Ready for Discharge: Anticipated Today-discharge as per orthopedic    Plan discussed with AVINASH Whitfield, " MD  Hospitalist Service  [facility]  Securely message with [employer] (more info)  Text page via [employer] Paging/Directory   ______________________________________________________________________    Interval History     Patient sitting in chair.   present in room.  Patient reports she is doing okay.  Denies dizziness, chest pain, shortness of breath.  Pain is controlled.  No further episode of syncope  Discussed anticoagulation with patient and .  Emphasized that she needs to get in touch with her PCP for ongoing prescriptions as she would need to be anticoagulated for 3-6 months.  Also discussed that she should avoid all NSAIDs while on anticoagulation.    Physical Exam   Vital Signs: Temp: 98.3  F (36.8  C) Temp src: Oral BP: (!) 145/70 Pulse: 65   Resp: 16 SpO2: 97 % O2 Device: None (Room air)    Weight: 229 lbs 4.8 oz    Constitutional - awake and alert, in no acute distress  Cardiovascular - regular rate and rhythm, no murmurs, no edema  Pulmonary - lungs are clear to auscultation bilaterally, no wheezing or rhonchi  GI - abdomen is soft, nontender  Neurological - awake, alert, Moving all 4 extremities, normal speech, no focal deficits    Medical Decision Making       40 MINUTES SPENT BY ME on the date of service doing chart review, history, exam, documentation & further activities per the note.      Data     I have personally reviewed the following data over the past 24 hrs:    N/A  \   11.0 (L)   / N/A     N/A N/A N/A /  N/A   N/A N/A N/A \       Imaging results reviewed over the past 24 hrs:   No results found for this or any previous visit (from the past 24 hours).

## 2025-04-06 NOTE — PROGRESS NOTES
"Orthopedic Surgery  4/6/2025  POD #3    S: Patient voices no complaints today.     O: Blood pressure (!) 145/70, pulse 65, temperature 98.3  F (36.8  C), temperature source Oral, resp. rate 16, height 1.651 m (5' 5\"), weight 104 kg (229 lb 4.8 oz), SpO2 97%.  Lab Results   Component Value Date    HGB 11.0 04/06/2025     Neurovascularly intact.  Calves are negative bilaterally, both soft and nontender.  The dressing is C/D/I.    A: Ms. Romo is doing well status post Procedure(s):  RIGHT TOTAL KNEE ARTHROPLASTY.    P: No dressing change, patient to remove outer dressing on POD3, leaving mesh adhesive in place.  Continue physical therapy. Discharge to home today.    Coy Burton  [phone]    "

## 2025-04-06 NOTE — PLAN OF CARE
Goal Outcome Evaluation:    Overall Patient Progress: Improving     Date/Time 4/5/2025 [id number]    Trauma/Ortho/Medical (Choose one) Ortho    Diagnosis: R TKA  POD#: 3  Mental Status: A&O x4  Activity/dangle: Ax1 GB/walker  Diet: regular  Pain: Scheduled tylenol, PRN oxy  Hernández/Voiding: bathroom  Tele/Restraints/Iso: telemetry  02/LDA: RUE PIV SL  D/C Date: Possibly today  Other Info: Dressing CDI. CMS intact.

## 2025-04-06 NOTE — PLAN OF CARE
Goal Outcome Evaluation:       Patient vital signs are at baseline: Yes  Patient able to ambulate as they were prior to admission or with assist devices provided by therapies during their stay:  Yes  Patient MUST void prior to discharge:  Yes  Patient able to tolerate oral intake:  Yes  Pain has adequate pain control using Oral analgesics:  Yes  Does patient have an identified :  Yes  Has goal D/C date and time been discussed with patient:  Yes    Dressing CDI. Pain managed with oxy. Assist of 1 with walker. Discharge instruction went over with pt, verbalized understanding. Will discharge home with family.

## 2025-04-06 NOTE — DISCHARGE INSTRUCTIONS
Do not take NSAIDs such as ibuprofen, naproxen or, Mobic while on anticoagulation with apixaban.  Please contact your primary care provider within 30 days to renew prescription for Eliquis.

## 2025-04-11 ENCOUNTER — ANCILLARY PROCEDURE (OUTPATIENT)
Dept: ULTRASOUND IMAGING | Facility: CLINIC | Age: 80
End: 2025-04-11
Attending: ORTHOPAEDIC SURGERY
Payer: COMMERCIAL

## 2025-04-11 DIAGNOSIS — M79.669 CALF PAIN: ICD-10-CM

## 2025-04-11 DIAGNOSIS — M79.89 LEG SWELLING: ICD-10-CM

## 2025-04-11 PROCEDURE — 93971 EXTREMITY STUDY: CPT | Mod: RT

## 2025-04-28 NOTE — DISCHARGE SUMMARY
Physician Discharge Summary     Patient ID:  Liliane Ulrichyt  [id number]  80 year old  1945    Admit date: 4/3/2025    Discharge date and time: 4/6/2025 12:39 PM     Admitting Physician: Coy Fowler MD     Discharge Physician: Coy Fowler MD     Admission Diagnoses: Degenerative arthritis of right knee [M17.11]  Status post right knee replacement [Z96.651]  Status post total left knee replacement [Z96.652]    Discharge Diagnoses: s/p right total knee arthroplasty    Indication for Admission:  s/p right total knee arthroplasty    Hospital Course: no events POD0. Patient had an RRT called on postoperative day #1 for hypotension, lethargy, syncope, found to have small segmental PE left upper lobe. BLE venous duplex ultrasounds were negative for DVT. Patient was started on eliquis 10 mg bid for 7 days, then 5 mg daily for a 3 month duration for small segmental pulmonary embolism.       Significant Diagnostic Studies: radiology:   CT scan: 4/4/2025   IMPRESSION:  1.  Small segmental pulmonary embolism in the left upper lobe. No other pulmonary emboli. No right heart strain.  2.  Linear opacities in the lower lobes at the lung bases favored to be atelectasis, less likely pneumonia.    Ultrasound: BLE 4/4/2025  IMPRESSION:  1.  No deep venous thrombosis in the visualized portions of the bilateral lower extremities.    Discharge Exam: please see progress note for physical exam.     Disposition: home    Patient Instructions:   Discharge Medication List as of 4/6/2025 12:00 PM        START taking these medications    Details   !! acetaminophen (TYLENOL) 325 MG tablet Take 3 tablets (975 mg) by mouth every 6 hours as needed for mild pain., Disp-100 tablet, R-0, E-Prescribe      Apixaban Starter Pack (ELIQUIS DVT/PE STARTER PACK) 5 MG TBPK Take 10 mg by mouth 2 times daily for 5 days, THEN 5 mg 2 times daily for 25 days. Continue as directed by provider., Disp-70 each, R-0, E-Prescribe      oxyCODONE (ROXICODONE)  5 MG tablet Take 1 tablet (5 mg) by mouth every 6 hours as needed for pain. NOT TO EXCEED 6 TABLETS IN 1 DAY, Disp-30 tablet, R-0, E-Prescribe      !! senna-docusate (SENOKOT-S/PERICOLACE) 8.6-50 MG tablet Take 1-2 tablets by mouth 2 times daily. Take while on oral narcotics to prevent or treat constipation., Disp-30 tablet, R-0, E-PrescribeWhile taking narcotics       !! - Potential duplicate medications found. Please discuss with provider.        CONTINUE these medications which have NOT CHANGED    Details   !! acetaminophen (TYLENOL) 325 MG tablet Take 3 tablets (975 mg) by mouth every 6 hours as needed for mild pain, Disp-100 tablet, R-0, E-Prescribe      amitriptyline (ELAVIL) 25 MG tablet Take 25 mg by mouth at bedtime., Historical      atenolol (TENORMIN) 25 MG tablet Take 25 mg by mouth every evening., Historical      atorvastatin (LIPITOR) 20 MG tablet Take 0.5 tablets by mouth daily (0.5 x 20 mg = 10 mg), Historical      calcium citrate (CITRACAL) 950 (200 Ca) MG tablet Take 1 tablet by mouth 3 times daily, Historical      cholecalciferol (VITAMIN D3) 10 mcg (400 units) TABS tablet Take 1 tablet by mouth 3 times daily., Historical      levothyroxine (SYNTHROID/LEVOTHROID) 112 MCG tablet Take 1 tablet by mouth every morning (before breakfast). Brand Synthroid, Historical      omeprazole (PRILOSEC) 20 MG DR capsule Take 20 mg by mouth daily., Historical      !! senna-docusate (SENOKOT-S/PERICOLACE) 8.6-50 MG tablet Take 1-2 tablets by mouth 2 times daily Take while on oral narcotics to prevent or treat constipation., Disp-30 tablet, R-0, E-PrescribeWhile taking narcotics       !! - Potential duplicate medications found. Please discuss with provider.        STOP taking these medications       ibuprofen (ADVIL/MOTRIN) 600 MG tablet Comments:   Reason for Stopping:                 Signed:  Coy Fowler MD  4/28/2025  2:43 PM

## 2025-05-03 ENCOUNTER — HEALTH MAINTENANCE LETTER (OUTPATIENT)
Age: 80
End: 2025-05-03

## 2025-05-15 ENCOUNTER — TELEPHONE (OUTPATIENT)
Dept: WOUND CARE | Facility: CLINIC | Age: 80
End: 2025-05-15
Payer: COMMERCIAL

## 2025-05-15 NOTE — TELEPHONE ENCOUNTER
Does the patient have an open and draining wound? Yes    Please schedule with Yarelis Horne NP, Jose Rivera M.D., or Jerod Salazar M.D. at [facility] [place] for next available appointment.    **For all providers, please schedule a follow up 4 weeks after initial appointment. (2-3 weeks for Dr. Gill and Dr. Colmenares)**  --If unable to schedule within 2-3 weeks then please place on cancellation list--    Is the patient able to make their own medical decisions? Yes    Can the patient be scheduled on a Thursday (Marie) (if scheduling prior to June 5th)? Yes    Is patient a [facility] lift? PLEASE INQUIRE WHEN MAKING THE APPOINTMENT AND PUT IN APPOINTMENT NOTES    Routing to  Wound Healing Scheduling.

## 2025-05-15 NOTE — TELEPHONE ENCOUNTER
Dr Fowler from [facility] referring patient for non healing surgical wound.  Patient had knee replacement.  AMB.  OK to schedule?  Radha @ [facility] [phone]

## 2025-05-19 ENCOUNTER — HOSPITAL ENCOUNTER (OUTPATIENT)
Dept: WOUND CARE | Facility: CLINIC | Age: 80
Discharge: HOME | End: 2025-05-19
Attending: SURGERY | Admitting: SURGERY
Payer: COMMERCIAL

## 2025-05-19 VITALS
DIASTOLIC BLOOD PRESSURE: 82 MMHG | WEIGHT: 224 LBS | SYSTOLIC BLOOD PRESSURE: 120 MMHG | HEIGHT: 65 IN | TEMPERATURE: 97.5 F | BODY MASS INDEX: 37.32 KG/M2 | HEART RATE: 85 BPM

## 2025-05-19 DIAGNOSIS — L97.812 SKIN ULCER OF RIGHT KNEE WITH FAT LAYER EXPOSED (H): Primary | ICD-10-CM

## 2025-05-19 PROCEDURE — 11042 DBRDMT SUBQ TIS 1ST 20SQCM/<: CPT | Performed by: SURGERY

## 2025-05-19 PROCEDURE — G0463 HOSPITAL OUTPT CLINIC VISIT: HCPCS | Mod: 25

## 2025-05-19 PROCEDURE — 99203 OFFICE O/P NEW LOW 30 MIN: CPT | Mod: 25 | Performed by: SURGERY

## 2025-05-19 NOTE — PROGRESS NOTES
[facility] Progress Note    Subject: Liliane Romo comes to see us today for evaluation of her right distal knee incision.  She underwent a right total knee replacement on 4/3/2025 as an outpatient with Dr. Coy Fowler no apparent complications.  Noticed the distal 2 cm of the wound is slightly broken down and there is been some mildly cloudy drainage.  No pain.    Using EdemaWear on both of her legs to control the swelling.  No fever or chills.  Has had some mild redness around the entire knee incision since surgery and unchanged.    Replacement done for osteoarthritis.  Previous successful left knee replacement 2 years ago.  Never smoked.  Postsurgical hypothyroidism on replacement therapy.  History of PAD    PMH:   Past Medical History:   Diagnosis Date    Arthritis     BPPV (benign paroxysmal positional vertigo)     Complex tear of medial meniscus of right knee     De Quervain's tenosynovitis, left     Diverticulosis     DJD (degenerative joint disease) of knee, bilateral     Esophageal stricture     Dilation x 2    Goiter     History of hormone replacement therapy     HLD (hyperlipidemia)     Hypertension     Hypoparathyroidism     Malignant neoplasm of thyroid gland (H)     Migraine     Obese     Plantar neuroma of right foot     Postablative hypothyroidism     Thyroid disease     Tibial plateau fracture, left     Tremor of right hand      Patient Active Problem List   Diagnosis    Status post total knee replacement, left    Status post total left knee replacement    Status post total knee replacement, right    Status post right knee replacement    Skin ulcer of right knee with fat layer exposed (H)     Social Hx:   Social History     Socioeconomic History    Marital status:      Spouse name: Not on file    Number of children: Not on file    Years of education: Not on file    Highest education level: Not on file   Occupational History    Not on file   Tobacco Use     Smoking status: Never    Smokeless tobacco: Never   Substance and Sexual Activity    Alcohol use: Not Currently     Comment: 1 glass wine per week    Drug use: Not on file    Sexual activity: Not on file   Other Topics Concern    Not on file   Social History Narrative    Not on file     Social Drivers of Health     Financial Resource Strain: Not on file   Food Insecurity: Not on file   Transportation Needs: Not on file   Physical Activity: Not on file   Stress: Not on file   Social Connections: Not on file   Interpersonal Safety: Low Risk  (5/19/2025)    Interpersonal Safety     Do you feel physically and emotionally safe where you currently live?: Yes     Within the past 12 months, have you been hit, slapped, kicked or otherwise physically hurt by someone?: No     Within the past 12 months, have you been humiliated or emotionally abused in other ways by your partner or ex-partner?: No   Housing Stability: Not on file       Surgical Hx:   Past Surgical History:   Procedure Laterality Date    ARTHROPLASTY KNEE Left 07/21/2023    Procedure: left total knee arthroplasty;  Surgeon: Coy Fowler MD;  Location: [facility] OR    ARTHROPLASTY KNEE Right 4/3/2025    Procedure: RIGHT TOTAL KNEE ARTHROPLASTY;  Surgeon: Coy Fowler MD;  Location: [facility] OR    CATARACT REMOVAL WITH IMPLANT  Bilateral 2021    CERVICAL POLYPECTOMY  11/2012    [facility] SURGICAL PATHOLOGY Left     LEFT THYROID LOBECTOMY AND ISTHMECTOMYAND ABLATION    ESOPHAGEAL DILATATION       X2    GYN SURGERY      tubal ligation    IR ESOPHAGEAL STENTING      x2    THYROIDECTOMY   03/2008    THYROIDECTOMY, PARTIAL  01/2008    TUBAL LIGATION      WISDOM TEETH EXTRACTION         Allergies:  No Known Allergies    Medications:   Current Outpatient Medications   Medication Sig Dispense Refill    acetaminophen (TYLENOL) 325 MG tablet Take 3 tablets (975 mg) by mouth every 6 hours as needed for mild pain. 100 tablet 0    acetaminophen (TYLENOL) 325 MG tablet Take 3 tablets  "(975 mg) by mouth every 6 hours as needed for mild pain 100 tablet 0    amitriptyline (ELAVIL) 25 MG tablet Take 25 mg by mouth at bedtime.      atenolol (TENORMIN) 25 MG tablet Take 25 mg by mouth every evening.      atorvastatin (LIPITOR) 20 MG tablet Take 0.5 tablets by mouth daily (0.5 x 20 mg = 10 mg)      calcium citrate (CITRACAL) 950 (200 Ca) MG tablet Take 1 tablet by mouth 3 times daily      cholecalciferol (VITAMIN D3) 10 mcg (400 units) TABS tablet Take 1 tablet by mouth 3 times daily.      levothyroxine (SYNTHROID/LEVOTHROID) 112 MCG tablet Take 1 tablet by mouth every morning (before breakfast). Brand Synthroid      omeprazole (PRILOSEC) 20 MG DR capsule Take 20 mg by mouth daily.      oxyCODONE (ROXICODONE) 5 MG tablet Take 1 tablet (5 mg) by mouth every 6 hours as needed for pain. NOT TO EXCEED 6 TABLETS IN 1 DAY 30 tablet 0    senna-docusate (SENOKOT-S/PERICOLACE) 8.6-50 MG tablet Take 1-2 tablets by mouth 2 times daily. Take while on oral narcotics to prevent or treat constipation. 30 tablet 0    senna-docusate (SENOKOT-S/PERICOLACE) 8.6-50 MG tablet Take 1-2 tablets by mouth 2 times daily Take while on oral narcotics to prevent or treat constipation. 30 tablet 0     No current facility-administered medications for this encounter.       Labs:   Recent Labs   Lab Test 04/06/25  0850 04/05/25  0847 04/04/25  1108   ALBUMIN  --   --  3.3*   HGB 11.0*   < > 12.0   WBC  --   --  10.7    < > = values in this interval not displayed.     Creatinine   Date Value Ref Range Status   04/05/2025 0.66 0.51 - 0.95 mg/dL Final     GFR Estimate   Date Value Ref Range Status   04/05/2025 88 >60 mL/min/1.73m2 Final     Comment:     eGFR calculated using 2021 CKD-EPI equation.     No results found for: \"GFRESTBLACK\"  WBC Count   Date Value Ref Range Status   04/04/2025 10.7 4.0 - 11.0 10e3/uL Final     Lab Results   Component Value Date    CR 0.66 04/05/2025        Nutrition requirements were discussed with patient " "today.  Objective:  /82 (BP Location: Right arm, Patient Position: Sitting)   Pulse 85   Temp 97.5  F (36.4  C) (Temporal)   Ht 1.651 m (5' 5\")   Wt 101.6 kg (224 lb)   BMI 37.28 kg/m    Wound Leg Other (comment) (Active)       Wound (used by OP [facility] only) 05/19/25 1050 knee right;anterior surgical (Active)   Thickness/Stage full thickness 05/19/25 1000   Base pink;red;slough 05/19/25 1000   Periwound intact;edematous 05/19/25 1000   Periwound Temperature warm 05/19/25 1000   Periwound Skin Turgor soft 05/19/25 1000   Edges open 05/19/25 1000   Length (cm) 0.8 05/19/25 1000   Width (cm) 0.1 05/19/25 1000   Depth (cm) 0.6 05/19/25 1000   Wound (cm^2) 0.08 cm^2 05/19/25 1000   Wound Volume (cm^3) 0.05 cm^3 05/19/25 1000   Tunneling [Depth (cm)/Location] 0.9cm / 12 o'clock 05/19/25 1000   Drainage Characteristics/Odor serosanguineous;creamy 05/19/25 1000   Drainage Amount moderate 05/19/25 1000       Incision/Surgical Site 04/03/25 Anterior;Right Knee (Active)      4/5/2025 laboratory: K= 3.5 SCr = 0.66 Hgb= 10.8 glucose= 161  General:  Patient is alert and orientated, no acute distress.  Very comfortable.  Here with her .  Chest= clear  Cardiovascular= regular rate  Right midline knee incision.  Some generalized redness in the skin but no blanching like erythema.  Small amount of serous drainage from the lower portion of the incision that does probe deeper.    Vascular: Nonpalpable but multiphasic on bedside Doppler to AT and PT    Very concerned about undermining.  [place] that we should unroofed the incision distally and discussed with patient and .    Procedure:   Patient was determined to be capable of making their own medical decisions and informed consent was obtained, topical anesthetic of 4% lidocaine was applied,.  Double prepped with Betadine around the lower portion of the incision.  Field block with 10 mL of 0.5% Marcaine with excellent results.  Using a #11 blade scalpel I opened up " the lower incision for a length of 2 cm.  A curette was then used which extended for a depth of almost 3 cm with some serous drainage.  Full-thickness/subcutaneous debridement less than 2 cm .  Sterilely packed with a strip of Aquacel Ag.   Bleeding controlled with light pressure. Patient tolerated procedure well.    Impression: Breakdown at distal knee incision.  This is a deeper wound going close to the prosthesis which does raise concerns about potential contamination of the prosthetic joint.  Discussed with patient and wife.    Trying to reach Dr. Fowler to update him.  Spoke with his .  Patient be discharged to home and orthopedics will reach out to her (?  Need for operative lavage of site).  Requested to hold off on antibiotics till orthopedic evaluation.        Over 35 minutes with patient today     Jose Rivera MD on 5/19/2025 at 10:57 AM    Addendum.  I was able to speak with Dr. Burton this afternoon.  He would like us to continue with conservative treatment with the dressing changes.  He will call her and place her on prophylactic antibiotics due to the underlying knee prosthesis.        Dictated using Dragon voice recognition software which may result in transcription errors        Further instructions from your care team         05/19/2025   Liliane Romo   1945    A DME (durable medical equipment) order for supplies has been placed to [facility]. If there are any issues with your order including not receiving the order please call our clinic at [phone]. Do not call [facility]. We are better able to help you. We can contact the [facility] rep to better assist than if you call the general [facility] number. We can provide a tracking number also if needed.     In addition to your supplies, [facility] will be sending an ancillary kit free of charge. This kit includes gauze, gloves, and saline. You may use the saline to clean your wound in place of the soap and water if you so choose. You  "will receive 1 kit per 15 days of the supply order. We typically order 30 days of supplies so you will receive 2 kits. Please let us know if you would not like to receive this kit and we can communicate this to [facility]. The other supplies will be billed to your insurance. Your insurance may require a copay or deductible for the supplies.     Dressing changes outside of clinic are being performed by Patient and Spouse    Plan 05/19/2025   -Call out to Dr. Zurita at [facility] for Dr. Rivera to discuss antibiotics/cleaning the area out in the OR  -Keep wound covered when showering. Keep leg dry with use of a cast protector (available at [facility] or [facility])  -Do not soak or submerge your foot/leg in pools, hot tubs, lakes, oceans, tubs, etc.    Wound Dressing Change: Knee anterior knee   - Wash your hands with soap and water before you begin your dressing change and prepare a clean surface for dressings.  - Cleanse with mild unscented soap and water (such as Cetaphil, Cerave or Dove)   - Primary dressing: Cut a wick or a strip 3 lines of the (1/5th of 5x9) Aquacel AG and tuck the strip in towards 12 o'clock with a pair of tweezers (clean the tweezers with rubbing alcohol before and after each use)   -Apply a small part of the 5x9 ABD pad over the wick to avoid irritating from the netting  -Pull up edemawear navy and red stripe  -Secondary dressing: Apply 1 5x9 ABD pad and secure with 1/2 4\" Kerlix roll gauze and medipore tape. Can also tuck under the edemawear   Change dressing Daily and as needed for soilage/leakage  You do not need to change the dressing on the days you are being seen at the wound clinic      Elevation:  It is recommended that you elevate your legs above the level of your heart for 30 minutes: approximately 2-3 times each day. Be sure to support your knee when you elevate your leg to not cause knee pain.  Ways to do this:   - Lay on the couch or your bed and prop your legs up on pillows   - Recline back as far " as you can go in your recliner and prop your legs on pillows.   Doing these things will help reduce the edema in your legs.      A diet high in protein is important for wound healing, we recommend getting 90 grams of protein per day (unless directed otherwise to limit your protein intake). Taking protein shakes or bars are a good way to get extra protein in your diet.     Good sources of protein:  Pork 26g per 3 oz  Whey protein powder - 24g per scoop (on average)  Greek yogurt - 23g per 8oz   Chicken or Turkey - 23g per 3oz  Fish - 20-25g per 3oz  Beef - 18-23g per 3oz  Tofu - 10g per 1/2 cup  Navy beans - 20g per cup  Cottage cheese - 14g per 1/2 cup   Lentils - 13g per 1/4 cup  Beef jerky 13g per 1oz  2% milk - 8g per cup  Peanut butter - 8g per 2 tablespoons  Eggs - 6g per egg  Mixed nuts - 6g per 2oz       Main Provider: Jose Rivera M.D. May 19, 2025    Call us at [phone] if you have any questions about your wounds, if you have redness or swelling around your wound, have a fever of 101 degrees Fahrenheit or greater or if you have any other problems or concerns. We answer the phone Monday through Friday 8 am to 4 pm, please leave a message as we check the voicemail frequently throughout the day. If you have a concern over the weekend, please leave a message and we will return your call Monday. If the need is urgent, go to the ER or urgent care.    If you had a positive experience please indicate that on your patient satisfaction survey form that [facility] will be sending you.    It was a pleasure meeting with you today.  Thank you for allowing me and my team the privilege of caring for you today.  YOU are the reason we are here, and I truly hope we provided you with the excellent service you deserve.  Please let us know if there is anything else we can do for you so that we can be sure you are leaving completely satisfied with your care experience.      If you have any billing related  questions please call the [facility] Business office at [phone]. The clinic staff does not handle billing related matters.    If you are scheduled to have a follow up appointment, you will receive a reminder call the day before your visit. On the appointment day please arrive 15 minutes prior to your appointment time. If you are unable to keep that appointment, please call the clinic to cancel or reschedule. If you are more than 10 minutes late or greater for your scheduled appointment time, the clinic policy is that you may be asked to reschedule.

## 2025-05-19 NOTE — DISCHARGE INSTRUCTIONS
05/19/2025   Liliane Romo   1945    A DME (durable medical equipment) order for supplies has been placed to [facility]. If there are any issues with your order including not receiving the order please call our clinic at [phone]. Do not call [facility]. We are better able to help you. We can contact the [facility] rep to better assist than if you call the general [facility] number. We can provide a tracking number also if needed.     In addition to your supplies, [facility] will be sending an ancillary kit free of charge. This kit includes gauze, gloves, and saline. You may use the saline to clean your wound in place of the soap and water if you so choose. You will receive 1 kit per 15 days of the supply order. We typically order 30 days of supplies so you will receive 2 kits. Please let us know if you would not like to receive this kit and we can communicate this to [facility]. The other supplies will be billed to your insurance. Your insurance may require a copay or deductible for the supplies.     Dressing changes outside of clinic are being performed by Patient and Spouse    Plan 05/19/2025   -Call out to Dr. Zurita at [facility] for Dr. Rivera to discuss antibiotics/cleaning the area out in the OR  -Keep wound covered when showering. Keep leg dry with use of a cast protector (available at [facility] or [facility])  -Do not soak or submerge your foot/leg in pools, hot tubs, lakes, oceans, tubs, etc.    Wound Dressing Change: Knee anterior knee   - Wash your hands with soap and water before you begin your dressing change and prepare a clean surface for dressings.  - Cleanse with mild unscented soap and water (such as Cetaphil, Cerave or Dove)   - Primary dressing: Cut a wick or a strip 3 lines of the (1/5th of 5x9) Aquacel AG and tuck the strip in towards 12 o'clock with a pair of tweezers (clean the tweezers with rubbing alcohol before and after each use)   -Apply a small part of the 5x9 ABD pad over the wick to avoid irritating from the  "netting  -Pull up edemawear navy and red stripe  -Secondary dressing: Apply 1 5x9 ABD pad and secure with 1/2 4\" Kerlix roll gauze and medipore tape. Can also tuck under the edemawear   Change dressing Daily and as needed for soilage/leakage  You do not need to change the dressing on the days you are being seen at the wound clinic      Elevation:  It is recommended that you elevate your legs above the level of your heart for 30 minutes: approximately 2-3 times each day. Be sure to support your knee when you elevate your leg to not cause knee pain.  Ways to do this:   - Lay on the couch or your bed and prop your legs up on pillows   - Recline back as far as you can go in your recliner and prop your legs on pillows.   Doing these things will help reduce the edema in your legs.      A diet high in protein is important for wound healing, we recommend getting 90 grams of protein per day (unless directed otherwise to limit your protein intake). Taking protein shakes or bars are a good way to get extra protein in your diet.     Good sources of protein:  Pork 26g per 3 oz  Whey protein powder - 24g per scoop (on average)  Greek yogurt - 23g per 8oz   Chicken or Turkey - 23g per 3oz  Fish - 20-25g per 3oz  Beef - 18-23g per 3oz  Tofu - 10g per 1/2 cup  Navy beans - 20g per cup  Cottage cheese - 14g per 1/2 cup   Lentils - 13g per 1/4 cup  Beef jerky 13g per 1oz  2% milk - 8g per cup  Peanut butter - 8g per 2 tablespoons  Eggs - 6g per egg  Mixed nuts - 6g per 2oz       Main Provider: Jose Rivera M.D. May 19, 2025    Call us at [phone] if you have any questions about your wounds, if you have redness or swelling around your wound, have a fever of 101 degrees Fahrenheit or greater or if you have any other problems or concerns. We answer the phone Monday through Friday 8 am to 4 pm, please leave a message as we check the voicemail frequently throughout the day. If you have a concern over the weekend, please leave a " message and we will return your call Monday. If the need is urgent, go to the ER or urgent care.    If you had a positive experience please indicate that on your patient satisfaction survey form that [facility] will be sending you.    It was a pleasure meeting with you today.  Thank you for allowing me and my team the privilege of caring for you today.  YOU are the reason we are here, and I truly hope we provided you with the excellent service you deserve.  Please let us know if there is anything else we can do for you so that we can be sure you are leaving completely satisfied with your care experience.      If you have any billing related questions please call the [facility] Business office at [phone]. The clinic staff does not handle billing related matters.    If you are scheduled to have a follow up appointment, you will receive a reminder call the day before your visit. On the appointment day please arrive 15 minutes prior to your appointment time. If you are unable to keep that appointment, please call the clinic to cancel or reschedule. If you are more than 10 minutes late or greater for your scheduled appointment time, the clinic policy is that you may be asked to reschedule.

## 2025-05-19 NOTE — PROGRESS NOTES
Patient arrived for wound care visit. Certified Wound Care Nurse time spent evaluating patient record, completed a full evaluation and documented wound(s) & kevin-wound skin; provided recommendation based on treatment plan. Applied dressing, reviewed discharge instructions, patient education, and discussed plan of care with appropriate medical team staff members and patient and/or family members.

## 2025-06-02 ENCOUNTER — TELEPHONE (OUTPATIENT)
Dept: WOUND CARE | Facility: CLINIC | Age: 80
End: 2025-06-02
Payer: COMMERCIAL

## 2025-06-02 ENCOUNTER — HOSPITAL ENCOUNTER (OUTPATIENT)
Dept: WOUND CARE | Facility: CLINIC | Age: 80
Discharge: HOME | End: 2025-06-02
Attending: SURGERY | Admitting: SURGERY
Payer: COMMERCIAL

## 2025-06-02 ENCOUNTER — TELEPHONE (OUTPATIENT)
Dept: WOUND CARE | Facility: CLINIC | Age: 80
End: 2025-06-02

## 2025-06-02 VITALS — TEMPERATURE: 98.9 F | DIASTOLIC BLOOD PRESSURE: 59 MMHG | HEART RATE: 63 BPM | SYSTOLIC BLOOD PRESSURE: 128 MMHG

## 2025-06-02 DIAGNOSIS — L97.812 SKIN ULCER OF RIGHT KNEE WITH FAT LAYER EXPOSED (H): Primary | ICD-10-CM

## 2025-06-02 PROCEDURE — 11042 DBRDMT SUBQ TIS 1ST 20SQCM/<: CPT | Performed by: SURGERY

## 2025-06-02 NOTE — PROGRESS NOTES
[facility] [place] Progress Note    Subject: Liliane Romo and her  returned.  She underwent a right total knee replacement by Dr. Burton with the distal portion breaking down with Serous drainage.  I saw her on 5/19/2025 here at the wound clinic and opened up the distal portion to allow for appropriate dressing changes.  Dr. Burton did place her on oral antibiotics which will be finishing shortly.    Wound was larger with more bioburden and we discussed this.  He is still not concerned about prosthetic infection but obviously good wound care is necessary.  We felt that wound VAC would be most appropriate.      She was seen in the wound clinic last week where debridement was performed on 5/30/2025.  Using wound Vashe and dressing changes.  Wound VAC was ordered and comes back today for reevaluation.        Patient Active Problem List   Diagnosis    Status post total knee replacement, left    Status post total left knee replacement    Status post total knee replacement, right    Status post right knee replacement    Skin ulcer of right knee with fat layer exposed (H)     Past Medical History:   Diagnosis Date    Arthritis     BPPV (benign paroxysmal positional vertigo)     Complex tear of medial meniscus of right knee     De Quervain's tenosynovitis, left     Diverticulosis     DJD (degenerative joint disease) of knee, bilateral     Esophageal stricture     Dilation x 2    Goiter     History of hormone replacement therapy     HLD (hyperlipidemia)     Hypertension     Hypoparathyroidism     Malignant neoplasm of thyroid gland (H)     Migraine     Obese     Plantar neuroma of right foot     Postablative hypothyroidism     Thyroid disease     Tibial plateau fracture, left     Tremor of right hand      Exam:  /59 (BP Location: Left arm, Patient Position: Chair, Cuff Size: Adult Regular)   Pulse 63   Temp 98.9  F (37.2  C) (Temporal)   Wound Leg Other (comment) (Active)       Wound (used  by OP [facility] only) 05/19/25 1050 knee right;anterior surgical (Active)   Thickness/Stage full thickness 06/02/25 1051   Base slough 06/02/25 1051   Periwound intact;edematous 06/02/25 1051   Periwound Temperature warm 06/02/25 1051   Periwound Skin Turgor soft 06/02/25 1051   Edges open 06/02/25 1051   Length (cm) 3.5 06/02/25 1051   Width (cm) 1.4 06/02/25 1051   Depth (cm) 2.8 06/02/25 1051   Wound (cm^2) 4.9 cm^2 06/02/25 1051   Wound Volume (cm^3) 13.72 cm^3 06/02/25 1051   Wound healing % -6025 06/02/25 1051   Tunneling [Depth (cm)/Location] 4/12oclock 06/02/25 1051   Undermining [Depth (cm)/Location] 1.4/11-3oclock 06/02/25 1051   Drainage Characteristics/Odor serosanguineous;creamy;malodorous 06/02/25 1051   Drainage Amount copious 06/02/25 1051   Care, Wound non-select wound debridement performed. 06/02/25 1051       Incision/Surgical Site 04/03/25 Anterior;Right Knee (Active)     Alert and appropriate.  Here with her .  Ulceration as noted in the photograph.  This does extend down deeper and is larger than when I saw her a week ago.  No obvious infection though there is some redness more proximally at the 12 o'clock position.  Moderate bioburden is noted but overall much  than it was last Friday.    Procedure:   Patient was determined to be capable of making their own medical decisions and informed consent was obtained. Topical anesthetic of 4% lidocaine was applied, debridement was performed using a #15 blade down to and including subcutaneous tissue.  All bioburden debrided.  Slightly extended the incision 1 cm proximally to open this up further and prevent tunneling.  There was several Monocryl sutures that were loose that were excised from the wound.  Very good vascularity was noted.  Bleeding controlled with light pressure. Patient tolerated procedure well.  Total area approximately 5 cm .      Impression: #1.  Partial surgical dehiscence for total knee.  Debrided to healthy vascularized  tissue today.  Agree that aggressive treatment is indicated.    #2.  Wound VAC has not yet arrived.  Will continue with home dressing changes with her  and return on 6/4/2025.  Will apply the wound VAC at that time.    #3.  Trying to arrange home health care to change the VAC 2-3 times weekly.  Will see her again in 1 week to evaluate wound progress.  She and her  know that this needs to heal by secondary intent.    - They are also checking with home health care.  Initially she will be in her home in [place] but [place] and see if home health care from that [facility] can see her.    Plan: We will dress the wounds with Aquacel Ag until wound VAC available.  Patient will return to the clinic in 1 weeks time      Jose Rivera MD on 6/2/2025 at 11:01 AM      Dictated using Dragon voice recognition software which may result in transcription errors        Further instructions from your care team         06/02/2025   Liliane Romo   1945    Supplies were not ordered as NPWT will be applied on 6-4-25. NPWT is being shipped to the wound clinic.    Dressing changes outside of clinic are being performed by Spouse  (Home care referrals will be sent for wound vac changes)    Plan 06/02/2025   Return here on Wednesday for application of your wound vac  Dr. Zuniga with [facility] consulting with Dr. Rivera to discuss antibiotics/cleaning the area out in the OR  Keep wound covered when showering. Keep leg dry with use of a cast protector (available at [facility] or [facility])  Do not soak or submerge your foot/leg in pools, hot tubs, lakes, oceans, tubs, etc.  You do not need to change the dressing on the days you are being seen at the wound clinic       Until wound vac arrives please follow these orders...  Wound Dressing Change: Right Knee Anterior Knee   - Wash your hands with soap and water before you begin your dressing change and prepare a clean surface for dressings.  - Cleanse  with mild unscented soap and water (such as Cetaphil, Cerave or Dove).   - Apply small amount of VASHE on gauze, lay into wound bed, let sit for 10 minutes, remove gauze (do not rinse). Pat dry.  - Primary dressing: Fill wound cavity, undermining and tunnels at 12 & 7 o'clock with AMD roll gauze  - Pull up edemawear navy and red stripe from base of toes to back of knee  - Secondary dressing: Cover with 7x9 Mextra (or ABD pad)  - Secure with 4 inch Conforming roll gauze and medipore tape.  - Pull on Spandagrip G from mid calf to mid thigh (tan stocking)  Change dressing Daily and as needed for saturation/soilage/dislodgment    Once your wound vac has been delivered, please follow these orders...  Remove old dressing and ensure all black foam is removed  Cleanse wound with Vashe moist gauze, leave in place for 10 minutes; remove   Cleanse periwound skin with wound spray, pat dry and apply No Sting Barrier film.  Apply drape to frame the wound  Cut Black Foam to fill the depth of wound and the undermining from 11-3 oclock but please ensure it is not overstuffed.  Cover wound with VAC dressing tape to seal the foam, cut appropriate size opening for the TRAC pad.  Connect TRAC pad and connect to pump; NPWT -125 mmHg Continuous, ensure no leaks  Change canister when alarms full or weekly  Change dressing two to three times a week    Document on the outside of the dressing the number of sponges used and the date of the dressing  If dressing is compromised for greater than 2 hours then please remove entire dressing and change or tuck moist Vashe gauze into wound until new dressing can be applied.    May use ostomy paste for divots and crevices as needed to maintain seal.       Elevation:  It is recommended that you elevate your legs above the level of your heart for 30 minutes: approximately 2-3 times each day. Be sure to support your knee when you elevate your leg to not cause knee pain.  Ways to do this:   - Lay on the  couch or your bed and prop your legs up on pillows   - Recline back as far as you can go in your recliner and prop your legs on pillows.   Doing these things will help reduce the edema in your legs.     Protein:  A diet high in protein is important for wound healing, we recommend getting 90 grams of protein per day (unless directed otherwise to limit your protein intake). Taking protein shakes or bars are a good way to get extra protein in your diet.      Good sources of protein:  Pork 26g per 3 oz  Whey protein powder - 24g per scoop (on average)  Greek yogurt - 23g per 8oz   Chicken or Turkey - 23g per 3oz  Fish - 20-25g per 3oz  Beef - 18-23g per 3oz  Tofu - 10g per 1/2 cup  Navy beans - 20g per cup  Cottage cheese - 14g per 1/2 cup   Lentils - 13g per 1/4 cup  Beef jerky 13g per 1oz  2% milk - 8g per cup  Peanut butter - 8g per 2 tablespoons  Eggs - 6g per egg  Mixed nuts - 6g per 2oz    You do not need to change the dressing on the days you are being seen at the wound clinic     Main Provider: Jose Rivera M.D. June 2, 2025    Call us at [phone] if you have any questions about your wounds, if you have redness or swelling around your wound, have a fever of 101 degrees Fahrenheit or greater or if you have any other problems or concerns. We answer the phone Monday through Friday 8 am to 4 pm, please leave a message as we check the voicemail frequently throughout the day. If you have a concern over the weekend, please leave a message and we will return your call Monday. If the need is urgent, go to the ER or urgent care.    If you had a positive experience please indicate that on your patient satisfaction survey form that [facility] will be sending you.    It was a pleasure meeting with you today.  Thank you for allowing me and my team the privilege of caring for you today.  YOU are the reason we are here, and I truly hope we provided you with the excellent service you deserve.  Please let us know  if there is anything else we can do for you so that we can be sure you are leaving completely satisfied with your care experience.      If you have any billing related questions please call the [facility] Business office at [phone]. The clinic staff does not handle billing related matters.    If you are scheduled to have a follow up appointment, you will receive a reminder call the day before your visit. On the appointment day please arrive 15 minutes prior to your appointment time. If you are unable to keep that appointment, please call the clinic to cancel or reschedule. If you are more than 10 minutes late or greater for your scheduled appointment time, the clinic policy is that you may be asked to reschedule.

## 2025-06-02 NOTE — DISCHARGE INSTRUCTIONS
06/02/2025   Liliane Romo   1945    Supplies were not ordered as NPWT will be applied on 6-4-25. NPWT is being shipped to the wound clinic.    Dressing changes outside of clinic are being performed by Spouse  (Home care referrals will be sent for wound vac changes)    Plan 06/02/2025   Return here on Wednesday for application of your wound vac  Dr. Zuniga with [facility] consulting with Dr. Rivera to discuss antibiotics/cleaning the area out in the OR  Keep wound covered when showering. Keep leg dry with use of a cast protector (available at [employer] or [place])  Do not soak or submerge your foot/leg in pools, hot tubs, lakes, oceans, tubs, etc.  You do not need to change the dressing on the days you are being seen at the wound clinic       Until wound vac arrives please follow these orders...  Wound Dressing Change: Right Knee Anterior Knee   - Wash your hands with soap and water before you begin your dressing change and prepare a clean surface for dressings.  - Cleanse with mild unscented soap and water (such as Cetaphil, Cerave or Dove).   - Apply small amount of VASHE on gauze, lay into wound bed, let sit for 10 minutes, remove gauze (do not rinse). Pat dry.  - Primary dressing: Fill wound cavity and undermining from 11-3 o'clock with AMD roll gauze  - Pull up edemawear navy and red stripe from base of toes to back of knee  - Secondary dressing: Cover with 7x9 Mextra (or ABD pad)  - Secure with 4 inch Conforming roll gauze and medipore tape.  - Pull on Spandagrip G from mid calf to mid thigh (tan stocking)  Change dressing Daily and as needed for saturation/soilage/dislodgment    Once your wound vac has been delivered, please follow these orders...  Remove old dressing and ensure all black foam is removed  Cleanse wound with Vashe moist gauze, leave in place for 10 minutes; remove   Cleanse periwound skin with wound spray, pat dry and apply No Sting Barrier film.  Apply drape to frame the wound  Cut Black Foam to fill  the depth of wound and the undermining from 11-3 oclock but please ensure it is not overstuffed.  Cover wound with VAC dressing tape to seal the foam, cut appropriate size opening for the TRAC pad.  Connect TRAC pad and connect to pump; NPWT -125 mmHg Continuous, ensure no leaks  Change canister when alarms full or weekly  Change dressing two to three times a week    Document on the outside of the dressing the number of sponges used and the date of the dressing  If dressing is compromised for greater than 2 hours then please remove entire dressing and change or tuck moist Vashe gauze into wound until new dressing can be applied.    May use ostomy paste for divots and crevices as needed to maintain seal.       Elevation:  It is recommended that you elevate your legs above the level of your heart for 30 minutes: approximately 2-3 times each day. Be sure to support your knee when you elevate your leg to not cause knee pain.  Ways to do this:   - Lay on the couch or your bed and prop your legs up on pillows   - Recline back as far as you can go in your recliner and prop your legs on pillows.   Doing these things will help reduce the edema in your legs.     Protein:  A diet high in protein is important for wound healing, we recommend getting 90 grams of protein per day (unless directed otherwise to limit your protein intake). Taking protein shakes or bars are a good way to get extra protein in your diet.      Good sources of protein:  Pork 26g per 3 oz  Whey protein powder - 24g per scoop (on average)  Greek yogurt - 23g per 8oz   Chicken or Turkey - 23g per 3oz  Fish - 20-25g per 3oz  Beef - 18-23g per 3oz  Tofu - 10g per 1/2 cup  Navy beans - 20g per cup  Cottage cheese - 14g per 1/2 cup   Lentils - 13g per 1/4 cup  Beef jerky 13g per 1oz  2% milk - 8g per cup  Peanut butter - 8g per 2 tablespoons  Eggs - 6g per egg  Mixed nuts - 6g per 2oz    You do not need to change the dressing on the days you are being seen at the  wound clinic     Main Provider: Jose Rivera M.D. June 2, 2025    Call us at [phone] if you have any questions about your wounds, if you have redness or swelling around your wound, have a fever of 101 degrees Fahrenheit or greater or if you have any other problems or concerns. We answer the phone Monday through Friday 8 am to 4 pm, please leave a message as we check the voicemail frequently throughout the day. If you have a concern over the weekend, please leave a message and we will return your call Monday. If the need is urgent, go to the ER or urgent care.    If you had a positive experience please indicate that on your patient satisfaction survey form that [facility] will be sending you.    It was a pleasure meeting with you today.  Thank you for allowing me and my team the privilege of caring for you today.  YOU are the reason we are here, and I truly hope we provided you with the excellent service you deserve.  Please let us know if there is anything else we can do for you so that we can be sure you are leaving completely satisfied with your care experience.      If you have any billing related questions please call the [facility] Business office at [phone]. The clinic staff does not handle billing related matters.    If you are scheduled to have a follow up appointment, you will receive a reminder call the day before your visit. On the appointment day please arrive 15 minutes prior to your appointment time. If you are unable to keep that appointment, please call the clinic to cancel or reschedule. If you are more than 10 minutes late or greater for your scheduled appointment time, the clinic policy is that you may be asked to reschedule.

## 2025-06-02 NOTE — TELEPHONE ENCOUNTER
Home care referral sent to:    [facility] Phone [phone] Fax [phone]

## 2025-06-02 NOTE — TELEPHONE ENCOUNTER
Returned call to [facility] and gave the delivery location and time request information. [facility] is not able to deliver the wound VAC this AM. They will deliver it tomorrow for a placement on 6/4/25. Patient and provider updated regarding this.

## 2025-06-02 NOTE — TELEPHONE ENCOUNTER
[facility]    Who is the name of the provider?:  Coleen    What is the location you see this provider at/preferred location?: [facility] [place] [facility]  Person calling / Facility: [facility]  Phone number:  [phone]   Nurse call back needed:  YES   Can we leave a detailed message on this number?  YES     Reason for call:  Calling for wound vac work order delivery date and time needed by.     Pharmacy location:  NA

## 2025-06-04 ENCOUNTER — HOSPITAL ENCOUNTER (OUTPATIENT)
Dept: WOUND CARE | Facility: CLINIC | Age: 80
Discharge: HOME | End: 2025-06-04
Attending: SURGERY | Admitting: SURGERY
Payer: COMMERCIAL

## 2025-06-04 VITALS — HEART RATE: 62 BPM | TEMPERATURE: 96.9 F | DIASTOLIC BLOOD PRESSURE: 65 MMHG | SYSTOLIC BLOOD PRESSURE: 120 MMHG

## 2025-06-04 DIAGNOSIS — L97.812 SKIN ULCER OF RIGHT KNEE WITH FAT LAYER EXPOSED (H): Primary | ICD-10-CM

## 2025-06-04 PROCEDURE — 97602 WOUND(S) CARE NON-SELECTIVE: CPT

## 2025-06-04 NOTE — PROGRESS NOTES
[facility] Nurse Note    Subject: Liliane PENA Demetrius presents for nurse only visit for wound check and dressing change. Patients wound vac was in clinic to be applied today for the first time. The tunnel at 12 o'clock was found to be 9cm in length therefore white foam was utilized in the tunnel and black foam was utilized on top to fill the depth. Pressure was also increased to 150mmhg due to the utilization of the white foam. Patient and patients  were educated on usage of the wound vac and how to problem solve if leakage occurs. Discussed on the appropriate parties to contact if needed, such as the clinic, home care, or [facility]. [facility] home care to come out Friday for start of care per patient.       Exam:  /65 (BP Location: Left arm, Patient Position: Chair)   Pulse 62   Temp 96.9  F (36.1  C) (Temporal)   Wound Leg Other (comment) (Active)       Wound (used by OP [facility] only) 05/19/25 1050 knee right;anterior surgical (Active)   Thickness/Stage full thickness 06/04/25 0803   Base slough;pink 06/04/25 0803   Periwound intact;edematous 06/04/25 0803   Periwound Temperature warm 06/04/25 0803   Periwound Skin Turgor soft 06/04/25 0803   Edges open 06/04/25 0803   Length (cm) 3.5 06/02/25 1051   Width (cm) 1.4 06/02/25 1051   Depth (cm) 2.8 06/02/25 1051   Wound (cm^2) 4.9 cm^2 06/02/25 1051   Wound Volume (cm^3) 13.72 cm^3 06/02/25 1051   Wound healing % -6025 06/02/25 1051   Tunneling [Depth (cm)/Location] 9cm / 12 o'clock 06/04/25 0803   Undermining [Depth (cm)/Location] 1.4/11-3oclock 06/02/25 1051   Drainage Characteristics/Odor serosanguineous;creamy;malodorous;other (see comments) 06/04/25 0803   Drainage Amount copious 06/04/25 0803   Care, Wound non-select wound debridement performed. 06/04/25 0803       Incision/Surgical Site 04/03/25 Anterior;Right Knee (Active)       Procedure: Non-selective debridement was performed, no bleeding occurred. Patient tolerated procedure  well.    Wound redressed as directed below.    Plan: Patient will return to the clinic in 1 weeks time      June 4, 2025            Further instructions from your care team         06/04/2025   Liliane Romo   1945    A DME order was not completed because the supplies are ordered by home care or at a care facility    [facility] Phone [phone] Fax [phone]    Plan 06/04/2025   - is in the bag. Charge the machine at night and during the day you can unplug it when going to the bathroom or leaving a room or when its low battery. When the cannister is full, the machine will beep and you can place a new cannister.   -There is a help number in the bag that you can call if you kev assistance you can also call home care or us.   -Home Care will come out to your home on Friday to change the wound vac  Dr. Zuniga with [facility] consulting with Dr. Rivera to discuss antibiotics/cleaning the area out in the OR  Keep wound covered when showering. Keep leg dry with use of a cast protector (available at [facility] or [employer])  Do not soak or submerge your foot/leg in pools, hot tubs, lakes, oceans, tubs, etc.  You do not need to change the dressing on the days you are being seen at the wound clinic        Wound Dressing Change: Right Knee Anterior Knee   Remove old dressing and ensure all black foam is removed  Cleanse wound with Vashe moist gauze, leave in place for 10 minutes; remove   Cleanse periwound skin with wound spray, pat dry and apply No Sting Barrier film.  Apply drape to frame the wound  -Apply a strip of white foam into the tunnel at 12 o'clock 9cm deep.  -Cut Black Foam and apply so that its on top or touching the white foam and fill  the depth of wound (fill up over the wound and over the drape as the vac will suck it down)  Cover wound with VAC dressing tape to seal the foam, cut appropriate size opening for the TRAC pad.  Connect TRAC pad and connect to pump; NPWT -150 mmHg Continuous, ensure no  leaks  Change canister when alarms full or weekly  -Pull up your edemawear from foot to behind the knee. And Spandage size 8 from calf to mid thigh. Cut a small hole to fit the tubing through.   Change dressing two to three times a week with home care. At your next apt on Monday the clinic will change your wound vac.   Document on the outside of the dressing the number of sponges used and the date of the dressing  If dressing is compromised for greater than 2 hours then please remove entire dressing and change or tuck moist Vashe gauze into wound until new dressing can be applied.  May use ostomy paste for divots and crevices as needed to maintain seal.        Elevation:  It is recommended that you elevate your legs above the level of your heart for 30 minutes: approximately 2-3 times each day. Be sure to support your knee when you elevate your leg to not cause knee pain.  Ways to do this:   - Lay on the couch or your bed and prop your legs up on pillows   - Recline back as far as you can go in your recliner and prop your legs on pillows.   Doing these things will help reduce the edema in your legs.     Protein:  A diet high in protein is important for wound healing, we recommend getting 90 grams of protein per day (unless directed otherwise to limit your protein intake). Taking protein shakes or bars are a good way to get extra protein in your diet.      Good sources of protein:  Pork 26g per 3 oz  Whey protein powder - 24g per scoop (on average)  Greek yogurt - 23g per 8oz   Chicken or Turkey - 23g per 3oz  Fish - 20-25g per 3oz  Beef - 18-23g per 3oz  Tofu - 10g per 1/2 cup  Navy beans - 20g per cup  Cottage cheese - 14g per 1/2 cup   Lentils - 13g per 1/4 cup  Beef jerky 13g per 1oz  2% milk - 8g per cup  Peanut butter - 8g per 2 tablespoons  Eggs - 6g per egg  Mixed nuts - 6g per 2oz     You do not need to change the dressing on the days you are being seen at the wound clinic     Main Provider: Jose TOVAR  FRED Rivera June 4, 2025    Call us at [phone] if you have any questions about your wounds, if you have redness or swelling around your wound, have a fever of 101 degrees Fahrenheit or greater or if you have any other problems or concerns. We answer the phone Monday through Friday 8 am to 4 pm, please leave a message as we check the voicemail frequently throughout the day. If you have a concern over the weekend, please leave a message and we will return your call Monday. If the need is urgent, go to the ER or urgent care.    If you had a positive experience please indicate that on your patient satisfaction survey form that [facility] will be sending you.    It was a pleasure meeting with you today.  Thank you for allowing our team the privilege of caring for you today.  YOU are the reason we are here, and we truly hope we provided you with the excellent service you deserve.  Please let us know if there is anything else we can do for you so that we can be sure you are leaving completely satisfied with your care experience.      If you have any billing related questions please call the [facility] Business office at [phone]. The clinic staff does not handle billing related matters.    If you are scheduled to have a follow up appointment, you will receive a reminder call the day before your visit. On the appointment day please arrive 15 minutes prior to your appointment time. If you are unable to keep that appointment, please call the clinic to cancel or reschedule. If you are more than 10 minutes late or greater for your scheduled appointment time, the clinic policy is that you may be asked to reschedule.

## 2025-06-04 NOTE — DISCHARGE INSTRUCTIONS
06/04/2025   Liliane Romo   1945    A DME order was not completed because the supplies are ordered by home care or at a care facility    [facility] Phone [phone] Fax [phone]    Plan 06/04/2025   - is in the bag. Charge the machine at night and during the day you can unplug it when going to the bathroom or leaving a room or when its low battery. When the cannister is full, the machine will beep and you can place a new cannister.   -There is a help number in the bag that you can call if you kev assistance you can also call home care or us.   -Home Care will come out to your home on Friday to change the wound vac  Dr. Zuniga with [facility] consulting with Dr. Rivera to discuss antibiotics/cleaning the area out in the OR  Keep wound covered when showering. Keep leg dry with use of a cast protector (available at [facility] or [facility])  Do not soak or submerge your foot/leg in pools, hot tubs, lakes, oceans, tubs, etc.  You do not need to change the dressing on the days you are being seen at the wound clinic        Wound Dressing Change: Right Knee Anterior Knee   Remove old dressing and ensure all black foam is removed  Cleanse wound with Vashe moist gauze, leave in place for 10 minutes; remove   Cleanse periwound skin with wound spray, pat dry and apply No Sting Barrier film.  Apply drape to frame the wound  -Apply a strip of white foam into the tunnel at 12 o'clock 9cm deep.  -Cut Black Foam and apply so that its on top or touching the white foam and fill  the depth of wound (fill up over the wound and over the drape as the vac will suck it down)  Cover wound with VAC dressing tape to seal the foam, cut appropriate size opening for the TRAC pad.  Connect TRAC pad and connect to pump; NPWT -150 mmHg Continuous, ensure no leaks  Change canister when alarms full or weekly  -Pull up your edemawear from foot to behind the knee. And Spandage size 8 from calf to mid thigh. Cut a small hole to fit the  tubing through.   Change dressing two to three times a week with home care. At your next apt on Monday the clinic will change your wound vac.   Document on the outside of the dressing the number of sponges used and the date of the dressing  If dressing is compromised for greater than 2 hours then please remove entire dressing and change or tuck moist Vashe gauze into wound until new dressing can be applied.  May use ostomy paste for divots and crevices as needed to maintain seal.        Elevation:  It is recommended that you elevate your legs above the level of your heart for 30 minutes: approximately 2-3 times each day. Be sure to support your knee when you elevate your leg to not cause knee pain.  Ways to do this:   - Lay on the couch or your bed and prop your legs up on pillows   - Recline back as far as you can go in your recliner and prop your legs on pillows.   Doing these things will help reduce the edema in your legs.     Protein:  A diet high in protein is important for wound healing, we recommend getting 90 grams of protein per day (unless directed otherwise to limit your protein intake). Taking protein shakes or bars are a good way to get extra protein in your diet.      Good sources of protein:  Pork 26g per 3 oz  Whey protein powder - 24g per scoop (on average)  Greek yogurt - 23g per 8oz   Chicken or Turkey - 23g per 3oz  Fish - 20-25g per 3oz  Beef - 18-23g per 3oz  Tofu - 10g per 1/2 cup  Navy beans - 20g per cup  Cottage cheese - 14g per 1/2 cup   Lentils - 13g per 1/4 cup  Beef jerky 13g per 1oz  2% milk - 8g per cup  Peanut butter - 8g per 2 tablespoons  Eggs - 6g per egg  Mixed nuts - 6g per 2oz     You do not need to change the dressing on the days you are being seen at the wound clinic     Main Provider: Jose Rivera M.D. June 4, 2025    Call us at [phone] if you have any questions about your wounds, if you have redness or swelling around your wound, have a fever of 101 degrees  Fahrenheit or greater or if you have any other problems or concerns. We answer the phone Monday through Friday 8 am to 4 pm, please leave a message as we check the voicemail frequently throughout the day. If you have a concern over the weekend, please leave a message and we will return your call Monday. If the need is urgent, go to the ER or urgent care.    If you had a positive experience please indicate that on your patient satisfaction survey form that [facility] will be sending you.    It was a pleasure meeting with you today.  Thank you for allowing our team the privilege of caring for you today.  YOU are the reason we are here, and we truly hope we provided you with the excellent service you deserve.  Please let us know if there is anything else we can do for you so that we can be sure you are leaving completely satisfied with your care experience.      If you have any billing related questions please call the [facility] Business office at [phone]. The clinic staff does not handle billing related matters.    If you are scheduled to have a follow up appointment, you will receive a reminder call the day before your visit. On the appointment day please arrive 15 minutes prior to your appointment time. If you are unable to keep that appointment, please call the clinic to cancel or reschedule. If you are more than 10 minutes late or greater for your scheduled appointment time, the clinic policy is that you may be asked to reschedule.

## 2025-06-09 ENCOUNTER — HOSPITAL ENCOUNTER (OUTPATIENT)
Dept: WOUND CARE | Facility: CLINIC | Age: 80
Discharge: HOME | End: 2025-06-09
Attending: SURGERY | Admitting: SURGERY
Payer: COMMERCIAL

## 2025-06-09 VITALS — DIASTOLIC BLOOD PRESSURE: 68 MMHG | SYSTOLIC BLOOD PRESSURE: 138 MMHG | HEART RATE: 69 BPM | TEMPERATURE: 98.2 F

## 2025-06-09 DIAGNOSIS — L97.812 SKIN ULCER OF RIGHT KNEE WITH FAT LAYER EXPOSED (H): Primary | ICD-10-CM

## 2025-06-09 PROCEDURE — 11042 DBRDMT SUBQ TIS 1ST 20SQCM/<: CPT | Performed by: SURGERY

## 2025-06-09 NOTE — DISCHARGE INSTRUCTIONS
06/09/2025   Liliane Romo   1945      Plan 06/09/2025     A DME order was not completed because the supplies are ordered by home care or at a care facility     [facility] Phone [phone] Fax [phone]     Plan 06/09/2025   -There is a help number in the bag that you can call if you kev assistance you can also call home care or us.   Dr. Zuniga with [facility] consulting with Dr. Rivera to discuss antibiotics/cleaning the area out in the OR. Short term antibiotics were given and completed.   Keep wound covered when showering. Keep leg dry with use of a cast protector (available at [employer] or [employer])  Do not soak or submerge your foot/leg in pools, hot tubs, lakes, oceans, tubs, etc.  You do not need to change the dressing on the days you are being seen at the wound clinic        Wound Dressing Change: Right Knee Anterior Knee   Remove old dressing and ensure all black foam is removed  Cleanse wound with Vashe moist gauze, leave in place for 10 minutes; remove   Cleanse periwound skin with wound spray, pat dry and apply No Sting Barrier film.  -Apply drape to frame the wound  -Cut Black Foam and and tuck it in the tunnel at 12 o'clock at 7cm and then fill  the depth of wound (fill up over the wound and over the drape as the vac will suck it down)  Cover wound with VAC dressing tape to seal the foam, cut appropriate size opening for the TRAC pad.  Connect TRAC pad and connect to pump; NPWT -125 mmHg Continuous, ensure no leaks  Change canister when alarms full or weekly  -Pull up your edemawear from foot to behind the knee. And Spandage size 8 from calf to mid thigh. Cut a small hole to fit the tubing through.   Change dressing two to three times a week with home care. At your next apt on Monday the clinic will change your wound vac.   Document on the outside of the dressing the number of sponges used and the date of the dressing  If dressing is compromised for greater than 2 hours then please remove  entire dressing and change or tuck moist Vashe gauze into wound until new dressing can be applied.  May use ostomy paste for divots and crevices as needed to maintain seal.        Elevation:  It is recommended that you elevate your legs above the level of your heart for 30 minutes: approximately 2-3 times each day. Be sure to support your knee when you elevate your leg to not cause knee pain.  Ways to do this:   - Lay on the couch or your bed and prop your legs up on pillows   - Recline back as far as you can go in your recliner and prop your legs on pillows.   Doing these things will help reduce the edema in your legs.     Protein:  A diet high in protein is important for wound healing, we recommend getting 90 grams of protein per day (unless directed otherwise to limit your protein intake). Taking protein shakes or bars are a good way to get extra protein in your diet.      Good sources of protein:  Pork 26g per 3 oz  Whey protein powder - 24g per scoop (on average)  Greek yogurt - 23g per 8oz   Chicken or Turkey - 23g per 3oz  Fish - 20-25g per 3oz  Beef - 18-23g per 3oz  Tofu - 10g per 1/2 cup  Navy beans - 20g per cup  Cottage cheese - 14g per 1/2 cup   Lentils - 13g per 1/4 cup  Beef jerky 13g per 1oz  2% milk - 8g per cup  Peanut butter - 8g per 2 tablespoons  Eggs - 6g per egg  Mixed nuts - 6g per 2oz     You do not need to change the dressing on the days you are being seen at the wound clinic     Main Provider: Jose Rivera M.D. June 9, 2025    Call us at [phone] if you have any questions about your wounds, if you have redness or swelling around your wound, have a fever of 101 degrees Fahrenheit or greater or if you have any other problems or concerns. We answer the phone Monday through Friday 8 am to 4 pm, please leave a message as we check the voicemail frequently throughout the day. If you have a concern over the weekend, please leave a message and we will return your call Monday. If the need  is urgent, go to the ER or urgent care.    If you had a positive experience please indicate that on your patient satisfaction survey form that [facility] will be sending you.    It was a pleasure meeting with you today.  Thank you for allowing our team the privilege of caring for you today.  YOU are the reason we are here, and we truly hope we provided you with the excellent service you deserve.  Please let us know if there is anything else we can do for you so that we can be sure you are leaving completely satisfied with your care experience.      If you have any billing related questions please call the [facility] Business office at [phone]. The clinic staff does not handle billing related matters.    If you are scheduled to have a follow up appointment, you will receive a reminder call the day before your visit. On the appointment day please arrive 15 minutes prior to your appointment time. If you are unable to keep that appointment, please call the clinic to cancel or reschedule. If you are more than 10 minutes late or greater for your scheduled appointment time, the clinic policy is that you may be asked to reschedule.

## 2025-06-09 NOTE — PROGRESS NOTES
[facility] [place] Progress Note    Subject: Liliane Romo and her friend returns for follow-up.  She had undergone a total knee replacement by Dr. Burton.  Had breakdown of the distal incision debrided here in the wound clinic and initiated on a wound VAC.    Dr. Burton does not feel that she has any deep infection related to the prostatic joint replacement.  She was placed on a short course of antibiotics initially and is no longer on this.  Is on Eliquis due to a tibial DVT for the next several weeks.    Wound VAC has not been draining changing the canister on the average of every day and a half.  No signs of infection.    Patient Active Problem List   Diagnosis    Status post total knee replacement, left    Status post total left knee replacement    Status post total knee replacement, right    Status post right knee replacement    Skin ulcer of right knee with fat layer exposed (H)     Past Medical History:   Diagnosis Date    Arthritis     BPPV (benign paroxysmal positional vertigo)     Complex tear of medial meniscus of right knee     De Quervain's tenosynovitis, left     Diverticulosis     DJD (degenerative joint disease) of knee, bilateral     Esophageal stricture     Dilation x 2    Goiter     History of hormone replacement therapy     HLD (hyperlipidemia)     Hypertension     Hypoparathyroidism     Malignant neoplasm of thyroid gland (H)     Migraine     Obese     Plantar neuroma of right foot     Postablative hypothyroidism     Thyroid disease     Tibial plateau fracture, left     Tremor of right hand      Exam:  /68 (BP Location: Right arm, Patient Position: Semi-Matos's)   Pulse 69   Temp 98.2  F (36.8  C) (Temporal)   Wound Leg Other (comment) (Active)       Wound (used by OP [facility] only) 05/19/25 1050 knee right;anterior surgical (Active)   Thickness/Stage full thickness 06/09/25 0959   Base slough;pink 06/09/25 0959   Periwound intact;edematous 06/09/25 0959   Periwound  Temperature warm 06/09/25 0959   Periwound Skin Turgor soft 06/09/25 0959   Edges open 06/09/25 0959   Length (cm) 5.4 06/09/25 1000   Width (cm) 2.1 06/09/25 1000   Depth (cm) 2.3 06/09/25 1000   Wound (cm^2) 11.34 cm^2 06/09/25 1000   Wound Volume (cm^3) 26.08 cm^3 06/09/25 1000   Wound healing % -[id number] 06/09/25 1000   Tunneling [Depth (cm)/Location] 7cm / 12 o'clock 06/09/25 1000   Undermining [Depth (cm)/Location] 1.4/11-3oclock 06/02/25 1051   Drainage Characteristics/Odor serosanguineous;creamy 06/09/25 0959   Drainage Amount copious 06/09/25 0959   Care, Wound debrided;chemical cautery applied 06/09/25 0959       Incision/Surgical Site 04/03/25 Anterior;Right Knee (Active)     Alert and appropriate.  Very pleasant.  Here with her neighbor friend drove her today since her  is getting colonoscopy.  Swelling is fairly well-controlled with Spandage  No periwound cellulitis.  Large amount of bioburden located within the ulcer bed particularly immediately straight down where there is some nonviable tissue.  No obvious graft exposed.  This does track with a tunnel superiorly at the 12 to 1 o'clock position for approximately 9 cm    Procedure:   Patient was determined to be capable of making their own medical decisions and informed consent was obtained. Topical anesthetic of 4% lidocaine was applied, debridement was performed using a #15 blade down to and including subcutaneous tissue.  Removed the bioburden particularly at the base of the wound with no obvious prosthetic joint noted including bending her knee.  Due to the tunneling I unroofed with a 15 blade scalpel superiorly so this wound can be debrided appropriately.  Total length following debridement is 5 cm.  Bleeding controlled with light pressure.  Standard wound VAC was reapplied into the base of the wound.  Patient tolerated procedure well.  Area now approximately 10 cm .    Impression: Breakdown of distal right prosthetic knee incision.   Concerning due to underlying prosthesis but no obvious involvement.  Due to tunneling we have unroofed the tunnel at the 12 o'clock position to allow adequate debridement today but also adequate application of wound VAC.  This will be changed 3 times weekly with home health care.  They are using Vashe soaks in the wound prior to reapplication of the wound VAC.  Stressed importance of good nutritional supplements.       Patient is well aware of the concerns about infection due to her prosthesis and this will be followed closely with her orthopedic team.    Plan: We will dress the wounds with wound VAC.  Patient will return to the clinic in 1 weeks time      Jose Rivera MD on 6/9/2025 at 10:07 AM      Predebridement and wound opening photograph     dictated using Dragon voice recognition software which may result in transcription errors        Further instructions from your care team         06/09/2025   Liliane Romo   1945      Plan 06/09/2025     A DME order was not completed because the supplies are ordered by home care or at a care facility     [facility] Phone [phone] Fax [phone]     Plan 06/09/2025   -There is a help number in the bag that you can call if you kev assistance you can also call home care or us.   Dr. Zuniga with [facility] consulting with Dr. Rivera to discuss antibiotics/cleaning the area out in the OR. Short term antibiotics were given and completed.   Keep wound covered when showering. Keep leg dry with use of a cast protector (available at [facility] or [facility])  Do not soak or submerge your foot/leg in pools, hot tubs, lakes, oceans, tubs, etc.  You do not need to change the dressing on the days you are being seen at the wound clinic        Wound Dressing Change: Right Knee Anterior Knee   Remove old dressing and ensure all black foam is removed  Cleanse wound with Vashe moist gauze, leave in place for 10 minutes; remove   Cleanse periwound skin with wound spray, pat  dry and apply No Sting Barrier film.  -Apply drape to frame the wound  -Cut Black Foam and and tuck it in the tunnel at 12 o'clock at 7cm and then fill  the depth of wound (fill up over the wound and over the drape as the vac will suck it down)  Cover wound with VAC dressing tape to seal the foam, cut appropriate size opening for the TRAC pad.  Connect TRAC pad and connect to pump; NPWT -125 mmHg Continuous, ensure no leaks  Change canister when alarms full or weekly  -Pull up your edemawear from foot to behind the knee. And Spandage size 8 from calf to mid thigh. Cut a small hole to fit the tubing through.   Change dressing two to three times a week with home care. At your next apt on Monday the clinic will change your wound vac.   Document on the outside of the dressing the number of sponges used and the date of the dressing  If dressing is compromised for greater than 2 hours then please remove entire dressing and change or tuck moist Vashe gauze into wound until new dressing can be applied.  May use ostomy paste for divots and crevices as needed to maintain seal.        Elevation:  It is recommended that you elevate your legs above the level of your heart for 30 minutes: approximately 2-3 times each day. Be sure to support your knee when you elevate your leg to not cause knee pain.  Ways to do this:   - Lay on the couch or your bed and prop your legs up on pillows   - Recline back as far as you can go in your recliner and prop your legs on pillows.   Doing these things will help reduce the edema in your legs.     Protein:  A diet high in protein is important for wound healing, we recommend getting 90 grams of protein per day (unless directed otherwise to limit your protein intake). Taking protein shakes or bars are a good way to get extra protein in your diet.      Good sources of protein:  Pork 26g per 3 oz  Whey protein powder - 24g per scoop (on average)  Greek yogurt - 23g per 8oz   Chicken or Turkey - 23g  per 3oz  Fish - 20-25g per 3oz  Beef - 18-23g per 3oz  Tofu - 10g per 1/2 cup  Navy beans - 20g per cup  Cottage cheese - 14g per 1/2 cup   Lentils - 13g per 1/4 cup  Beef jerky 13g per 1oz  2% milk - 8g per cup  Peanut butter - 8g per 2 tablespoons  Eggs - 6g per egg  Mixed nuts - 6g per 2oz     You do not need to change the dressing on the days you are being seen at the wound clinic     Main Provider: Jose Rivera M.D. June 9, 2025    Call us at [phone] if you have any questions about your wounds, if you have redness or swelling around your wound, have a fever of 101 degrees Fahrenheit or greater or if you have any other problems or concerns. We answer the phone Monday through Friday 8 am to 4 pm, please leave a message as we check the voicemail frequently throughout the day. If you have a concern over the weekend, please leave a message and we will return your call Monday. If the need is urgent, go to the ER or urgent care.    If you had a positive experience please indicate that on your patient satisfaction survey form that [facility] will be sending you.    It was a pleasure meeting with you today.  Thank you for allowing our team the privilege of caring for you today.  YOU are the reason we are here, and we truly hope we provided you with the excellent service you deserve.  Please let us know if there is anything else we can do for you so that we can be sure you are leaving completely satisfied with your care experience.      If you have any billing related questions please call the [facility] Business office at [phone]. The clinic staff does not handle billing related matters.    If you are scheduled to have a follow up appointment, you will receive a reminder call the day before your visit. On the appointment day please arrive 15 minutes prior to your appointment time. If you are unable to keep that appointment, please call the clinic to cancel or reschedule. If you are more than 10  minutes late or greater for your scheduled appointment time, the clinic policy is that you may be asked to reschedule.

## 2025-06-16 ENCOUNTER — HOSPITAL ENCOUNTER (OUTPATIENT)
Dept: WOUND CARE | Facility: CLINIC | Age: 80
Discharge: HOME | End: 2025-06-16
Attending: SURGERY | Admitting: SURGERY
Payer: COMMERCIAL

## 2025-06-16 VITALS — SYSTOLIC BLOOD PRESSURE: 124 MMHG | HEART RATE: 71 BPM | TEMPERATURE: 98.4 F | DIASTOLIC BLOOD PRESSURE: 64 MMHG

## 2025-06-16 DIAGNOSIS — L97.812 SKIN ULCER OF RIGHT KNEE WITH FAT LAYER EXPOSED (H): Primary | ICD-10-CM

## 2025-06-16 PROCEDURE — 11042 DBRDMT SUBQ TIS 1ST 20SQCM/<: CPT | Performed by: SURGERY

## 2025-06-16 NOTE — PROGRESS NOTES
[facility]ound [facility] [place] Progress Note    Subject: Liliane Romo  return for follow-up.  She had developed a breakdown on her distal right knee incision for her prosthetic joint replacement by Dr. Burton.  We have opened up the wound and started the wound VAC several weeks ago.  At her visit on 6/9/2025 there was undermining at the 12 o'clock position and this was unroofed to allow better effect of the wound VAC.    Drainage is decreasing and they are going through a canister every several days.  Nutrition has been good.  Orthopedics replaced her on antibiotics.    Biggest concern is whether the prosthetic joint is involved.  Patient has no signs of systemic infection.    Patient Active Problem List   Diagnosis    Status post total knee replacement, left    Status post total left knee replacement    Status post total knee replacement, right    Status post right knee replacement    Skin ulcer of right knee with fat layer exposed (H)     Past Medical History:   Diagnosis Date    Arthritis     BPPV (benign paroxysmal positional vertigo)     Complex tear of medial meniscus of right knee     De Quervain's tenosynovitis, left     Diverticulosis     DJD (degenerative joint disease) of knee, bilateral     Esophageal stricture     Dilation x 2    Goiter     History of hormone replacement therapy     HLD (hyperlipidemia)     Hypertension     Hypoparathyroidism     Malignant neoplasm of thyroid gland (H)     Migraine     Obese     Plantar neuroma of right foot     Postablative hypothyroidism     Thyroid disease     Tibial plateau fracture, left     Tremor of right hand      Exam:  /64 (BP Location: Right arm, Patient Position: Chair, Cuff Size: Adult Regular)   Pulse 71   Temp 98.4  F (36.9  C) (Temporal)   Wound Leg Other (comment) (Active)       Wound (used by OP [facility] only) 05/19/25 1050 knee right;anterior surgical (Active)   Thickness/Stage full thickness 06/16/25 1032   Base slough;pink;exposed  structure 06/16/25 1032   Periwound intact;edematous 06/16/25 1032   Periwound Temperature warm 06/16/25 1032   Periwound Skin Turgor soft 06/16/25 1032   Edges open 06/16/25 1032   Length (cm) 4.6 06/16/25 1032   Width (cm) 1.9 06/16/25 1032   Depth (cm) 2.4 06/16/25 1032   Wound (cm^2) 8.74 cm^2 06/16/25 1032   Wound Volume (cm^3) 20.98 cm^3 06/16/25 1032   Wound healing % -[id number] 06/16/25 1032   Tunneling [Depth (cm)/Location] 12 o'clock / 1 cm 06/16/25 1032   Undermining [Depth (cm)/Location] 1.4/11-3oclock 06/02/25 1051   Drainage Characteristics/Odor serosanguineous 06/16/25 1032   Drainage Amount copious 06/16/25 1032   Care, Wound debrided 06/16/25 1032       Incision/Surgical Site 04/03/25 Anterior;Right Knee (Active)     Alert appropriate.  Here with her .  Dressings removed.  The majority of the wound has healthy granulation tissue.  Minimal undermining at the 12 o'clock position.  At the 5 o'clock position there is tissue that is not healthy and soft and whitish in color is noted in the photograph.  No obvious cellulitis.  Some skin changes more on the lateral aspect most likely from the adhesive for the wound VAC.    Procedure:   Patient was determined to be capable of making their own medical decisions and informed consent was obtained. Topical anesthetic of 4% lidocaine was applied, debridement was performed using a #15 blade down to and including subcutaneous tissue.  All bioburden over the healthy tissue removed with excellent firm well-vascularized granulation tissue.  Did excise the whitish tissue at 5:00.  The base is very firm to probing.  I do not visualize the prosthetic joint looking at this and also with bending of her knee.  Bleeding controlled with light pressure. Patient tolerated procedure well.    Black sponge was inserted and the wound VAC was read by our staff.    Impression: #1.  I do have concerns about the depth of the wound at 5 o'clock position and the firmness that there  may be underlying prosthesis.  I do feel this needs to be evaluated by Dr. Burton.  Her  has taken a photograph but they may decide to remove the wound VAC at the [facility] clinic to evaluate this area.  If this is necessary we have given her a dressing of Aquacel Ag that she can pack in the wound at [facility] visit until her home health care can change the wound VAC.      #2.  Continues with nutritional supplements.  Antibiotics per [facility].    Plan: We will dress the wounds with wound VAC change 3 times weekly.  Patient will return to the clinic in 2 weeks time      Jose Rivera MD on 6/16/2025 at 10:49 AM      Dictated using Dragon voice recognition software which may result in transcription errors      Further instructions from your care team         06/16/2025   Liliane Romo   1945    A DME order was not completed because the supplies are ordered by home care or at a care facility    Dressing changes outside of clinic are being performed by Home Care    [facility] Phone [phone] Fax [phone] (3 times/week)    Plan 06/16/2025   There is a help number in the bag that you can call if you kev assistance you can also call home care or us.   Keep wound covered when showering. Keep leg dry with use of a cast protector (available at [facility] or [facility])  Do not soak or submerge your foot/leg in pools, hot tubs, lakes, oceans, tubs, etc.  You do not need to change the dressing on the days you are being seen at the wound clinic  Call Orthopedist for appointment - next available     Wound Dressing Change: Right Knee Anterior Knee   Remove old dressing and ensure all black foam is removed  Cleanse wound with Vashe moist gauze, leave in place for 10 minutes; remove   Cleanse periwound skin with wound spray, pat dry and apply No Sting Barrier film.  Apply drape to frame the wound  Cut Black Foam and and tuck it in the tunnel at 12 o'clock at 1cm and then fill  the depth of wound (fill up over the wound  and over the drape as the vac will suck it down)  Cover wound with VAC dressing tape to seal the foam, cut appropriate size opening for the TRAC pad.  Connect TRAC pad and connect to pump; NPWT -125 mmHg Continuous, ensure no leaks  Change canister when alarms full or weekly  Pull up your edemawear from foot to behind the knee. And Spandage size 8 from calf to mid thigh. Cut a small hole to fit the tubing through.   Change dressing two to three times a week with home care. At your next apt on Monday the clinic will change your wound vac.   Document on the outside of the dressing the number of sponges used and the date of the dressing  If dressing is compromised for greater than 2 hours then please remove entire dressing and change or tuck moist Vashe gauze into wound until new dressing can be applied.  May use ostomy paste for divots and crevices as needed to maintain seal.        Elevation:  It is recommended that you elevate your legs above the level of your heart for 30 minutes: approximately 2-3 times each day. Be sure to support your knee when you elevate your leg to not cause knee pain.  Ways to do this:   - Lay on the couch or your bed and prop your legs up on pillows   - Recline back as far as you can go in your recliner and prop your legs on pillows.   Doing these things will help reduce the edema in your legs.     Protein:  A diet high in protein is important for wound healing, we recommend getting 90 grams of protein per day (unless directed otherwise to limit your protein intake). Taking protein shakes or bars are a good way to get extra protein in your diet.      Good sources of protein:  Pork 26g per 3 oz  Whey protein powder - 24g per scoop (on average)  Greek yogurt - 23g per 8oz   Chicken or Turkey - 23g per 3oz  Fish - 20-25g per 3oz  Beef - 18-23g per 3oz  Tofu - 10g per 1/2 cup  Navy beans - 20g per cup  Cottage cheese - 14g per 1/2 cup   Lentils - 13g per 1/4 cup  Beef jerky 13g per 1oz  2% milk  - 8g per cup  Peanut butter - 8g per 2 tablespoons  Eggs - 6g per egg  Mixed nuts - 6g per 2oz        Main Provider: Jose Rivera M.D. June 16, 2025    Call us at [phone] if you have any questions about your wounds, if you have redness or swelling around your wound, have a fever of 101 degrees Fahrenheit or greater or if you have any other problems or concerns. We answer the phone Monday through Friday 8 am to 4 pm, please leave a message as we check the voicemail frequently throughout the day. If you have a concern over the weekend, please leave a message and we will return your call Monday. If the need is urgent, go to the ER or urgent care.    If you had a positive experience please indicate that on your patient satisfaction survey form that [facility] will be sending you.    It was a pleasure meeting with you today.  Thank you for allowing our team the privilege of caring for you today.  YOU are the reason we are here, and we truly hope we provided you with the excellent service you deserve.  Please let us know if there is anything else we can do for you so that we can be sure you are leaving completely satisfied with your care experience.      If you have any billing related questions please call the [facility] Business office at [phone]. The clinic staff does not handle billing related matters.    If you are scheduled to have a follow up appointment, you will receive a reminder call the day before your visit. On the appointment day please arrive 15 minutes prior to your appointment time. If you are unable to keep that appointment, please call the clinic to cancel or reschedule. If you are more than 10 minutes late or greater for your scheduled appointment time, the clinic policy is that you may be asked to reschedule.

## 2025-06-16 NOTE — DISCHARGE INSTRUCTIONS
06/16/2025   Liliane Rmoo   1945    A DME order was not completed because the supplies are ordered by home care or at a care facility    Dressing changes outside of clinic are being performed by Home Care    [facility] Phone [phone] Fax [phone] (3 times/week)    Plan 06/16/2025   There is a help number in the bag that you can call if you kev assistance you can also call home care or us.   Keep wound covered when showering. Keep leg dry with use of a cast protector (available at [employer] or [employer])  Do not soak or submerge your foot/leg in pools, hot tubs, lakes, oceans, tubs, etc.  You do not need to change the dressing on the days you are being seen at the wound clinic  Call Orthopedist for appointment - next available     Wound Dressing Change: Right Knee Anterior Knee   Remove old dressing and ensure all black foam is removed  Cleanse wound with Vashe moist gauze, leave in place for 10 minutes; remove   Cleanse periwound skin with wound spray, pat dry and apply No Sting Barrier film.  Apply drape to frame the wound  Cut Black Foam and and tuck it in the tunnel at 12 o'clock at 1cm and then fill  the depth of wound (fill up over the wound and over the drape as the vac will suck it down)  Cover wound with VAC dressing tape to seal the foam, cut appropriate size opening for the TRAC pad.  Connect TRAC pad and connect to pump; NPWT -125 mmHg Continuous, ensure no leaks  Change canister when alarms full or weekly  Pull up your edemawear from foot to behind the knee. And Spandage size 8 from calf to mid thigh. Cut a small hole to fit the tubing through.   Change dressing two to three times a week with home care. At your next apt on Monday the clinic will change your wound vac.   Document on the outside of the dressing the number of sponges used and the date of the dressing  If dressing is compromised for greater than 2 hours then please remove entire dressing and change or tuck moist Vashe gauze  into wound until new dressing can be applied.  May use ostomy paste for divots and crevices as needed to maintain seal.        Elevation:  It is recommended that you elevate your legs above the level of your heart for 30 minutes: approximately 2-3 times each day. Be sure to support your knee when you elevate your leg to not cause knee pain.  Ways to do this:   - Lay on the couch or your bed and prop your legs up on pillows   - Recline back as far as you can go in your recliner and prop your legs on pillows.   Doing these things will help reduce the edema in your legs.     Protein:  A diet high in protein is important for wound healing, we recommend getting 90 grams of protein per day (unless directed otherwise to limit your protein intake). Taking protein shakes or bars are a good way to get extra protein in your diet.      Good sources of protein:  Pork 26g per 3 oz  Whey protein powder - 24g per scoop (on average)  Greek yogurt - 23g per 8oz   Chicken or Turkey - 23g per 3oz  Fish - 20-25g per 3oz  Beef - 18-23g per 3oz  Tofu - 10g per 1/2 cup  Navy beans - 20g per cup  Cottage cheese - 14g per 1/2 cup   Lentils - 13g per 1/4 cup  Beef jerky 13g per 1oz  2% milk - 8g per cup  Peanut butter - 8g per 2 tablespoons  Eggs - 6g per egg  Mixed nuts - 6g per 2oz        Main Provider: Jose Rivera M.D. June 16, 2025    Call us at [phone] if you have any questions about your wounds, if you have redness or swelling around your wound, have a fever of 101 degrees Fahrenheit or greater or if you have any other problems or concerns. We answer the phone Monday through Friday 8 am to 4 pm, please leave a message as we check the voicemail frequently throughout the day. If you have a concern over the weekend, please leave a message and we will return your call Monday. If the need is urgent, go to the ER or urgent care.    If you had a positive experience please indicate that on your patient satisfaction survey form that [facility] will be sending you.    It was a pleasure meeting with you today.  Thank you for allowing our team the privilege of caring for you today.  YOU are the reason we are here, and we truly hope we provided you with the excellent service you deserve.  Please let us know if there is anything else we can do for you so that we can be sure you are leaving completely satisfied with your care experience.      If you have any billing related questions please call the [facility] Business office at [phone]. The clinic staff does not handle billing related matters.    If you are scheduled to have a follow up appointment, you will receive a reminder call the day before your visit. On the appointment day please arrive 15 minutes prior to your appointment time. If you are unable to keep that appointment, please call the clinic to cancel or reschedule. If you are more than 10 minutes late or greater for your scheduled appointment time, the clinic policy is that you may be asked to reschedule.

## 2025-06-17 ENCOUNTER — TELEPHONE (OUTPATIENT)
Dept: WOUND CARE | Facility: CLINIC | Age: 80
End: 2025-06-17
Payer: COMMERCIAL

## 2025-06-17 NOTE — TELEPHONE ENCOUNTER
"RN looked on [facility] for NPWT orders. Portal shows a pending order for patient for additional canisters placed on 6/11/25. Order shows banner stating \"Excessive Supply. Supplies not shipped. Missing excessive supply form. Call [phone] for more details.\"    RN unable to access hyperlink to print Excessive Supply form. RN called annotated phone number. Gave verbal order of need for supplies.  will fax over prescription for provider signature. Order will be shipped, expected to deliver to client on Friday 6/20/25.    RN called to update patient. No answer. LVM.  "

## 2025-06-17 NOTE — TELEPHONE ENCOUNTER
Patient called the clinic after being told by home care that there is an issue with receiving more canisters (patient is down to two remaining canisters) and was asked to call the [facility] to see if the clinic could provide any assistance. Writer spoke with Daily Peck RN who stated she would follow up with the patient as soon as possible, but in the mean time advised that the patient should call the  number on the back of the patient's wound vac. Patient agreed to call the  number.

## 2025-06-19 ENCOUNTER — MEDICAL CORRESPONDENCE (OUTPATIENT)
Dept: HEALTH INFORMATION MANAGEMENT | Facility: CLINIC | Age: 80
End: 2025-06-19
Payer: COMMERCIAL

## 2025-07-07 ENCOUNTER — HOSPITAL ENCOUNTER (OUTPATIENT)
Dept: WOUND CARE | Facility: CLINIC | Age: 80
Discharge: HOME | End: 2025-07-07
Attending: SURGERY | Admitting: SURGERY
Payer: COMMERCIAL

## 2025-07-07 VITALS — SYSTOLIC BLOOD PRESSURE: 97 MMHG | DIASTOLIC BLOOD PRESSURE: 50 MMHG | TEMPERATURE: 97.7 F | HEART RATE: 66 BPM

## 2025-07-07 DIAGNOSIS — L97.812 SKIN ULCER OF RIGHT KNEE WITH FAT LAYER EXPOSED (H): Primary | ICD-10-CM

## 2025-07-07 PROCEDURE — 11042 DBRDMT SUBQ TIS 1ST 20SQCM/<: CPT | Performed by: SURGERY

## 2025-07-07 NOTE — PROGRESS NOTES
[facility] [place] Progress Note     Subject: Liliane Romo her  returns for follow-up of her right total knee wound ulcer.  VAC change 3 times weekly with home health care.  Drainage is decreasing.    There was involvement of bone at the 4:30 position was very solid.  Saw Dr. Fang @ [facility].  Since.  Placed her on Septra DS and Keflex antibiotics    Patient Active Problem List   Diagnosis    Status post total knee replacement, left    Status post total left knee replacement    Status post total knee replacement, right    Status post right knee replacement    Skin ulcer of right knee with fat layer exposed (H)     Past Medical History:   Diagnosis Date    Arthritis     BPPV (benign paroxysmal positional vertigo)     Complex tear of medial meniscus of right knee     De Quervain's tenosynovitis, left     Diverticulosis     DJD (degenerative joint disease) of knee, bilateral     Esophageal stricture     Dilation x 2    Goiter     History of hormone replacement therapy     HLD (hyperlipidemia)     Hypertension     Hypoparathyroidism     Malignant neoplasm of thyroid gland (H)     Migraine     Obese     Plantar neuroma of right foot     Postablative hypothyroidism     Thyroid disease     Tibial plateau fracture, left     Tremor of right hand      Exam:  BP 97/50 (BP Location: Right arm, Patient Position: Chair, Cuff Size: Adult Regular)   Pulse 66   Temp 97.7  F (36.5  C) (Temporal)   Wound Leg Other (comment) (Active)       Wound (used by OP [facility] only) 05/19/25 1050 knee right;anterior surgical (Active)   Thickness/Stage full thickness 07/07/25 1016   Base slough;pink;exposed structure 07/07/25 1016   Periwound intact;edematous 07/07/25 1016   Periwound Temperature warm 07/07/25 1016   Periwound Skin Turgor soft 07/07/25 1016   Edges open 07/07/25 1016   Length (cm) 3.2 07/07/25 1016   Width (cm) 0.9 07/07/25 1016   Depth (cm) 0.7 07/07/25 1016   Wound (cm^2) 2.88 cm^2 07/07/25 1016   Wound  Volume (cm^3) 2.02 cm^3 07/07/25 1016   Wound healing % -3500 07/07/25 1016   Tunneling [Depth (cm)/Location] .5cm/4oclock 07/07/25 1016   Undermining [Depth (cm)/Location] 1.4/11-3oclock 06/02/25 1051   Drainage Characteristics/Odor serosanguineous 07/07/25 1016   Drainage Amount large 07/07/25 1016   Care, Wound debrided 07/07/25 1016       Incision/Surgical Site 04/03/25 Anterior;Right Knee (Active)       Here with her .  Appropriate.  Comfortable.  Wound VAC removed.  Healthy granulation tissue.  At the 4:30 position we can still probe down to bone which is quite firm.  This is the deepest portion of the open ulcer.  Very mild bioburden at the rest.    Procedure:   Patient was determined to be capable of making their own medical decisions and informed consent was obtained. Topical anesthetic of 4% lidocaine was applied, debridement was performed using a #15 blade down to and including subcutaneous tissue.  All bioburden removed circumferentially including debridement of the skin edges.  Debrided the opening going down to the bone which is now quite clean with good healthy circumferential granulation tissue that had been very sensitive previously.  Debrided area approximately 3 cm .  Bleeding controlled with light pressure. Patient tolerated procedure well.    Impression: Overall improvement.  Still concerned about going down to the bone and on antibiotics per orthopedics.  Wound VAC.  Will use an extra piece down into the depth to the bone to make sure that the sponge touches all edges and a second piece to cover the rest of the wound.    Continue with 3 times weekly VAC dressing changes.      In the near future the their son who lives in [facility] will be coming home and they plan to spend 2 to 3 weeks at the [facility].  Trying to arrange a wound care nurse in [facility] which is close to the cabin or  could be taught to change these dressings.    Plan: We will dress the wounds  with VAC with wick of the VAC sponge into deeper segment.  Patient will return to the clinic in 2 weeks time      Jose Rivera MD on 7/7/2025 at 10:33 AM      Dictated using Dragon voice recognition software which may result in transcription errors         Further instructions from your care team         07/07/2025   Liliane Romo   1945    A DME order was not completed because the patient declined the need for supplies    Dressing changes outside of clinic are being performed by Home Care  [facility] Phone [phone] Fax [phone] (3 times/week)    Plan 07/07/2025   There is a help number in the bag that you can call if you need assistance you can also call home care or us.   Keep wound covered when showering. Keep leg dry with use of a cast protector (available at [facility] or [facility])  Do not soak or submerge your foot/leg in pools, hot tubs, lakes, oceans, tubs, etc.  You do not need to change the dressing on the days you are being seen at the wound clinic  Call Orthopedist for appointment - next available     Wound Dressing Change: Right Knee Anterior Knee   Remove old dressing and ensure all black foam is removed  Cleanse wound with Vashe moist gauze, leave in place for 10 minutes; remove   Cleanse periwound skin with wound spray, pat dry and apply No Sting Barrier film.  Apply drape to frame the wound  Cut Black Foam to fill the depth of wound and cut a small strip to fill the .5cm tunneling at 4 o'clock (fill up over the wound and over the drape as the vac will suck it down)  Cover wound with VAC dressing tape to seal the foam, cut appropriate size opening for the TRAC pad.  Connect TRAC pad and connect to pump; NPWT -125 mmHg Continuous, ensure no leaks  Change canister when alarms full or weekly  Pull up your edemawear from foot to behind the knee. And Spandage size 8 from calf to mid thigh. Cut a small hole to fit the tubing through.   Change dressing two to three times a week  with home care. At your next apt on 8/4 the clinic will change your wound vac.     Document on the outside of the dressing the number of sponges used and the date of the dressing  If dressing is compromised for greater than 2 hours then please remove entire dressing and change or tuck moist Vashe gauze into wound until new dressing can be applied.  May use ostomy paste for divots and crevices as needed to maintain seal.    You do not need to change the dressing on the days you are being seen at the wound clinic      Elevation:  It is recommended that you elevate your legs above the level of your heart for 30 minutes: approximately 2-3 times each day. Be sure to support your knee when you elevate your leg to not cause knee pain.  Ways to do this:   - Lay on the couch or your bed and prop your legs up on pillows   - Recline back as far as you can go in your recliner and prop your legs on pillows.   Doing these things will help reduce the edema in your legs.     Protein:  A diet high in protein is important for wound healing, we recommend getting 90 grams of protein per day (unless directed otherwise to limit your protein intake). Taking protein shakes or bars are a good way to get extra protein in your diet.      Good sources of protein:  Pork 26g per 3 oz  Whey protein powder - 24g per scoop (on average)  Greek yogurt - 23g per 8oz   Chicken or Turkey - 23g per 3oz  Fish - 20-25g per 3oz  Beef - 18-23g per 3oz  Tofu - 10g per 1/2 cup  Navy beans - 20g per cup  Cottage cheese - 14g per 1/2 cup   Lentils - 13g per 1/4 cup  Beef jerky 13g per 1oz  2% milk - 8g per cup  Peanut butter - 8g per 2 tablespoons  Eggs - 6g per egg  Mixed nuts - 6g per 2oz       Main Provider: Jose Rivera M.D. July 7, 2025    Call us at [phone] if you have any questions about your wounds, if you have redness or swelling around your wound, have a fever of 101 degrees Fahrenheit or greater or if you have any other problems or concerns.  We answer the phone Monday through Friday 8 am to 4 pm, please leave a message as we check the voicemail frequently throughout the day. If you have a concern over the weekend, please leave a message and we will return your call Monday. If the need is urgent, go to the ER or urgent care.    If you had a positive experience please indicate that on your patient satisfaction survey form that [facility] will be sending you.    It was a pleasure meeting with you today.  Thank you for allowing our team the privilege of caring for you today.  YOU are the reason we are here, and we truly hope we provided you with the excellent service you deserve.  Please let us know if there is anything else we can do for you so that we can be sure you are leaving completely satisfied with your care experience.      If you have any billing related questions please call the [facility] Business office at [phone]. The clinic staff does not handle billing related matters.    If you are scheduled to have a follow up appointment, you will receive a reminder call the day before your visit. On the appointment day please arrive 15 minutes prior to your appointment time. If you are unable to keep that appointment, please call the clinic to cancel or reschedule. If you are more than 10 minutes late or greater for your scheduled appointment time, the clinic policy is that you may be asked to reschedule.

## 2025-07-07 NOTE — DISCHARGE INSTRUCTIONS
07/07/2025   Liliane Romo   1945    A DME order was not completed because the patient declined the need for supplies    Dressing changes outside of clinic are being performed by Home Care  [facility] Phone [phone] Fax [phone] (3 times/week)    Plan 07/07/2025   There is a help number in the bag that you can call if you need assistance you can also call home care or us.   Keep wound covered when showering. Keep leg dry with use of a cast protector (available at [facility] or [employer])  Do not soak or submerge your foot/leg in pools, hot tubs, lakes, oceans, tubs, etc.  You do not need to change the dressing on the days you are being seen at the wound clinic  Call Orthopedist for appointment - next available     Wound Dressing Change: Right Knee Anterior Knee   Remove old dressing and ensure all black foam is removed  Cleanse wound with Vashe moist gauze, leave in place for 10 minutes; remove   Cleanse periwound skin with wound spray, pat dry and apply No Sting Barrier film.  Apply drape to frame the wound  Cut Black Foam to fill the depth of wound and cut a small strip to fill the .5cm tunneling at 4 o'clock (fill up over the wound and over the drape as the vac will suck it down)  Cover wound with VAC dressing tape to seal the foam, cut appropriate size opening for the TRAC pad.  Connect TRAC pad and connect to pump; NPWT -125 mmHg Continuous, ensure no leaks  Change canister when alarms full or weekly  Pull up your edemawear from foot to behind the knee. And Spandage size 8 from calf to mid thigh. Cut a small hole to fit the tubing through.   Change dressing two to three times a week with home care. At your next apt on 8/4 the clinic will change your wound vac.     Document on the outside of the dressing the number of sponges used and the date of the dressing  If dressing is compromised for greater than 2 hours then please remove entire dressing and change or tuck moist Vashe gauze into wound  until new dressing can be applied.  May use ostomy paste for divots and crevices as needed to maintain seal.    You do not need to change the dressing on the days you are being seen at the wound clinic      Elevation:  It is recommended that you elevate your legs above the level of your heart for 30 minutes: approximately 2-3 times each day. Be sure to support your knee when you elevate your leg to not cause knee pain.  Ways to do this:   - Lay on the couch or your bed and prop your legs up on pillows   - Recline back as far as you can go in your recliner and prop your legs on pillows.   Doing these things will help reduce the edema in your legs.     Protein:  A diet high in protein is important for wound healing, we recommend getting 90 grams of protein per day (unless directed otherwise to limit your protein intake). Taking protein shakes or bars are a good way to get extra protein in your diet.      Good sources of protein:  Pork 26g per 3 oz  Whey protein powder - 24g per scoop (on average)  Greek yogurt - 23g per 8oz   Chicken or Turkey - 23g per 3oz  Fish - 20-25g per 3oz  Beef - 18-23g per 3oz  Tofu - 10g per 1/2 cup  Navy beans - 20g per cup  Cottage cheese - 14g per 1/2 cup   Lentils - 13g per 1/4 cup  Beef jerky 13g per 1oz  2% milk - 8g per cup  Peanut butter - 8g per 2 tablespoons  Eggs - 6g per egg  Mixed nuts - 6g per 2oz       Main Provider: Jose Rivera M.D. July 7, 2025    Call us at [phone] if you have any questions about your wounds, if you have redness or swelling around your wound, have a fever of 101 degrees Fahrenheit or greater or if you have any other problems or concerns. We answer the phone Monday through Friday 8 am to 4 pm, please leave a message as we check the voicemail frequently throughout the day. If you have a concern over the weekend, please leave a message and we will return your call Monday. If the need is urgent, go to the ER or urgent care.    If you had a positive  experience please indicate that on your patient satisfaction survey form that [facility] will be sending you.    It was a pleasure meeting with you today.  Thank you for allowing our team the privilege of caring for you today.  YOU are the reason we are here, and we truly hope we provided you with the excellent service you deserve.  Please let us know if there is anything else we can do for you so that we can be sure you are leaving completely satisfied with your care experience.      If you have any billing related questions please call the [facility] Business office at [phone]. The clinic staff does not handle billing related matters.    If you are scheduled to have a follow up appointment, you will receive a reminder call the day before your visit. On the appointment day please arrive 15 minutes prior to your appointment time. If you are unable to keep that appointment, please call the clinic to cancel or reschedule. If you are more than 10 minutes late or greater for your scheduled appointment time, the clinic policy is that you may be asked to reschedule.

## 2025-07-21 ENCOUNTER — TELEPHONE (OUTPATIENT)
Dept: WOUND CARE | Facility: CLINIC | Age: 80
End: 2025-07-21
Payer: COMMERCIAL

## 2025-07-21 NOTE — TELEPHONE ENCOUNTER
WOC to evaluate and treat for VAC changes while the patient is at her cabin for 2 weeks. [facility] will resume care when the patient is back at home in [place].

## 2025-07-21 NOTE — TELEPHONE ENCOUNTER
Call placed to Sushma to ensure she received the fax and to see if anything more is needed. She requested signed orders for eval and treat for VAC changes. Signed order faxed to [phone]. Sushma confirmed she had received the previous fax.

## 2025-07-21 NOTE — TELEPHONE ENCOUNTER
Call received from [facility] . She reports the [place] Saturday 7/26/25 for 2 weeks so [facility] is needing orders to hold home care during this time. Ok given for this. She also reports the nurse who will be doing the dressing changes needs orders faxed to her. Sushma Haynes Phone [phone] Fax [phone]. Progress note with care instructions faxed to fax number.

## 2025-08-04 ENCOUNTER — TELEPHONE (OUTPATIENT)
Dept: WOUND CARE | Facility: CLINIC | Age: 80
End: 2025-08-04

## 2025-08-04 ENCOUNTER — OFFICE VISIT (OUTPATIENT)
Dept: WOUND CARE | Facility: CLINIC | Age: 80
End: 2025-08-04
Attending: SURGERY
Payer: COMMERCIAL

## 2025-08-04 VITALS — HEART RATE: 74 BPM | TEMPERATURE: 98.7 F | SYSTOLIC BLOOD PRESSURE: 107 MMHG | DIASTOLIC BLOOD PRESSURE: 59 MMHG

## 2025-08-04 DIAGNOSIS — L97.812 SKIN ULCER OF RIGHT KNEE WITH FAT LAYER EXPOSED (H): Primary | ICD-10-CM

## 2025-08-04 PROCEDURE — 97597 DBRDMT OPN WND 1ST 20 CM/<: CPT | Performed by: SURGERY

## 2025-08-04 PROCEDURE — G0463 HOSPITAL OUTPT CLINIC VISIT: HCPCS | Performed by: SURGERY

## 2025-08-04 RX ORDER — METRONIDAZOLE 500 MG/1
500 TABLET ORAL DAILY
Qty: 90 TABLET | Refills: 0 | Status: SHIPPED | OUTPATIENT
Start: 2025-08-04

## 2025-08-04 ASSESSMENT — PAIN SCALES - GENERAL: PAINLEVEL_OUTOF10: NO PAIN (0)

## 2025-08-06 ENCOUNTER — TELEPHONE (OUTPATIENT)
Dept: WOUND CARE | Facility: CLINIC | Age: 80
End: 2025-08-06
Payer: COMMERCIAL

## 2025-08-15 ENCOUNTER — MEDICAL CORRESPONDENCE (OUTPATIENT)
Dept: HEALTH INFORMATION MANAGEMENT | Facility: CLINIC | Age: 80
End: 2025-08-15

## 2025-08-15 DIAGNOSIS — Z53.9 DIAGNOSIS NOT YET DEFINED: Primary | ICD-10-CM

## 2025-08-22 ENCOUNTER — MEDICAL CORRESPONDENCE (OUTPATIENT)
Dept: HEALTH INFORMATION MANAGEMENT | Facility: CLINIC | Age: 80
End: 2025-08-22

## (undated) DEVICE — SU MONOCRYL 3-0 PS-2 27" Y427H

## (undated) DEVICE — DECANTER BAG 2002S

## (undated) DEVICE — SOL NACL 0.9% IRRIG 1000ML BOTTLE 2F7124

## (undated) DEVICE — WRAP EZY KNEE 1213PP

## (undated) DEVICE — DRAPE STERI U 1015

## (undated) DEVICE — SOL NACL 0.9% INJ 1000ML BAG 2B1324X

## (undated) DEVICE — HOOD SURG T7PLUS PEEL AWAY FACE SHIELD STRL LF 0416-801-100

## (undated) DEVICE — DRAPE IOBAN INCISE 23X17" 6650EZ

## (undated) DEVICE — SYR 50ML LL W/O NDL 309653

## (undated) DEVICE — SUCTION IRR SYSTEM W/O TIP INTERPULSE HANDPIECE 0210-100-000

## (undated) DEVICE — ESU GROUND PAD UNIVERSAL W/O CORD

## (undated) DEVICE — SOLUTION WOUND CLEANSING 3/4OZ 10% PVP EA-L3011FB-50

## (undated) DEVICE — SU VICRYL+ 0 CTX 18IN VIO VCP764D

## (undated) DEVICE — SU VICRYL+ 2-0 27IN CP-1 UND VCP266H

## (undated) DEVICE — BONE CEMENT MIXEVAC III HI VAC KIT  0206-015-000

## (undated) DEVICE — GLOVE GAMMEX DERMAPRENE ULTRA SZ 8.5 LF 8517

## (undated) DEVICE — BONE CLEANING TIP INTERPULSE  0210-010-000

## (undated) DEVICE — DRAPE SHEET REV FOLD 3/4 9349

## (undated) DEVICE — CLOSURE SYS SKIN PREMIERPRO EXOFIN FUSION 4X22CM STRL 3472

## (undated) DEVICE — SU ETHIBOND 0 CTX CR  8X18" CX31D

## (undated) DEVICE — PACK TOTAL KNEE SOP15TKFSD

## (undated) DEVICE — BLADE SAW SAGITTAL STRK 18X90X1.27MM HD SYS 6 6118-127-090

## (undated) DEVICE — LINEN TOWEL PACK X5 5464

## (undated) DEVICE — Device

## (undated) DEVICE — SU STRATAFIX PDS PLUS 2-0 SPIRAL CT-1 30CM SXPP1B410

## (undated) DEVICE — SOL NACL 0.9% INJ 250ML BAG 2B1322Q

## (undated) DEVICE — SOL WATER IRRIG 1000ML BOTTLE 2F7114

## (undated) DEVICE — CAST PADDING 6" UNSTERILE 9046

## (undated) DEVICE — ESU BIPOLAR SEALER AQUAMANTYS 6MM 23-112-1

## (undated) DEVICE — MANIFOLD NEPTUNE 4 PORT 700-20

## (undated) DEVICE — SU STRATAFIX PDS PLUS 1 CT-1 18" SXPP1A404

## (undated) DEVICE — SUCTION MANIFOLD NEPTUNE 2 SYS 4 PORT 0702-020-000

## (undated) DEVICE — GLOVE BIOGEL PI SZ 8.5 40885

## (undated) DEVICE — SU VICRYL 0 CTX CR 8X18" J764D

## (undated) DEVICE — SU VICRYL 2-0 CP-1 27" UND J266H

## (undated) DEVICE — NDL 19GA 1.5"

## (undated) DEVICE — GLOVE BIOGEL PI MICRO INDICATOR UNDERGLOVE SZ 8.5 48985

## (undated) DEVICE — DRSG ABDOMINAL 07 1/2X8" 7197D

## (undated) RX ORDER — PREGABALIN 150 MG/1
CAPSULE ORAL
Status: DISPENSED
Start: 2025-04-03

## (undated) RX ORDER — FENTANYL CITRATE 50 UG/ML
INJECTION, SOLUTION INTRAMUSCULAR; INTRAVENOUS
Status: DISPENSED
Start: 2025-04-03

## (undated) RX ORDER — PROPOFOL 10 MG/ML
INJECTION, EMULSION INTRAVENOUS
Status: DISPENSED
Start: 2023-07-21

## (undated) RX ORDER — PROPOFOL 10 MG/ML
INJECTION, EMULSION INTRAVENOUS
Status: DISPENSED
Start: 2025-04-03

## (undated) RX ORDER — CEFAZOLIN SODIUM/WATER 2 G/20 ML
SYRINGE (ML) INTRAVENOUS
Status: DISPENSED
Start: 2023-07-21

## (undated) RX ORDER — ONDANSETRON 2 MG/ML
INJECTION INTRAMUSCULAR; INTRAVENOUS
Status: DISPENSED
Start: 2025-04-03

## (undated) RX ORDER — VANCOMYCIN HYDROCHLORIDE 1 G/20ML
INJECTION, POWDER, LYOPHILIZED, FOR SOLUTION INTRAVENOUS
Status: DISPENSED
Start: 2023-07-21

## (undated) RX ORDER — EPHEDRINE SULFATE 50 MG/ML
INJECTION, SOLUTION INTRAMUSCULAR; INTRAVENOUS; SUBCUTANEOUS
Status: DISPENSED
Start: 2025-04-03

## (undated) RX ORDER — PREGABALIN 150 MG/1
CAPSULE ORAL
Status: DISPENSED
Start: 2023-07-21

## (undated) RX ORDER — DEXAMETHASONE SODIUM PHOSPHATE 4 MG/ML
INJECTION, SOLUTION INTRA-ARTICULAR; INTRALESIONAL; INTRAMUSCULAR; INTRAVENOUS; SOFT TISSUE
Status: DISPENSED
Start: 2025-04-03

## (undated) RX ORDER — VANCOMYCIN HYDROCHLORIDE 1 G/20ML
INJECTION, POWDER, LYOPHILIZED, FOR SOLUTION INTRAVENOUS
Status: DISPENSED
Start: 2025-04-03

## (undated) RX ORDER — TRANEXAMIC ACID 650 MG/1
TABLET ORAL
Status: DISPENSED
Start: 2023-07-21

## (undated) RX ORDER — ACETAMINOPHEN 325 MG/1
TABLET ORAL
Status: DISPENSED
Start: 2023-07-21

## (undated) RX ORDER — TRANEXAMIC ACID 650 MG/1
TABLET ORAL
Status: DISPENSED
Start: 2025-04-03